# Patient Record
Sex: MALE | Race: BLACK OR AFRICAN AMERICAN | NOT HISPANIC OR LATINO | Employment: OTHER | URBAN - METROPOLITAN AREA
[De-identification: names, ages, dates, MRNs, and addresses within clinical notes are randomized per-mention and may not be internally consistent; named-entity substitution may affect disease eponyms.]

---

## 2023-01-25 PROBLEM — T45.1X5A CHEMOTHERAPY INDUCED NAUSEA AND VOMITING: Status: ACTIVE | Noted: 2023-01-25

## 2023-01-25 PROBLEM — F11.90 CHRONIC NARCOTIC USE: Status: RESOLVED | Noted: 2023-01-25 | Resolved: 2023-01-25

## 2023-01-25 PROBLEM — S96.911A RIGHT ANKLE STRAIN, INITIAL ENCOUNTER: Status: ACTIVE | Noted: 2023-01-25

## 2023-01-25 PROBLEM — M65.30 ACQUIRED TRIGGER FINGER: Status: ACTIVE | Noted: 2023-01-25

## 2023-01-25 PROBLEM — J30.1 ALLERGIC RHINITIS DUE TO POLLEN: Status: ACTIVE | Noted: 2023-01-25

## 2023-01-25 PROBLEM — K59.09 CHRONIC CONSTIPATION: Status: ACTIVE | Noted: 2023-01-25

## 2023-01-25 PROBLEM — M93.20 OSTEOCHONDRITIS DISSECANS: Status: ACTIVE | Noted: 2023-01-25

## 2023-01-25 PROBLEM — R35.0 URINARY FREQUENCY: Status: ACTIVE | Noted: 2023-01-25

## 2023-01-25 PROBLEM — M25.811 SHOULDER IMPINGEMENT, RIGHT: Status: ACTIVE | Noted: 2023-01-25

## 2023-01-25 PROBLEM — S93.409A SPRAIN OF ANKLE: Status: RESOLVED | Noted: 2023-01-25 | Resolved: 2023-01-25

## 2023-01-25 PROBLEM — S93.409A SPRAIN OF ANKLE: Status: ACTIVE | Noted: 2023-01-25

## 2023-01-25 PROBLEM — R94.31 ABNORMAL EKG: Status: ACTIVE | Noted: 2023-01-25

## 2023-01-25 PROBLEM — R10.9 ABDOMINAL PAIN: Status: ACTIVE | Noted: 2023-01-25

## 2023-01-25 PROBLEM — R53.83 FATIGUE: Status: ACTIVE | Noted: 2023-01-25

## 2023-01-25 PROBLEM — J45.40 MODERATE PERSISTENT ASTHMA (HHS-HCC): Status: ACTIVE | Noted: 2023-01-25

## 2023-01-25 PROBLEM — G47.33 OBSTRUCTIVE SLEEP APNEA: Status: ACTIVE | Noted: 2023-01-25

## 2023-01-25 PROBLEM — R44.8 FACIAL PRESSURE: Status: ACTIVE | Noted: 2023-01-25

## 2023-01-25 PROBLEM — M60.9 MYOSITIS: Status: ACTIVE | Noted: 2023-01-25

## 2023-01-25 PROBLEM — M25.571 ACUTE RIGHT ANKLE PAIN: Status: ACTIVE | Noted: 2023-01-25

## 2023-01-25 PROBLEM — M25.549 PAIN IN JOINT, HAND: Status: ACTIVE | Noted: 2023-01-25

## 2023-01-25 PROBLEM — R43.8 DECREASED SENSE OF SMELL: Status: ACTIVE | Noted: 2023-01-25

## 2023-01-25 PROBLEM — S93.401A SPRAIN OF ANKLE, RIGHT: Status: ACTIVE | Noted: 2023-01-25

## 2023-01-25 PROBLEM — J45.909 REACTIVE AIRWAY DISEASE (HHS-HCC): Status: ACTIVE | Noted: 2023-01-25

## 2023-01-25 PROBLEM — R10.2 PELVIC PAIN IN MALE: Status: ACTIVE | Noted: 2023-01-25

## 2023-01-25 PROBLEM — M25.511 RIGHT SHOULDER PAIN: Status: ACTIVE | Noted: 2023-01-25

## 2023-01-25 PROBLEM — I49.3 PVC'S (PREMATURE VENTRICULAR CONTRACTIONS): Status: ACTIVE | Noted: 2023-01-25

## 2023-01-25 PROBLEM — E55.9 VITAMIN D INSUFFICIENCY: Status: ACTIVE | Noted: 2023-01-25

## 2023-01-25 PROBLEM — R14.0 ABDOMINAL BLOATING: Status: ACTIVE | Noted: 2023-01-25

## 2023-01-25 PROBLEM — M35.01 SJOGREN'S SYNDROME WITH KERATOCONJUNCTIVITIS SICCA (MULTI): Status: ACTIVE | Noted: 2023-01-25

## 2023-01-25 PROBLEM — R11.2 CHEMOTHERAPY INDUCED NAUSEA AND VOMITING: Status: ACTIVE | Noted: 2023-01-25

## 2023-01-25 PROBLEM — M93.269 OSTEOCHONDRITIS DISSECANS OF KNEE: Status: ACTIVE | Noted: 2023-01-25

## 2023-01-25 PROBLEM — B37.0 ORAL THRUSH: Status: ACTIVE | Noted: 2023-01-25

## 2023-01-25 PROBLEM — E78.9 BORDERLINE HIGH CHOLESTEROL: Status: ACTIVE | Noted: 2023-01-25

## 2023-01-25 PROBLEM — R20.9 DISTURBANCE OF SKIN SENSATION: Status: ACTIVE | Noted: 2023-01-25

## 2023-01-25 PROBLEM — G62.9 NEUROPATHY, PERIPHERAL: Status: ACTIVE | Noted: 2023-01-25

## 2023-01-25 PROBLEM — M75.50 BURSITIS, SUBACROMIAL: Status: ACTIVE | Noted: 2023-01-25

## 2023-01-25 PROBLEM — J34.3 HYPERTROPHY OF BOTH INFERIOR NASAL TURBINATES: Status: ACTIVE | Noted: 2023-01-25

## 2023-01-25 PROBLEM — N41.9 PROSTATITIS: Status: ACTIVE | Noted: 2023-01-25

## 2023-01-25 PROBLEM — G62.89 SMALL FIBER POLYNEUROPATHY: Status: ACTIVE | Noted: 2023-01-25

## 2023-01-25 PROBLEM — R51.9 HEADACHE: Status: ACTIVE | Noted: 2023-01-25

## 2023-01-25 PROBLEM — G47.30 BREATHING-RELATED SLEEP DISORDER: Status: ACTIVE | Noted: 2023-01-25

## 2023-01-25 PROBLEM — M25.371 INSTABILITY OF RIGHT ANKLE JOINT: Status: ACTIVE | Noted: 2023-01-25

## 2023-01-25 PROBLEM — R09.81 NASAL CONGESTION: Status: ACTIVE | Noted: 2023-01-25

## 2023-01-25 PROBLEM — M25.511 PAIN IN JOINT OF RIGHT SHOULDER: Status: ACTIVE | Noted: 2023-01-25

## 2023-01-25 PROBLEM — S46.911A RIGHT SHOULDER STRAIN, INITIAL ENCOUNTER: Status: ACTIVE | Noted: 2023-01-25

## 2023-01-25 PROBLEM — M79.10 MYALGIA: Status: ACTIVE | Noted: 2023-01-25

## 2023-01-25 PROBLEM — H10.45 CHRONIC ALLERGIC CONJUNCTIVITIS: Status: ACTIVE | Noted: 2023-01-25

## 2023-01-25 PROBLEM — J02.9 PHARYNGITIS: Status: ACTIVE | Noted: 2023-01-25

## 2023-01-25 PROBLEM — G47.30 SLEEP APNEA: Status: ACTIVE | Noted: 2023-01-25

## 2023-01-25 PROBLEM — M53.3 DISORDER OF SACRUM: Status: ACTIVE | Noted: 2023-01-25

## 2023-01-25 PROBLEM — G47.8 POOR SLEEP PATTERN: Status: ACTIVE | Noted: 2023-01-25

## 2023-01-25 PROBLEM — R52 GENERALIZED PAIN: Status: ACTIVE | Noted: 2023-01-25

## 2023-01-25 PROBLEM — N52.9 MALE ERECTILE DISORDER: Status: ACTIVE | Noted: 2023-01-25

## 2023-01-25 PROBLEM — I27.20 PULMONARY HYPERTENSION (MULTI): Status: ACTIVE | Noted: 2023-01-25

## 2023-01-25 PROBLEM — R09.82 POSTNASAL DRIP: Status: ACTIVE | Noted: 2023-01-25

## 2023-01-25 PROBLEM — H15.109 EPISCLERITIS: Status: ACTIVE | Noted: 2023-01-25

## 2023-01-25 PROBLEM — M75.41 IMPINGEMENT SYNDROME OF RIGHT SHOULDER: Status: ACTIVE | Noted: 2023-01-25

## 2023-01-25 PROBLEM — G25.81 RESTLESS LEG SYNDROME: Status: ACTIVE | Noted: 2023-01-25

## 2023-01-25 PROBLEM — T50.Z95A ADVERSE EFFECT OF OTHER VACCINES AND BIOLOGICAL SUBSTANCES, INITIAL ENCOUNTER: Status: ACTIVE | Noted: 2023-01-25

## 2023-01-25 PROBLEM — M93.20 OSTEOCHONDRITIS DISSECANS: Status: RESOLVED | Noted: 2023-01-25 | Resolved: 2023-01-25

## 2023-01-25 PROBLEM — M35.00 SJOGRENS SYNDROME (MULTI): Status: ACTIVE | Noted: 2023-01-25

## 2023-01-25 PROBLEM — F11.90 CHRONIC NARCOTIC USE: Status: ACTIVE | Noted: 2023-01-25

## 2023-01-25 PROBLEM — M94.20: Status: ACTIVE | Noted: 2023-01-25

## 2023-01-25 PROBLEM — G56.01 CARPAL TUNNEL SYNDROME OF RIGHT WRIST: Status: ACTIVE | Noted: 2023-01-25

## 2023-01-25 PROBLEM — S93.401D SPRAIN OF UNSPECIFIED LIGAMENT OF RIGHT ANKLE, SUBSEQUENT ENCOUNTER: Status: ACTIVE | Noted: 2023-01-25

## 2023-01-25 RX ORDER — TIZANIDINE 4 MG/1
1 TABLET ORAL 3 TIMES DAILY PRN
COMMUNITY
End: 2024-02-29 | Stop reason: ALTCHOICE

## 2023-01-25 RX ORDER — MONTELUKAST SODIUM 10 MG/1
1 TABLET ORAL
COMMUNITY
End: 2023-09-06 | Stop reason: SDUPTHER

## 2023-01-25 RX ORDER — ALBUTEROL SULFATE 0.83 MG/ML
2.5 SOLUTION RESPIRATORY (INHALATION) EVERY 6 HOURS PRN
COMMUNITY

## 2023-01-25 RX ORDER — CETIRIZINE HYDROCHLORIDE 10 MG/1
TABLET ORAL
COMMUNITY

## 2023-01-25 RX ORDER — GABAPENTIN 600 MG/1
3 TABLET ORAL 3 TIMES DAILY
COMMUNITY
End: 2023-10-27 | Stop reason: SDUPTHER

## 2023-01-25 RX ORDER — METHYLPREDNISOLONE 4 MG/1
TABLET ORAL
COMMUNITY
Start: 2020-07-09

## 2023-01-25 RX ORDER — MULTIVITAMIN
1 TABLET ORAL DAILY
COMMUNITY

## 2023-01-25 RX ORDER — TRETINOIN 0.25 MG/G
CREAM TOPICAL
COMMUNITY
End: 2024-05-29 | Stop reason: ALTCHOICE

## 2023-01-25 RX ORDER — PRUCALOPRIDE 2 MG/1
1 TABLET, FILM COATED ORAL DAILY
COMMUNITY

## 2023-01-25 RX ORDER — GABAPENTIN 100 MG/1
1 CAPSULE ORAL 3 TIMES DAILY
COMMUNITY

## 2023-01-25 RX ORDER — MELOXICAM 15 MG/1
1 TABLET ORAL DAILY PRN
COMMUNITY
End: 2023-10-23

## 2023-01-25 RX ORDER — OMEPRAZOLE 40 MG/1
1 CAPSULE, DELAYED RELEASE ORAL DAILY
COMMUNITY
End: 2023-03-09 | Stop reason: SDUPTHER

## 2023-01-25 RX ORDER — OXYCODONE HYDROCHLORIDE 10 MG/1
1 TABLET ORAL
COMMUNITY
Start: 2012-10-25 | End: 2023-10-23 | Stop reason: SDUPTHER

## 2023-01-25 RX ORDER — FLUTICASONE PROPIONATE 50 MCG
SPRAY, SUSPENSION (ML) NASAL
COMMUNITY

## 2023-01-25 RX ORDER — ALPRAZOLAM 1 MG/1
1 TABLET ORAL 3 TIMES DAILY PRN
COMMUNITY
End: 2024-02-29 | Stop reason: DRUGHIGH

## 2023-01-25 RX ORDER — AZELASTINE 1 MG/ML
SPRAY, METERED NASAL
COMMUNITY

## 2023-01-25 RX ORDER — NALOXONE HYDROCHLORIDE 4 MG/.1ML
4 SPRAY NASAL
COMMUNITY

## 2023-01-25 RX ORDER — FLAXSEED OIL 1000 MG
CAPSULE ORAL
COMMUNITY
End: 2024-05-29 | Stop reason: ALTCHOICE

## 2023-01-25 RX ORDER — ALBUTEROL SULFATE 90 UG/1
2 AEROSOL, METERED RESPIRATORY (INHALATION) EVERY 4 HOURS PRN
COMMUNITY
End: 2024-02-29 | Stop reason: SDUPTHER

## 2023-01-25 RX ORDER — EPINEPHRINE 0.3 MG/.3ML
0.3 INJECTION SUBCUTANEOUS
COMMUNITY
Start: 2019-11-21

## 2023-01-25 RX ORDER — FLUTICASONE PROPIONATE 220 UG/1
2 AEROSOL, METERED RESPIRATORY (INHALATION) 2 TIMES DAILY
COMMUNITY
Start: 2020-02-17

## 2023-01-25 RX ORDER — MIRTAZAPINE 30 MG/1
2 TABLET, FILM COATED ORAL NIGHTLY
COMMUNITY

## 2023-02-11 PROBLEM — M35.00 SJOGREN'S SYNDROME (MULTI): Status: ACTIVE | Noted: 2023-02-11

## 2023-02-11 PROBLEM — F32.2 SEVERE MAJOR DEPRESSION WITHOUT PSYCHOTIC FEATURES (MULTI): Status: ACTIVE | Noted: 2023-02-11

## 2023-02-11 PROBLEM — R73.03 PREDIABETES: Status: ACTIVE | Noted: 2022-02-14

## 2023-02-11 RX ORDER — FLUTICASONE PROPIONATE 110 UG/1
2 AEROSOL, METERED RESPIRATORY (INHALATION) 2 TIMES DAILY
COMMUNITY
End: 2024-02-29 | Stop reason: DRUGHIGH

## 2023-02-11 RX ORDER — LUBIPROSTONE 24 UG/1
1 CAPSULE ORAL 2 TIMES DAILY
COMMUNITY
Start: 2022-10-25 | End: 2023-03-09 | Stop reason: SDUPTHER

## 2023-02-11 RX ORDER — POLYETHYLENE GLYCOL 3350 17 G/17G
17 POWDER, FOR SOLUTION ORAL DAILY
COMMUNITY
Start: 2022-10-25

## 2023-02-11 RX ORDER — ACETAMINOPHEN, DIPHENHYDRAMINE HCL, PHENYLEPHRINE HCL 325; 25; 5 MG/1; MG/1; MG/1
TABLET ORAL
COMMUNITY
End: 2024-05-29 | Stop reason: ALTCHOICE

## 2023-02-11 RX ORDER — PROPRANOLOL HYDROCHLORIDE 10 MG/1
1 TABLET ORAL 2 TIMES DAILY PRN
COMMUNITY

## 2023-02-11 RX ORDER — HYDROXYZINE HYDROCHLORIDE 25 MG/1
2 TABLET, FILM COATED ORAL NIGHTLY
COMMUNITY

## 2023-02-11 RX ORDER — TRAZODONE HYDROCHLORIDE 50 MG/1
1 TABLET ORAL NIGHTLY
COMMUNITY
End: 2024-05-29 | Stop reason: ALTCHOICE

## 2023-02-11 RX ORDER — LISINOPRIL 20 MG/1
1 TABLET ORAL DAILY
COMMUNITY
End: 2023-03-09 | Stop reason: SDUPTHER

## 2023-02-11 RX ORDER — CLINDAMYCIN PHOSPHATE 10 MG/G
GEL TOPICAL DAILY PRN
COMMUNITY
Start: 2021-05-25 | End: 2024-05-29 | Stop reason: ALTCHOICE

## 2023-02-11 RX ORDER — ALPRAZOLAM 2 MG/1
1 TABLET ORAL 4 TIMES DAILY PRN
COMMUNITY

## 2023-02-11 RX ORDER — TRIAMCINOLONE ACETONIDE 1 MG/ML
LOTION TOPICAL 2 TIMES DAILY
COMMUNITY
Start: 2021-08-19 | End: 2024-05-29 | Stop reason: ALTCHOICE

## 2023-03-08 LAB
ALANINE AMINOTRANSFERASE (SGPT) (U/L) IN SER/PLAS: 15 U/L (ref 10–52)
ALBUMIN (G/DL) IN SER/PLAS: 4.7 G/DL (ref 3.4–5)
ALKALINE PHOSPHATASE (U/L) IN SER/PLAS: 63 U/L (ref 33–120)
ANION GAP IN SER/PLAS: 8 MMOL/L (ref 10–20)
ASPARTATE AMINOTRANSFERASE (SGOT) (U/L) IN SER/PLAS: 19 U/L (ref 9–39)
BASOPHILS (10*3/UL) IN BLOOD BY AUTOMATED COUNT: 0.05 X10E9/L (ref 0–0.1)
BASOPHILS/100 LEUKOCYTES IN BLOOD BY AUTOMATED COUNT: 0.8 % (ref 0–2)
BILIRUBIN TOTAL (MG/DL) IN SER/PLAS: 0.6 MG/DL (ref 0–1.2)
C REACTIVE PROTEIN (MG/L) IN SER/PLAS: <0.1 MG/DL
CALCIUM (MG/DL) IN SER/PLAS: 9.6 MG/DL (ref 8.6–10.3)
CARBON DIOXIDE, TOTAL (MMOL/L) IN SER/PLAS: 32 MMOL/L (ref 21–32)
CHLORIDE (MMOL/L) IN SER/PLAS: 101 MMOL/L (ref 98–107)
CHOLESTEROL (MG/DL) IN SER/PLAS: 246 MG/DL (ref 0–199)
CHOLESTEROL IN HDL (MG/DL) IN SER/PLAS: 35.3 MG/DL
CHOLESTEROL/HDL RATIO: 7
CREATINE KINASE (U/L) IN SER/PLAS: 238 U/L (ref 0–325)
CREATININE (MG/DL) IN SER/PLAS: 1.27 MG/DL (ref 0.5–1.3)
EOSINOPHILS (10*3/UL) IN BLOOD BY AUTOMATED COUNT: 0.19 X10E9/L (ref 0–0.7)
EOSINOPHILS/100 LEUKOCYTES IN BLOOD BY AUTOMATED COUNT: 3.1 % (ref 0–6)
ERYTHROCYTE DISTRIBUTION WIDTH (RATIO) BY AUTOMATED COUNT: 12.1 % (ref 11.5–14.5)
ERYTHROCYTE MEAN CORPUSCULAR HEMOGLOBIN CONCENTRATION (G/DL) BY AUTOMATED: 34.8 G/DL (ref 32–36)
ERYTHROCYTE MEAN CORPUSCULAR VOLUME (FL) BY AUTOMATED COUNT: 88 FL (ref 80–100)
ERYTHROCYTES (10*6/UL) IN BLOOD BY AUTOMATED COUNT: 5.14 X10E12/L (ref 4.5–5.9)
ESTIMATED AVERAGE GLUCOSE FOR HBA1C: 114 MG/DL
GFR MALE: 73 ML/MIN/1.73M2
GLUCOSE (MG/DL) IN SER/PLAS: 89 MG/DL (ref 74–99)
HEMATOCRIT (%) IN BLOOD BY AUTOMATED COUNT: 45.4 % (ref 41–52)
HEMOGLOBIN (G/DL) IN BLOOD: 15.8 G/DL (ref 13.5–17.5)
HEMOGLOBIN A1C/HEMOGLOBIN TOTAL IN BLOOD: 5.6 %
IMMATURE GRANULOCYTES/100 LEUKOCYTES IN BLOOD BY AUTOMATED COUNT: 0 % (ref 0–0.9)
LDL: 171 MG/DL (ref 0–99)
LEUKOCYTES (10*3/UL) IN BLOOD BY AUTOMATED COUNT: 6 X10E9/L (ref 4.4–11.3)
LYMPHOCYTES (10*3/UL) IN BLOOD BY AUTOMATED COUNT: 2.51 X10E9/L (ref 1.2–4.8)
LYMPHOCYTES/100 LEUKOCYTES IN BLOOD BY AUTOMATED COUNT: 41.6 % (ref 13–44)
MONOCYTES (10*3/UL) IN BLOOD BY AUTOMATED COUNT: 0.4 X10E9/L (ref 0.1–1)
MONOCYTES/100 LEUKOCYTES IN BLOOD BY AUTOMATED COUNT: 6.6 % (ref 2–10)
NEUTROPHILS (10*3/UL) IN BLOOD BY AUTOMATED COUNT: 2.89 X10E9/L (ref 1.2–7.7)
NEUTROPHILS/100 LEUKOCYTES IN BLOOD BY AUTOMATED COUNT: 47.9 % (ref 40–80)
NON HDL CHOLESTEROL: 211 MG/DL
PLATELETS (10*3/UL) IN BLOOD AUTOMATED COUNT: 324 X10E9/L (ref 150–450)
POTASSIUM (MMOL/L) IN SER/PLAS: 5.3 MMOL/L (ref 3.5–5.3)
PROTEIN TOTAL: 7.9 G/DL (ref 6.4–8.2)
SEDIMENTATION RATE, ERYTHROCYTE: 2 MM/H (ref 0–15)
SODIUM (MMOL/L) IN SER/PLAS: 136 MMOL/L (ref 136–145)
TRIGLYCERIDE (MG/DL) IN SER/PLAS: 200 MG/DL (ref 0–149)
UREA NITROGEN (MG/DL) IN SER/PLAS: 9 MG/DL (ref 6–23)
VLDL: 40 MG/DL (ref 0–40)

## 2023-03-09 ENCOUNTER — OFFICE VISIT (OUTPATIENT)
Dept: PRIMARY CARE | Facility: CLINIC | Age: 43
End: 2023-03-09
Payer: MEDICARE

## 2023-03-09 VITALS
BODY MASS INDEX: 29.39 KG/M2 | HEART RATE: 85 BPM | WEIGHT: 217 LBS | HEIGHT: 72 IN | TEMPERATURE: 97.9 F | OXYGEN SATURATION: 100 % | SYSTOLIC BLOOD PRESSURE: 134 MMHG | DIASTOLIC BLOOD PRESSURE: 80 MMHG

## 2023-03-09 DIAGNOSIS — K59.09 CHRONIC CONSTIPATION: ICD-10-CM

## 2023-03-09 DIAGNOSIS — N18.2 STAGE 2 CHRONIC KIDNEY DISEASE: ICD-10-CM

## 2023-03-09 DIAGNOSIS — E78.5 HYPERLIPIDEMIA, UNSPECIFIED HYPERLIPIDEMIA TYPE: ICD-10-CM

## 2023-03-09 DIAGNOSIS — R73.03 PREDIABETES: ICD-10-CM

## 2023-03-09 DIAGNOSIS — I10 ESSENTIAL HYPERTENSION: Primary | ICD-10-CM

## 2023-03-09 DIAGNOSIS — M35.01 SJOGREN'S SYNDROME WITH KERATOCONJUNCTIVITIS SICCA (MULTI): ICD-10-CM

## 2023-03-09 DIAGNOSIS — J30.1 ALLERGIC RHINITIS DUE TO POLLEN, UNSPECIFIED SEASONALITY: ICD-10-CM

## 2023-03-09 DIAGNOSIS — K21.9 GASTROESOPHAGEAL REFLUX DISEASE WITHOUT ESOPHAGITIS: ICD-10-CM

## 2023-03-09 DIAGNOSIS — J45.20 MILD INTERMITTENT INTRINSIC ASTHMA WITHOUT STATUS ASTHMATICUS WITHOUT COMPLICATION (HHS-HCC): ICD-10-CM

## 2023-03-09 PROCEDURE — 3079F DIAST BP 80-89 MM HG: CPT | Performed by: FAMILY MEDICINE

## 2023-03-09 PROCEDURE — 3075F SYST BP GE 130 - 139MM HG: CPT | Performed by: FAMILY MEDICINE

## 2023-03-09 PROCEDURE — 1036F TOBACCO NON-USER: CPT | Performed by: FAMILY MEDICINE

## 2023-03-09 PROCEDURE — 99214 OFFICE O/P EST MOD 30 MIN: CPT | Performed by: FAMILY MEDICINE

## 2023-03-09 RX ORDER — LUBIPROSTONE 24 UG/1
24 CAPSULE ORAL 2 TIMES DAILY
Qty: 180 CAPSULE | Refills: 1 | Status: SHIPPED | OUTPATIENT
Start: 2023-03-09 | End: 2024-03-21 | Stop reason: SDUPTHER

## 2023-03-09 RX ORDER — OMEPRAZOLE 40 MG/1
40 CAPSULE, DELAYED RELEASE ORAL
Qty: 90 CAPSULE | Refills: 1 | Status: SHIPPED | OUTPATIENT
Start: 2023-03-09 | End: 2023-09-06 | Stop reason: SDUPTHER

## 2023-03-09 RX ORDER — LISINOPRIL 20 MG/1
20 TABLET ORAL DAILY
Qty: 90 TABLET | Refills: 1 | Status: SHIPPED | OUTPATIENT
Start: 2023-03-09 | End: 2023-09-06 | Stop reason: SDUPTHER

## 2023-03-09 ASSESSMENT — ENCOUNTER SYMPTOMS
ABDOMINAL PAIN: 0
DIARRHEA: 0
NAUSEA: 0
SHORTNESS OF BREATH: 0
CONSTIPATION: 1
POLYDIPSIA: 0
POLYPHAGIA: 0
FATIGUE: 1
HEADACHES: 0

## 2023-03-09 NOTE — PATIENT INSTRUCTIONS
Continue current medications    Recommend a whole foods plant based diet.  Cut back on meat, dairy, salt and oils. Increase fiber in your diet.  Recommend regular exercise most days of the week.    Follow up with your specialists as scheduled

## 2023-03-09 NOTE — PROGRESS NOTES
Subjective   Patient ID: Luis Condon Jr. is a 42 y.o. male who presents for Hypertension and Hyperlipidemia (Review labs.).    Hypertension: controlled   Lipids: High.  HDL 35, LDL high 171(149), triglycerides high 200(229)  Prediabetes: Stable A1c 5.6  GERD: stable and controlled  AR: stable on meds  Asthma: stable on inhalers.   CKD: stable. Cr nml, GFR 72  Constipation: stable on amitiza but concerned about cost. Only med that works  Sjogrens: fair control. Cont to see rheu         Review of Systems   Constitutional:  Positive for fatigue (chronic).   Eyes:  Negative for visual disturbance.   Respiratory:  Negative for shortness of breath.    Cardiovascular:  Negative for chest pain.   Gastrointestinal:  Positive for constipation. Negative for abdominal pain, diarrhea and nausea.   Endocrine: Negative for polydipsia, polyphagia and polyuria.   Musculoskeletal:         Denies new arthralgias   Allergic/Immunologic: Positive for environmental allergies.   Neurological:  Negative for headaches.   Psychiatric/Behavioral:          Managed by psychiatry       Objective   /80   Pulse 85   Temp 36.6 °C (97.9 °F)   Ht 1.829 m (6')   Wt 98.4 kg (217 lb)   SpO2 100%   BMI 29.43 kg/m²     Physical Exam  Constitutional:       Appearance: Normal appearance.   HENT:      Head: Normocephalic.      Right Ear: Tympanic membrane normal.      Left Ear: Tympanic membrane normal.      Nose: Nose normal.      Mouth/Throat:      Pharynx: Oropharynx is clear.   Eyes:      Pupils: Pupils are equal, round, and reactive to light.   Cardiovascular:      Rate and Rhythm: Normal rate and regular rhythm.      Pulses: Normal pulses.      Heart sounds: No murmur heard.  Pulmonary:      Effort: Pulmonary effort is normal. No respiratory distress.      Breath sounds: Normal breath sounds.   Abdominal:      General: Bowel sounds are normal.      Palpations: Abdomen is soft.      Tenderness: There is no abdominal tenderness. There is  no guarding.   Musculoskeletal:         General: No tenderness.   Skin:     General: Skin is warm.   Neurological:      General: No focal deficit present.      Mental Status: He is alert.      Cranial Nerves: No cranial nerve deficit.   Psychiatric:         Mood and Affect: Mood normal.         Assessment/Plan   Problem List Items Addressed This Visit          Respiratory    Intrinsic asthma without status asthmaticus    Relevant Orders    Follow Up In Primary Care       Circulatory    Essential hypertension - Primary    Relevant Medications    lisinopril 20 mg tablet    Other Relevant Orders    Comprehensive Metabolic Panel    Follow Up In Primary Care       Digestive    Chronic constipation    Relevant Medications    lubiprostone (Amitiza) 24 mcg capsule    Other Relevant Orders    Follow Up In Primary Care       Endocrine/Metabolic    Prediabetes    Relevant Medications    lisinopril 20 mg tablet    Other Relevant Orders    Comprehensive Metabolic Panel    Follow Up In Primary Care       Other    Allergic rhinitis due to pollen    Relevant Orders    Follow Up In Primary Care    Sjogren's syndrome with keratoconjunctivitis sicca (CMS/HCC)    Hyperlipidemia    Relevant Orders    Comprehensive Metabolic Panel    Lipid panel     Other Visit Diagnoses       Stage 2 chronic kidney disease        Gastroesophageal reflux disease without esophagitis        Relevant Medications    omeprazole (PriLOSEC) 40 mg DR capsule

## 2023-03-10 LAB
ALBUMIN ELP: 4.7 G/DL (ref 3.4–5)
ALPHA 1: 0.3 G/DL (ref 0.2–0.6)
ALPHA 2: 0.6 G/DL (ref 0.4–1.1)
BETA: 0.8 G/DL (ref 0.5–1.2)
GAMMA GLOBULIN: 1.5 G/DL (ref 0.5–1.4)
PATH REVIEW-SERUM PROTEIN ELECTROPHORESIS: NORMAL
PROTEIN ELECTROPHORESIS INTERPRETATION: ABNORMAL
PROTEIN TOTAL: 7.9 G/DL (ref 6.4–8.2)

## 2023-06-23 LAB
AMPHETAMINE (PRESENCE) IN URINE BY SCREEN METHOD: ABNORMAL
BARBITURATES PRESENCE IN URINE BY SCREEN METHOD: ABNORMAL
BENZODIAZEPINE (PRESENCE) IN URINE BY SCREEN METHOD: ABNORMAL
CANNABINOIDS IN URINE BY SCREEN METHOD: ABNORMAL
COCAINE (PRESENCE) IN URINE BY SCREEN METHOD: ABNORMAL
DRUG SCREEN COMMENT URINE: ABNORMAL
FENTANYL URINE: ABNORMAL
METHADONE (PRESENCE) IN URINE BY SCREEN METHOD: ABNORMAL
MUCUS, URINE: NORMAL /LPF
OPIATES (PRESENCE) IN URINE BY SCREEN METHOD: ABNORMAL
OXYCODONE (PRESENCE) IN URINE BY SCREEN METHOD: ABNORMAL
PHENCYCLIDINE (PRESENCE) IN URINE BY SCREEN METHOD: ABNORMAL
RBC, URINE: 1 /HPF (ref 0–5)
RENAL EPITHELIAL CELLS, URINE: <1 /HPF
WBC, URINE: 2 /HPF (ref 0–5)

## 2023-06-24 LAB
CHLAMYDIA TRACH., AMPLIFIED: NEGATIVE
N. GONORRHEA, AMPLIFIED: NEGATIVE
URINE CULTURE: NORMAL

## 2023-06-28 LAB
6-ACETYLMORPHINE: <25 NG/ML
CODEINE: <50 NG/ML
HYDROCODONE: <25 NG/ML
HYDROMORPHONE: <25 NG/ML
MORPHINE URINE: <50 NG/ML
NORHYDROCODONE: <25 NG/ML
NOROXYCODONE: >1000 NG/ML
OXYCODONE: 1533 NG/ML
OXYMORPHONE: 763 NG/ML

## 2023-09-01 ENCOUNTER — LAB (OUTPATIENT)
Dept: LAB | Facility: LAB | Age: 43
End: 2023-09-01
Payer: MEDICARE

## 2023-09-01 DIAGNOSIS — I10 ESSENTIAL HYPERTENSION: ICD-10-CM

## 2023-09-01 DIAGNOSIS — E78.5 HYPERLIPIDEMIA, UNSPECIFIED HYPERLIPIDEMIA TYPE: ICD-10-CM

## 2023-09-01 DIAGNOSIS — R73.03 PREDIABETES: ICD-10-CM

## 2023-09-01 LAB
ALANINE AMINOTRANSFERASE (SGPT) (U/L) IN SER/PLAS: 29 U/L (ref 10–52)
ALBUMIN (G/DL) IN SER/PLAS: 4.3 G/DL (ref 3.4–5)
ALKALINE PHOSPHATASE (U/L) IN SER/PLAS: 64 U/L (ref 33–120)
ANION GAP IN SER/PLAS: 10 MMOL/L (ref 10–20)
ASPARTATE AMINOTRANSFERASE (SGOT) (U/L) IN SER/PLAS: 23 U/L (ref 9–39)
BILIRUBIN TOTAL (MG/DL) IN SER/PLAS: 0.6 MG/DL (ref 0–1.2)
C REACTIVE PROTEIN (MG/L) IN SER/PLAS: 0.16 MG/DL
CALCIUM (MG/DL) IN SER/PLAS: 9.2 MG/DL (ref 8.6–10.3)
CARBON DIOXIDE, TOTAL (MMOL/L) IN SER/PLAS: 29 MMOL/L (ref 21–32)
CHLORIDE (MMOL/L) IN SER/PLAS: 101 MMOL/L (ref 98–107)
CHOLESTEROL (MG/DL) IN SER/PLAS: 200 MG/DL (ref 0–199)
CHOLESTEROL IN HDL (MG/DL) IN SER/PLAS: 29.5 MG/DL
CHOLESTEROL/HDL RATIO: 6.8
CREATINE KINASE (U/L) IN SER/PLAS: 277 U/L (ref 0–325)
CREATININE (MG/DL) IN SER/PLAS: 1.2 MG/DL (ref 0.5–1.3)
ERYTHROCYTE DISTRIBUTION WIDTH (RATIO) BY AUTOMATED COUNT: 12.4 % (ref 11.5–14.5)
ERYTHROCYTE MEAN CORPUSCULAR HEMOGLOBIN CONCENTRATION (G/DL) BY AUTOMATED: 34.1 G/DL (ref 32–36)
ERYTHROCYTE MEAN CORPUSCULAR VOLUME (FL) BY AUTOMATED COUNT: 90 FL (ref 80–100)
ERYTHROCYTES (10*6/UL) IN BLOOD BY AUTOMATED COUNT: 5.12 X10E12/L (ref 4.5–5.9)
GFR MALE: 78 ML/MIN/1.73M2
GLUCOSE (MG/DL) IN SER/PLAS: 102 MG/DL (ref 74–99)
HEMATOCRIT (%) IN BLOOD BY AUTOMATED COUNT: 46 % (ref 41–52)
HEMOGLOBIN (G/DL) IN BLOOD: 15.7 G/DL (ref 13.5–17.5)
LDL: 113 MG/DL (ref 0–99)
LEUKOCYTES (10*3/UL) IN BLOOD BY AUTOMATED COUNT: 5.6 X10E9/L (ref 4.4–11.3)
NON HDL CHOLESTEROL: 171 MG/DL
PLATELETS (10*3/UL) IN BLOOD AUTOMATED COUNT: 291 X10E9/L (ref 150–450)
POTASSIUM (MMOL/L) IN SER/PLAS: 4.4 MMOL/L (ref 3.5–5.3)
PROTEIN TOTAL: 7.7 G/DL (ref 6.4–8.2)
SEDIMENTATION RATE, ERYTHROCYTE: 10 MM/H (ref 0–15)
SODIUM (MMOL/L) IN SER/PLAS: 136 MMOL/L (ref 136–145)
TRIGLYCERIDE (MG/DL) IN SER/PLAS: 289 MG/DL (ref 0–149)
UREA NITROGEN (MG/DL) IN SER/PLAS: 13 MG/DL (ref 6–23)
VLDL: 58 MG/DL (ref 0–40)

## 2023-09-01 PROCEDURE — 36415 COLL VENOUS BLD VENIPUNCTURE: CPT

## 2023-09-01 PROCEDURE — 80061 LIPID PANEL: CPT

## 2023-09-01 PROCEDURE — 80053 COMPREHEN METABOLIC PANEL: CPT

## 2023-09-06 ENCOUNTER — OFFICE VISIT (OUTPATIENT)
Dept: PRIMARY CARE | Facility: CLINIC | Age: 43
End: 2023-09-06
Payer: MEDICARE

## 2023-09-06 VITALS
OXYGEN SATURATION: 98 % | HEIGHT: 72 IN | SYSTOLIC BLOOD PRESSURE: 118 MMHG | WEIGHT: 222 LBS | BODY MASS INDEX: 30.07 KG/M2 | DIASTOLIC BLOOD PRESSURE: 76 MMHG | HEART RATE: 65 BPM | TEMPERATURE: 97.6 F

## 2023-09-06 DIAGNOSIS — M35.01 SJOGREN'S SYNDROME WITH KERATOCONJUNCTIVITIS SICCA (MULTI): ICD-10-CM

## 2023-09-06 DIAGNOSIS — I10 BENIGN ESSENTIAL HYPERTENSION: ICD-10-CM

## 2023-09-06 DIAGNOSIS — R73.03 PREDIABETES: ICD-10-CM

## 2023-09-06 DIAGNOSIS — K21.9 GASTROESOPHAGEAL REFLUX DISEASE WITHOUT ESOPHAGITIS: ICD-10-CM

## 2023-09-06 DIAGNOSIS — K59.09 CHRONIC CONSTIPATION: ICD-10-CM

## 2023-09-06 DIAGNOSIS — E66.9 OBESITY WITH SERIOUS COMORBIDITY, UNSPECIFIED CLASSIFICATION, UNSPECIFIED OBESITY TYPE: ICD-10-CM

## 2023-09-06 DIAGNOSIS — K59.9 FUNCTIONAL INTESTINAL DISORDER: ICD-10-CM

## 2023-09-06 DIAGNOSIS — Z00.00 ROUTINE GENERAL MEDICAL EXAMINATION AT HEALTH CARE FACILITY: Primary | ICD-10-CM

## 2023-09-06 DIAGNOSIS — F32.2 SEVERE MAJOR DEPRESSION WITHOUT PSYCHOTIC FEATURES (MULTI): ICD-10-CM

## 2023-09-06 DIAGNOSIS — I10 ESSENTIAL HYPERTENSION: ICD-10-CM

## 2023-09-06 DIAGNOSIS — E78.5 HYPERLIPIDEMIA, UNSPECIFIED HYPERLIPIDEMIA TYPE: ICD-10-CM

## 2023-09-06 DIAGNOSIS — I27.20 PULMONARY HYPERTENSION (MULTI): ICD-10-CM

## 2023-09-06 DIAGNOSIS — J30.1 ALLERGIC RHINITIS DUE TO POLLEN, UNSPECIFIED SEASONALITY: ICD-10-CM

## 2023-09-06 DIAGNOSIS — J45.20 MILD INTERMITTENT INTRINSIC ASTHMA WITHOUT STATUS ASTHMATICUS WITHOUT COMPLICATION (HHS-HCC): ICD-10-CM

## 2023-09-06 PROBLEM — R45.89 DEPRESSED MOOD: Status: ACTIVE | Noted: 2020-03-16

## 2023-09-06 PROBLEM — G89.4 CHRONIC PAIN SYNDROME: Status: ACTIVE | Noted: 2017-02-22

## 2023-09-06 PROCEDURE — 90686 IIV4 VACC NO PRSV 0.5 ML IM: CPT | Performed by: FAMILY MEDICINE

## 2023-09-06 PROCEDURE — 1036F TOBACCO NON-USER: CPT | Performed by: FAMILY MEDICINE

## 2023-09-06 PROCEDURE — 99396 PREV VISIT EST AGE 40-64: CPT | Performed by: FAMILY MEDICINE

## 2023-09-06 PROCEDURE — G0439 PPPS, SUBSEQ VISIT: HCPCS | Performed by: FAMILY MEDICINE

## 2023-09-06 PROCEDURE — 3008F BODY MASS INDEX DOCD: CPT | Performed by: FAMILY MEDICINE

## 2023-09-06 PROCEDURE — 3074F SYST BP LT 130 MM HG: CPT | Performed by: FAMILY MEDICINE

## 2023-09-06 PROCEDURE — G0008 ADMIN INFLUENZA VIRUS VAC: HCPCS | Performed by: FAMILY MEDICINE

## 2023-09-06 PROCEDURE — 99214 OFFICE O/P EST MOD 30 MIN: CPT | Performed by: FAMILY MEDICINE

## 2023-09-06 PROCEDURE — G0446 INTENS BEHAVE THER CARDIO DX: HCPCS | Performed by: FAMILY MEDICINE

## 2023-09-06 PROCEDURE — 3078F DIAST BP <80 MM HG: CPT | Performed by: FAMILY MEDICINE

## 2023-09-06 RX ORDER — BACLOFEN 10 MG/1
10 TABLET ORAL EVERY 12 HOURS
COMMUNITY
Start: 2023-08-30 | End: 2023-12-21

## 2023-09-06 RX ORDER — MONTELUKAST SODIUM 10 MG/1
10 TABLET ORAL
Qty: 90 TABLET | Refills: 1 | Status: SHIPPED | OUTPATIENT
Start: 2023-09-06 | End: 2024-05-29 | Stop reason: WASHOUT

## 2023-09-06 RX ORDER — BUPROPION HYDROCHLORIDE 75 MG/1
2 TABLET ORAL 2 TIMES DAILY
COMMUNITY
Start: 2023-06-26

## 2023-09-06 RX ORDER — OMEPRAZOLE 40 MG/1
40 CAPSULE, DELAYED RELEASE ORAL
Qty: 90 CAPSULE | Refills: 1 | Status: SHIPPED | OUTPATIENT
Start: 2023-09-06 | End: 2024-03-21 | Stop reason: SDUPTHER

## 2023-09-06 RX ORDER — LISINOPRIL 20 MG/1
20 TABLET ORAL DAILY
Qty: 90 TABLET | Refills: 1 | Status: SHIPPED | OUTPATIENT
Start: 2023-09-06 | End: 2024-03-04

## 2023-09-06 ASSESSMENT — PATIENT HEALTH QUESTIONNAIRE - PHQ9
SUM OF ALL RESPONSES TO PHQ9 QUESTIONS 1 AND 2: 2
1. LITTLE INTEREST OR PLEASURE IN DOING THINGS: NOT AT ALL
2. FEELING DOWN, DEPRESSED OR HOPELESS: MORE THAN HALF THE DAYS
10. IF YOU CHECKED OFF ANY PROBLEMS, HOW DIFFICULT HAVE THESE PROBLEMS MADE IT FOR YOU TO DO YOUR WORK, TAKE CARE OF THINGS AT HOME, OR GET ALONG WITH OTHER PEOPLE: EXTREMELY DIFFICULT

## 2023-09-06 ASSESSMENT — ACTIVITIES OF DAILY LIVING (ADL)
GROCERY_SHOPPING: NEEDS ASSISTANCE
DOING_HOUSEWORK: NEEDS ASSISTANCE
MANAGING_FINANCES: INDEPENDENT
BATHING: INDEPENDENT
DRESSING: INDEPENDENT
TAKING_MEDICATION: INDEPENDENT

## 2023-09-06 NOTE — PATIENT INSTRUCTIONS
Recommend a predominant whole foods plant based diet.  Cut back on meat, dairy, salt and oils. Increase fiber in your diet.  Decrease alcohol as much as possible if you drink. Recommend regular exercise most days of the week.    You were referred to stomach specialists. Avoid food triggers    Continue your current meds    Follow up with your specialists as scheduled    Follow up in 3 months, sooner if needed

## 2023-09-06 NOTE — PROGRESS NOTES
Subjective   Reason for Visit: Luis Condon Jr. is an 43 y.o. male here for a Medicare Wellness visit, CPE and multiple issues.     Past Medical, Surgical, and Family History reviewed and updated in chart.    Reviewed all medications by prescribing practitioner or clinical pharmacist (such as prescriptions, OTCs, herbal therapies and supplements) and documented in the medical record.    HPI    Patient Care Team:  Sherrie Rosas DO as PCP - General  Sherrie Rosas DO as PCP - [employer] PCP  Elio Ruiz MD as Surgeon (Urology)   Dr. Ely: rheumatology  Dr. Garcia: allergist.   Dr. Jose: psychology  Dr. Yan: neurology    Lipids: high. HDL 29, ,   HTN: controlled.   GERD: stable on PPI. Still w/ occ breakthrough.   GI: has chronic bloating and pain. No bldy stool. +constipation. Would like to see GI but through [facility].   Mood: stable. Followed by psych  AR: stable.   Pulm HTN/MARY JO: stable. Compliant with CPAP  Asthma: stable on meds.   BMI: high. Having trouble losing weight.   Predm: stable.     Review of Systems   Constitutional:  Negative for fatigue.   HENT:  Positive for congestion (chronic).    Eyes:  Negative for visual disturbance.   Respiratory:  Negative for shortness of breath.    Cardiovascular:  Negative for chest pain and palpitations.   Gastrointestinal:  Positive for abdominal pain and constipation. Negative for abdominal distention, blood in stool, diarrhea, nausea and vomiting.   Endocrine: Negative for cold intolerance, heat intolerance, polydipsia, polyphagia and polyuria.   Genitourinary:  Negative for difficulty urinating and dysuria.   Musculoskeletal:  Positive for arthralgias and myalgias.   Skin:  Negative for rash.   Allergic/Immunologic: Positive for environmental allergies.   Neurological:  Negative for dizziness and headaches.   Psychiatric/Behavioral:  Negative for dysphoric mood and sleep disturbance.        Objective    Vitals:  /76   Pulse 65   Temp 36.4 °C (97.6 °F)   Ht 1.829 m (6')   Wt 101 kg (222 lb)   SpO2 98%   BMI 30.11 kg/m²       Physical Exam  Vitals and nursing note reviewed.   Constitutional:       General: He is not in acute distress.     Appearance: Normal appearance. He is not toxic-appearing.   HENT:      Head: Normocephalic.      Right Ear: Tympanic membrane normal.      Left Ear: Tympanic membrane normal.      Nose: Nose normal.      Mouth/Throat:      Pharynx: Oropharynx is clear.   Eyes:      General: No scleral icterus.     Pupils: Pupils are equal, round, and reactive to light.   Neck:      Vascular: No carotid bruit.   Cardiovascular:      Rate and Rhythm: Normal rate and regular rhythm.      Pulses: Normal pulses.      Heart sounds: No murmur heard.  Pulmonary:      Effort: Pulmonary effort is normal. No respiratory distress.      Breath sounds: Normal breath sounds.   Abdominal:      General: Bowel sounds are normal.      Palpations: Abdomen is soft.      Tenderness: There is no abdominal tenderness. There is no guarding.   Musculoskeletal:         General: No tenderness.      Cervical back: No rigidity.      Right lower leg: No edema.      Left lower leg: No edema.   Lymphadenopathy:      Cervical: No cervical adenopathy.   Skin:     General: Skin is warm.   Neurological:      General: No focal deficit present.      Mental Status: He is alert.      Cranial Nerves: No cranial nerve deficit.   Psychiatric:         Mood and Affect: Mood normal.         Assessment/Plan   Problem List Items Addressed This Visit       Allergic rhinitis due to pollen    Overview     Unspecified seasonal         Chronic constipation    Intrinsic asthma without status asthmaticus    Overview     Note: bw         Essential hypertension    Prediabetes    Overview     Note: JW          Other Visit Diagnoses       Routine general medical examination at health care facility    -  Primary        Discussed blood work and  wellness issues. Reviewed screenings and immunizations. Recommendations given    ASCVD risk counseling:  discussed ASCVD risk and score 5.5%.  Aspirin not indicated at this time. Lifestyle modifications including nutritional choices, exercise and trying to eliminate habits contributing to risk were discussed. Patient agreeable to make efforts to continue to control their current cardiovascular risk factors.

## 2023-09-07 ASSESSMENT — ENCOUNTER SYMPTOMS
DIFFICULTY URINATING: 0
HEADACHES: 0
FATIGUE: 0
ARTHRALGIAS: 1
DIZZINESS: 0
BLOOD IN STOOL: 0
SHORTNESS OF BREATH: 0
DIARRHEA: 0
CONSTIPATION: 1
NAUSEA: 0
DYSPHORIC MOOD: 0
SLEEP DISTURBANCE: 0
PALPITATIONS: 0
POLYPHAGIA: 0
ABDOMINAL DISTENTION: 0
POLYDIPSIA: 0
DYSURIA: 0
VOMITING: 0
MYALGIAS: 1
ABDOMINAL PAIN: 1

## 2023-10-22 DIAGNOSIS — R52 PAIN, UNSPECIFIED: ICD-10-CM

## 2023-10-23 DIAGNOSIS — M35.06 SJOGREN'S SYNDROME WITH PERIPHERAL NERVOUS SYSTEM INVOLVEMENT (MULTI): Primary | ICD-10-CM

## 2023-10-23 RX ORDER — MELOXICAM 15 MG/1
15 TABLET ORAL DAILY PRN
Qty: 90 TABLET | Refills: 1 | Status: SHIPPED | OUTPATIENT
Start: 2023-10-23 | End: 2024-04-17

## 2023-10-24 RX ORDER — OXYCODONE HYDROCHLORIDE 10 MG/1
10 TABLET ORAL EVERY 6 HOURS PRN
Qty: 105 TABLET | Refills: 0 | Status: SHIPPED | OUTPATIENT
Start: 2023-10-24 | End: 2023-11-27 | Stop reason: SDUPTHER

## 2023-10-26 ENCOUNTER — CLINICAL SUPPORT (OUTPATIENT)
Dept: ALLERGY | Facility: CLINIC | Age: 43
End: 2023-10-26
Payer: MEDICARE

## 2023-10-26 DIAGNOSIS — J30.89 NON-SEASONAL ALLERGIC RHINITIS, UNSPECIFIED TRIGGER: ICD-10-CM

## 2023-10-26 DIAGNOSIS — J30.81 ALLERGIC RHINITIS DUE TO ANIMAL (CAT) (DOG) HAIR AND DANDER: ICD-10-CM

## 2023-10-26 DIAGNOSIS — K59.09 CHRONIC CONSTIPATION: ICD-10-CM

## 2023-10-26 DIAGNOSIS — J30.9 ALLERGIC RHINITIS, UNSPECIFIED SEASONALITY, UNSPECIFIED TRIGGER: ICD-10-CM

## 2023-10-26 PROCEDURE — 95117 IMMUNOTHERAPY INJECTIONS: CPT | Performed by: ALLERGY & IMMUNOLOGY

## 2023-10-26 NOTE — TELEPHONE ENCOUNTER
Pharmacy Request  Last sent on 3/9/23 with 1 refill and next OV not until 12/6/23. Pt would need short supply sent to last. Please advise, AM

## 2023-10-27 DIAGNOSIS — G62.89 SMALL FIBER POLYNEUROPATHY: ICD-10-CM

## 2023-10-27 RX ORDER — GABAPENTIN 600 MG/1
1800 TABLET ORAL 3 TIMES DAILY
Qty: 810 TABLET | Refills: 0 | Status: SHIPPED | OUTPATIENT
Start: 2023-10-27 | End: 2024-01-15

## 2023-10-31 RX ORDER — LUBIPROSTONE 24 UG/1
24 CAPSULE ORAL 2 TIMES DAILY
Qty: 180 CAPSULE | Refills: 1 | OUTPATIENT
Start: 2023-10-31 | End: 2024-04-28

## 2023-11-16 ENCOUNTER — CLINICAL SUPPORT (OUTPATIENT)
Dept: ALLERGY | Facility: CLINIC | Age: 43
End: 2023-11-16
Payer: MEDICARE

## 2023-11-16 DIAGNOSIS — J30.81 ALLERGIC RHINITIS DUE TO ANIMAL (CAT) (DOG) HAIR AND DANDER: ICD-10-CM

## 2023-11-16 DIAGNOSIS — J30.89 NON-SEASONAL ALLERGIC RHINITIS, UNSPECIFIED TRIGGER: ICD-10-CM

## 2023-11-16 DIAGNOSIS — J30.9 ALLERGIC RHINITIS, UNSPECIFIED SEASONALITY, UNSPECIFIED TRIGGER: ICD-10-CM

## 2023-11-16 PROCEDURE — 95117 IMMUNOTHERAPY INJECTIONS: CPT | Performed by: ALLERGY & IMMUNOLOGY

## 2023-11-27 DIAGNOSIS — M35.06 SJOGREN'S SYNDROME WITH PERIPHERAL NERVOUS SYSTEM INVOLVEMENT (MULTI): ICD-10-CM

## 2023-11-27 RX ORDER — OXYCODONE HYDROCHLORIDE 10 MG/1
10 TABLET ORAL EVERY 6 HOURS PRN
Qty: 105 TABLET | Refills: 0 | Status: SHIPPED | OUTPATIENT
Start: 2023-11-27 | End: 2023-11-28 | Stop reason: SDUPTHER

## 2023-11-28 DIAGNOSIS — M35.06 SJOGREN'S SYNDROME WITH PERIPHERAL NERVOUS SYSTEM INVOLVEMENT (MULTI): ICD-10-CM

## 2023-11-28 RX ORDER — OXYCODONE HYDROCHLORIDE 10 MG/1
10 TABLET ORAL EVERY 6 HOURS PRN
Qty: 105 TABLET | Refills: 0 | Status: SHIPPED | OUTPATIENT
Start: 2023-11-28 | End: 2023-12-21 | Stop reason: SDUPTHER

## 2023-11-28 NOTE — PROGRESS NOTES
I have a confirmation of receipt for our E scribed oxycodone by the  pharmacy on 11/27/2023 at 4:46 PM in our system.  I can send it again

## 2023-11-30 ENCOUNTER — OFFICE VISIT (OUTPATIENT)
Dept: RHEUMATOLOGY | Facility: CLINIC | Age: 43
End: 2023-11-30
Payer: MEDICARE

## 2023-11-30 ENCOUNTER — LAB (OUTPATIENT)
Dept: LAB | Facility: LAB | Age: 43
End: 2023-11-30
Payer: MEDICARE

## 2023-11-30 VITALS
WEIGHT: 221 LBS | SYSTOLIC BLOOD PRESSURE: 162 MMHG | HEIGHT: 72 IN | DIASTOLIC BLOOD PRESSURE: 115 MMHG | BODY MASS INDEX: 29.93 KG/M2

## 2023-11-30 DIAGNOSIS — M35.01 SJOGREN'S SYNDROME WITH KERATOCONJUNCTIVITIS SICCA (MULTI): Primary | ICD-10-CM

## 2023-11-30 DIAGNOSIS — R73.03 PREDIABETES: ICD-10-CM

## 2023-11-30 DIAGNOSIS — Z79.891 ENCOUNTER FOR LONG-TERM OPIATE ANALGESIC USE: ICD-10-CM

## 2023-11-30 DIAGNOSIS — I10 BENIGN ESSENTIAL HYPERTENSION: ICD-10-CM

## 2023-11-30 DIAGNOSIS — E78.5 HYPERLIPIDEMIA, UNSPECIFIED HYPERLIPIDEMIA TYPE: ICD-10-CM

## 2023-11-30 LAB
ALBUMIN SERPL BCP-MCNC: 4.5 G/DL (ref 3.4–5)
ALP SERPL-CCNC: 59 U/L (ref 33–120)
ALT SERPL W P-5'-P-CCNC: 35 U/L (ref 10–52)
ANION GAP SERPL CALC-SCNC: 12 MMOL/L (ref 10–20)
AST SERPL W P-5'-P-CCNC: 27 U/L (ref 9–39)
BILIRUB SERPL-MCNC: 0.6 MG/DL (ref 0–1.2)
BUN SERPL-MCNC: 5 MG/DL (ref 6–23)
CALCIUM SERPL-MCNC: 9.5 MG/DL (ref 8.6–10.3)
CHLORIDE SERPL-SCNC: 103 MMOL/L (ref 98–107)
CHOLEST SERPL-MCNC: 250 MG/DL (ref 0–199)
CHOLESTEROL/HDL RATIO: 6.8
CO2 SERPL-SCNC: 28 MMOL/L (ref 21–32)
CREAT SERPL-MCNC: 1.03 MG/DL (ref 0.5–1.3)
GFR SERPL CREATININE-BSD FRML MDRD: >90 ML/MIN/1.73M*2
GLUCOSE SERPL-MCNC: 100 MG/DL (ref 74–99)
HDLC SERPL-MCNC: 37 MG/DL
LDLC SERPL CALC-MCNC: 134 MG/DL
NON HDL CHOLESTEROL: 213 MG/DL (ref 0–149)
POTASSIUM SERPL-SCNC: 4.6 MMOL/L (ref 3.5–5.3)
PROT SERPL-MCNC: 7.9 G/DL (ref 6.4–8.2)
SODIUM SERPL-SCNC: 138 MMOL/L (ref 136–145)
TRIGL SERPL-MCNC: 396 MG/DL (ref 0–149)
TSH SERPL-ACNC: 1.14 MIU/L (ref 0.44–3.98)
VLDL: 79 MG/DL (ref 0–40)

## 2023-11-30 PROCEDURE — 80061 LIPID PANEL: CPT

## 2023-11-30 PROCEDURE — 99214 OFFICE O/P EST MOD 30 MIN: CPT | Performed by: INTERNAL MEDICINE

## 2023-11-30 PROCEDURE — 1036F TOBACCO NON-USER: CPT | Performed by: INTERNAL MEDICINE

## 2023-11-30 PROCEDURE — 3077F SYST BP >= 140 MM HG: CPT | Performed by: INTERNAL MEDICINE

## 2023-11-30 PROCEDURE — 36415 COLL VENOUS BLD VENIPUNCTURE: CPT

## 2023-11-30 PROCEDURE — 84443 ASSAY THYROID STIM HORMONE: CPT

## 2023-11-30 PROCEDURE — 3008F BODY MASS INDEX DOCD: CPT | Performed by: INTERNAL MEDICINE

## 2023-11-30 PROCEDURE — 3080F DIAST BP >= 90 MM HG: CPT | Performed by: INTERNAL MEDICINE

## 2023-11-30 PROCEDURE — 80053 COMPREHEN METABOLIC PANEL: CPT

## 2023-11-30 PROCEDURE — 83036 HEMOGLOBIN GLYCOSYLATED A1C: CPT

## 2023-11-30 ASSESSMENT — ENCOUNTER SYMPTOMS
JOINT SWELLING: 1
MYALGIAS: 1
NUMBNESS: 1
BACK PAIN: 1
ARTHRALGIAS: 1
ABDOMINAL PAIN: 1
ABDOMINAL DISTENTION: 1
FATIGUE: 1
SLEEP DISTURBANCE: 1
CONSTIPATION: 1

## 2023-11-30 NOTE — PROGRESS NOTES
Subjective   Patient ID: Luis Condon Jr. is a 43 y.o. male who presents for Follow-up (3 month follow up ).  HPI  Patient with Sjogren's with positive STEPHANY and SSB with sicca symptoms, elevated CPK with normal muscle biopsy, episcleritis, small fiber neuropathy on skin biopsy to review.  Previous medications have included azathioprine, CellCept, methotrexate, Cytoxan, IVIG, Rituxan, high-dose steroids.    At his last visit we had changed his tizanidine to baclofen and had him continue on his oxycodone.  He was going to see orthopedics concerning carpal tunnel symptoms and was going to follow-up with a podiatrist.    He did get an injection into his bilateral carpal tunnel on 9/19/2023 by Dr. Marx.  He has been seen by gastroenterology for his abdominal symptoms.  They gave him a trial of ibsrela for constipation and also scheduled anal rectal manometry.  His breath test was negative for SIBO.  Anal manometry which suggested poor pelvic floor coordination secondary to pelvic floor nonrelaxation dyssynergy.  He had moderate colonic stool burden on his abdominal x-ray.    He has started pelvic floor therapy.  The injections for the carpal tunnel did not really help and he is pondering if he should do the carpal tunnel release.  The ibsrela is hit or miss.  Sometimes he will have diarrhea all night.  He feels that symptom is that he gets very bloated as the day goes on.  His diet has remained the same.  He usually does veggies and has introduced some salmon.    The foot doctor wants to do surgery    He notes from the past doing surgery he heals very slowly.  He feels that the baclofen is about the same as the tizanidine.  Review of Systems   Constitutional:  Positive for fatigue.   HENT:          Dry mouth   Eyes:         Dry eye   Gastrointestinal:  Positive for abdominal distention, abdominal pain and constipation.   Musculoskeletal:  Positive for arthralgias, back pain, joint swelling and myalgias.    Neurological:  Positive for numbness.   Psychiatric/Behavioral:  Positive for sleep disturbance.        Objective   Physical Exam  GEN: NAD A&O x3 appropriate affect   EYES:  Wearing a brace on his right wrist   No current swelling in the hands elbows mild guarding with range of motion of the right shoulder has a brace on the right foot no swelling in the knees  No rashes seen on exposed skin   Assessment/Plan        Patient with +STEPHANY +SSB with sicca symptoms elevated cpks with normal muscle biopsy, episcleritis. Patient says he has been diagnosed with small fiber neuropathy seen on skin biopsy. Review of biopsy report does not fully support small fiber neuropathy but does comment that the epidermal nerve fiber densities are in the lower normal range with some axonal swelling at the distal leg. Has been evaluated by Dr. Yan (neurology) who feels that his symptoms are most likely due to small fiber neuropathy .      Sjogren's.- Has been on azathioprine, CellCept, methotrexate, Cytoxan, IVIG, Rituxan without any help in his symptoms. He had high dose steroids and had side effects to this. We will do blood work but uncertain what else we can do for his symptoms. We have retried methotrexate and azathioprine. Patient is wondering is there anything else that we can try and we had tried several options including Rituxan and IVIG. I do not feel that this is worth retrying again.      He has had some issues obtaining his oxycodone.  Currently on combination with gabapentin, alprazolam, baclofen,.  Patient understands risk of increases sedation.    In 3 months or as needed.

## 2023-12-01 LAB
EST. AVERAGE GLUCOSE BLD GHB EST-MCNC: 120 MG/DL
HBA1C MFR BLD: 5.8 %

## 2023-12-06 ENCOUNTER — APPOINTMENT (OUTPATIENT)
Dept: PRIMARY CARE | Facility: CLINIC | Age: 43
End: 2023-12-06
Payer: MEDICARE

## 2023-12-14 ENCOUNTER — CLINICAL SUPPORT (OUTPATIENT)
Dept: ALLERGY | Facility: CLINIC | Age: 43
End: 2023-12-14
Payer: MEDICARE

## 2023-12-14 DIAGNOSIS — J30.9 ALLERGIC RHINITIS, UNSPECIFIED SEASONALITY, UNSPECIFIED TRIGGER: ICD-10-CM

## 2023-12-14 DIAGNOSIS — J30.89 NON-SEASONAL ALLERGIC RHINITIS, UNSPECIFIED TRIGGER: ICD-10-CM

## 2023-12-14 DIAGNOSIS — J30.81 ALLERGIC RHINITIS DUE TO ANIMAL (CAT) (DOG) HAIR AND DANDER: ICD-10-CM

## 2023-12-14 PROCEDURE — 95117 IMMUNOTHERAPY INJECTIONS: CPT | Performed by: ALLERGY & IMMUNOLOGY

## 2023-12-21 ENCOUNTER — OFFICE VISIT (OUTPATIENT)
Dept: PRIMARY CARE | Facility: CLINIC | Age: 43
End: 2023-12-21
Payer: MEDICARE

## 2023-12-21 VITALS
TEMPERATURE: 97.4 F | SYSTOLIC BLOOD PRESSURE: 132 MMHG | DIASTOLIC BLOOD PRESSURE: 82 MMHG | OXYGEN SATURATION: 98 % | HEART RATE: 83 BPM | WEIGHT: 224 LBS | BODY MASS INDEX: 30.38 KG/M2

## 2023-12-21 DIAGNOSIS — M79.10 MYALGIA, UNSPECIFIED SITE: ICD-10-CM

## 2023-12-21 DIAGNOSIS — J45.20 MILD INTERMITTENT ASTHMA, UNSPECIFIED WHETHER COMPLICATED (HHS-HCC): ICD-10-CM

## 2023-12-21 DIAGNOSIS — E78.5 HYPERLIPIDEMIA, UNSPECIFIED HYPERLIPIDEMIA TYPE: ICD-10-CM

## 2023-12-21 DIAGNOSIS — G47.33 OBSTRUCTIVE SLEEP APNEA: Primary | ICD-10-CM

## 2023-12-21 DIAGNOSIS — M35.01 SJOGREN'S SYNDROME WITH KERATOCONJUNCTIVITIS SICCA (MULTI): ICD-10-CM

## 2023-12-21 DIAGNOSIS — R73.03 PREDIABETES: ICD-10-CM

## 2023-12-21 DIAGNOSIS — I10 ESSENTIAL HYPERTENSION: ICD-10-CM

## 2023-12-21 DIAGNOSIS — R07.9 CHEST PAIN, UNSPECIFIED TYPE: ICD-10-CM

## 2023-12-21 DIAGNOSIS — M35.06 SJOGREN'S SYNDROME WITH PERIPHERAL NERVOUS SYSTEM INVOLVEMENT (MULTI): ICD-10-CM

## 2023-12-21 PROCEDURE — 93000 ELECTROCARDIOGRAM COMPLETE: CPT | Performed by: FAMILY MEDICINE

## 2023-12-21 PROCEDURE — 3079F DIAST BP 80-89 MM HG: CPT | Performed by: FAMILY MEDICINE

## 2023-12-21 PROCEDURE — 3075F SYST BP GE 130 - 139MM HG: CPT | Performed by: FAMILY MEDICINE

## 2023-12-21 PROCEDURE — 3008F BODY MASS INDEX DOCD: CPT | Performed by: FAMILY MEDICINE

## 2023-12-21 PROCEDURE — 99215 OFFICE O/P EST HI 40 MIN: CPT | Performed by: FAMILY MEDICINE

## 2023-12-21 PROCEDURE — 1036F TOBACCO NON-USER: CPT | Performed by: FAMILY MEDICINE

## 2023-12-21 RX ORDER — HYDROXYZINE PAMOATE 25 MG/1
25 CAPSULE ORAL 3 TIMES DAILY PRN
COMMUNITY
Start: 2023-12-15 | End: 2024-02-29 | Stop reason: SDUPTHER

## 2023-12-21 RX ORDER — ALBUTEROL SULFATE 90 UG/1
1-2 AEROSOL, METERED RESPIRATORY (INHALATION) EVERY 4 HOURS PRN
Qty: 8 G | Refills: 1 | Status: SHIPPED | OUTPATIENT
Start: 2023-12-21 | End: 2024-03-21 | Stop reason: SDUPTHER

## 2023-12-21 RX ORDER — ICOSAPENT ETHYL 1 G/1
2 CAPSULE ORAL
Qty: 120 CAPSULE | Refills: 2 | Status: SHIPPED | OUTPATIENT
Start: 2023-12-21 | End: 2024-03-21 | Stop reason: SDUPTHER

## 2023-12-21 RX ORDER — BACLOFEN 10 MG/1
10 TABLET ORAL EVERY 12 HOURS
Qty: 180 TABLET | Refills: 1 | Status: SHIPPED | OUTPATIENT
Start: 2023-12-21 | End: 2024-02-29 | Stop reason: ALTCHOICE

## 2023-12-21 RX ORDER — OXYCODONE HYDROCHLORIDE 10 MG/1
10 TABLET ORAL EVERY 6 HOURS PRN
Qty: 105 TABLET | Refills: 0 | Status: SHIPPED | OUTPATIENT
Start: 2023-12-26 | End: 2024-01-22 | Stop reason: SDUPTHER

## 2023-12-21 ASSESSMENT — ENCOUNTER SYMPTOMS
BLOOD IN STOOL: 0
DIARRHEA: 0
POLYPHAGIA: 0
DIZZINESS: 0
DYSURIA: 0
ARTHRALGIAS: 1
SLEEP DISTURBANCE: 0
ABDOMINAL PAIN: 1
VOMITING: 0
FATIGUE: 1
CONSTIPATION: 1
ABDOMINAL DISTENTION: 0
HEADACHES: 0
POLYDIPSIA: 0
SHORTNESS OF BREATH: 0
PALPITATIONS: 0
NAUSEA: 0
DIFFICULTY URINATING: 0
MYALGIAS: 1

## 2023-12-21 NOTE — PROGRESS NOTES
Subjective   Patient ID: Luis Condon Jr. is a 43 y.o. male who presents for Hyperlipidemia (Recheck. Review labs) and multiple issues.     Hyperlipidemia  Associated symptoms include chest pain and myalgias. Pertinent negatives include no shortness of breath.      Lipids: high. HDL 37, , , total chol 250. Adherent to plant based diet. Currently not on statin due to previous CK increase. Wants to see nutritionist.    HTN: controlled. Tolerating lisinopril well.    GERD: stable on PPI. Still w/ occ breakthrough.     GI: has chronic bloating and pain. No bldy stool. Started on Ibsrela, doing pelvic floor therapy, Scheduled for EGD and Colonoscopy +constipation. Reports symptoms worsening. Does not feel related to opiates but states symptoms worsening    Mood: stable. Followed by psych    Pulm HTN/MARY JO: Untreated. He has been unable to use CPAP due to issues with the DME company. Needs new rx    Asthma: stable on meds.     BMI: high. Having trouble losing weight. Unable to exercise due to chronic pain.     Predm: stable. A1c 5.8    Chest pain: Noticed increased fatigue with exertion and reoccurrence of left sided chest pain. Onset for weeks.  Previously had an ECHO which showed mild left ventricular hypertrophy.    Review of Systems   Constitutional:  Positive for fatigue.   HENT:  Positive for congestion (chronic).    Eyes:  Negative for visual disturbance.   Respiratory:  Negative for shortness of breath.    Cardiovascular:  Positive for chest pain. Negative for palpitations.   Gastrointestinal:  Positive for abdominal pain and constipation. Negative for abdominal distention, blood in stool, diarrhea, nausea and vomiting.   Endocrine: Negative for cold intolerance, heat intolerance, polydipsia, polyphagia and polyuria.   Genitourinary:  Negative for difficulty urinating and dysuria.   Musculoskeletal:  Positive for arthralgias and myalgias.   Skin:  Negative for rash.   Allergic/Immunologic: Positive  for environmental allergies.   Neurological:  Negative for dizziness and headaches.   Psychiatric/Behavioral:  Negative for sleep disturbance.        Objective   /82   Pulse 83   Temp 36.3 °C (97.4 °F)   Wt 102 kg (224 lb)   SpO2 98%   BMI 30.38 kg/m²     Physical Exam  Vitals and nursing note reviewed.   Constitutional:       General: He is not in acute distress.     Appearance: Normal appearance. He is not toxic-appearing.   HENT:      Head: Normocephalic.      Right Ear: Tympanic membrane normal.      Left Ear: Tympanic membrane normal.      Nose: Nose normal.      Mouth/Throat:      Pharynx: Oropharynx is clear.   Eyes:      General: No scleral icterus.     Pupils: Pupils are equal, round, and reactive to light.   Neck:      Vascular: No carotid bruit.   Cardiovascular:      Rate and Rhythm: Normal rate and regular rhythm.      Pulses: Normal pulses.      Heart sounds: No murmur heard.  Pulmonary:      Effort: Pulmonary effort is normal. No respiratory distress.      Breath sounds: Normal breath sounds.   Abdominal:      General: Bowel sounds are normal.      Palpations: Abdomen is soft.      Tenderness: There is no abdominal tenderness. There is no guarding.   Musculoskeletal:         General: No tenderness.      Cervical back: No rigidity.      Right lower leg: No edema.      Left lower leg: No edema.   Lymphadenopathy:      Cervical: No cervical adenopathy.   Skin:     General: Skin is warm.   Neurological:      General: No focal deficit present.      Mental Status: He is alert.      Cranial Nerves: No cranial nerve deficit.   Psychiatric:         Mood and Affect: Mood normal.         Assessment/Plan   Problem List Items Addressed This Visit             ICD-10-CM    Obstructive sleep apnea - Primary G47.33    Relevant Orders    Positive Airway Pressure (PAP) Therapy    Sjogren's syndrome with keratoconjunctivitis sicca (CMS/[facility]) M35.01    Hyperlipidemia E78.5    Relevant Medications    icosapent  ethyL (Vascepa) 1 gram capsule    Other Relevant Orders    Comprehensive Metabolic Panel    Lipid Panel    Hemoglobin A1C    Referral to Nutrition Services    Essential hypertension I10    Relevant Orders    Referral to Nutrition Services    Prediabetes R73.03    Relevant Orders    Hemoglobin A1C    Referral to Nutrition Services     Other Visit Diagnoses         Codes    Chest pain, unspecified type     R07.9    Relevant Orders    ECG 12 lead (Clinic Performed) (Completed)    Mild intermittent asthma, unspecified whether complicated     J45.20    Relevant Medications    albuterol (ProAir HFA) 90 mcg/actuation inhaler        Recommend a predominant low fat whole foods plant based diet.     Follow up with your cardiologist    Go to the ER if any of your symptoms are significantly worsening.     Follow up with your specialists as scheduled. Try to get gastric emptying study through your GI physician    Start vascepa. You were referred to nutritionist    Return in 3 months, sooner if needed    I saw and evaluated the patient. I personally obtained the key and critical portions of the history and physical exam or was physically present for key and critical portions performed by the resident/fellow. I reviewed the resident/fellow's documentation and discussed the patient with the resident/fellow. I agree with the resident/fellow's medical decision making as documented in the note.    Sherrie Rosas DO       Time Spent  Prep time on day of patient encounter: 5 minutes  Time spent directly with patient, family or caregiver: 25 minutes  Additional Time Spent on Patient Care Activities: 0 minutes  Documentation Time: 10 minutes  Other Time Spent: 5 minutes (reviewed prior echo and consults)  Total: 45 minutes

## 2023-12-21 NOTE — PATIENT INSTRUCTIONS
Recommend a predominant low fat whole foods plant based diet.  Cut back on meat, dairy, processed carbs, salt and oils. Increase fiber in your diet.  Decrease alcohol as much as possible if you drink. Recommend regular exercise most days of the week(goal up to 150min per week). Also recommend good sleep habits aiming for 7-8 hours per night.     Follow up with your cardiologist    Go to the ER if any of your symptoms are significantly worsening.     Follow up with your specialists as scheduled. Try to get gastric emptying study through your GI physician    Start vascepa. You were referred to nutritionist    Return in 3 months, sooner if needed

## 2024-01-03 ENCOUNTER — APPOINTMENT (OUTPATIENT)
Dept: NUTRITION | Facility: CLINIC | Age: 44
End: 2024-01-03
Payer: MEDICARE

## 2024-01-11 ENCOUNTER — CLINICAL SUPPORT (OUTPATIENT)
Dept: ALLERGY | Facility: CLINIC | Age: 44
End: 2024-01-11
Payer: MEDICARE

## 2024-01-11 DIAGNOSIS — J30.89 NON-SEASONAL ALLERGIC RHINITIS, UNSPECIFIED TRIGGER: ICD-10-CM

## 2024-01-11 DIAGNOSIS — J30.1 ALLERGIC RHINITIS DUE TO POLLEN, UNSPECIFIED SEASONALITY: ICD-10-CM

## 2024-01-11 DIAGNOSIS — J30.81 ALLERGIC RHINITIS DUE TO ANIMAL (CAT) (DOG) HAIR AND DANDER: ICD-10-CM

## 2024-01-11 PROCEDURE — 95117 IMMUNOTHERAPY INJECTIONS: CPT | Performed by: ALLERGY & IMMUNOLOGY

## 2024-01-13 DIAGNOSIS — G62.89 SMALL FIBER POLYNEUROPATHY: ICD-10-CM

## 2024-01-15 RX ORDER — GABAPENTIN 600 MG/1
TABLET ORAL
Qty: 810 TABLET | Refills: 1 | Status: SHIPPED | OUTPATIENT
Start: 2024-01-15

## 2024-01-22 ENCOUNTER — APPOINTMENT (OUTPATIENT)
Dept: NUTRITION | Facility: CLINIC | Age: 44
End: 2024-01-22
Payer: MEDICARE

## 2024-01-22 DIAGNOSIS — M35.06 SJOGREN'S SYNDROME WITH PERIPHERAL NERVOUS SYSTEM INVOLVEMENT (MULTI): ICD-10-CM

## 2024-01-22 RX ORDER — OXYCODONE HYDROCHLORIDE 10 MG/1
10 TABLET ORAL EVERY 6 HOURS PRN
Qty: 105 TABLET | Refills: 0 | Status: SHIPPED | OUTPATIENT
Start: 2024-01-22 | End: 2024-02-21 | Stop reason: SDUPTHER

## 2024-01-26 DIAGNOSIS — I27.20 PULMONARY HYPERTENSION (MULTI): ICD-10-CM

## 2024-01-26 DIAGNOSIS — R07.9 CHEST PAIN, UNSPECIFIED TYPE: Primary | ICD-10-CM

## 2024-02-08 ENCOUNTER — CLINICAL SUPPORT (OUTPATIENT)
Dept: ALLERGY | Facility: CLINIC | Age: 44
End: 2024-02-08
Payer: MEDICARE

## 2024-02-08 DIAGNOSIS — J30.1 ALLERGIC RHINITIS DUE TO POLLEN, UNSPECIFIED SEASONALITY: ICD-10-CM

## 2024-02-08 DIAGNOSIS — J30.81 ALLERGIC RHINITIS DUE TO ANIMAL (CAT) (DOG) HAIR AND DANDER: ICD-10-CM

## 2024-02-08 DIAGNOSIS — J30.89 NON-SEASONAL ALLERGIC RHINITIS, UNSPECIFIED TRIGGER: ICD-10-CM

## 2024-02-08 PROCEDURE — 95117 IMMUNOTHERAPY INJECTIONS: CPT | Performed by: ALLERGY & IMMUNOLOGY

## 2024-02-14 ENCOUNTER — NUTRITION (OUTPATIENT)
Dept: NUTRITION | Facility: CLINIC | Age: 44
End: 2024-02-14
Payer: MEDICARE

## 2024-02-14 DIAGNOSIS — I10 ESSENTIAL HYPERTENSION: ICD-10-CM

## 2024-02-14 DIAGNOSIS — Z71.3 DIETARY COUNSELING AND SURVEILLANCE: ICD-10-CM

## 2024-02-14 DIAGNOSIS — M35.00 SJOGREN'S SYNDROME, WITH UNSPECIFIED ORGAN INVOLVEMENT (MULTI): Primary | ICD-10-CM

## 2024-02-14 DIAGNOSIS — R73.03 PREDIABETES: ICD-10-CM

## 2024-02-14 DIAGNOSIS — E78.5 HYPERLIPIDEMIA, UNSPECIFIED HYPERLIPIDEMIA TYPE: ICD-10-CM

## 2024-02-14 PROCEDURE — 97802 MEDICAL NUTRITION INDIV IN: CPT

## 2024-02-14 NOTE — PROGRESS NOTES
"NUTRITION ASSESSMENT NOTE    Reason for Nutrition Visit:  Pt is a 43 y.o. male being seen in person for an initial appointment at [facility] referred for HLD, prediabetes, and essential HTN.      Pt, who is accompanied by wife Judi, states they seek  from dietitian for help address his GI issues that he has struggled with for nearly 15 years.     Anthropometrics:  Wt: 224#, 102kg  Ht: 6'0\"  BMI: 30.38 kg/m2      Past Medical Hx:  Patient Active Problem List   Diagnosis    Abdominal pain    Abnormal EKG    Acquired trigger finger    Acute right ankle pain    Adverse effect of other vaccines and biological substances, initial encounter    Allergic rhinitis due to pollen    Borderline high cholesterol    Bursitis, subacromial    Carpal tunnel syndrome of right wrist    Chemotherapy induced nausea and vomiting    Chronic allergic conjunctivitis    Decreased sense of smell    Disturbance of skin sensation    Episcleritis    Facial pressure    Fatigue    Generalized pain    Headache    Hypertrophy of both inferior nasal turbinates    Impingement syndrome of right shoulder    Shoulder impingement, right    Localized chondromalacia    Male erectile disorder    Moderate persistent asthma    Myalgia    Nasal congestion    Breathing-related sleep disorder    Obstructive sleep apnea    Sleep apnea    Oral thrush    Pain in joint of right shoulder    Right shoulder pain    Pain in joint, hand    Pelvic pain in male    Myositis    Pharyngitis    Poor sleep pattern    Postnasal drip    Prostatitis    Pulmonary hypertension (CMS/HCC)    PVC's (premature ventricular contractions)    Reactive airway disease    Restless leg syndrome    Right ankle strain, initial encounter    Right shoulder strain, initial encounter    Sjogren's syndrome with keratoconjunctivitis sicca (CMS/HCC)    Sjogrens syndrome (CMS/HCC)    Neuropathy, peripheral    Small fiber polyneuropathy    Urinary frequency    Vitamin D insufficiency    Disorder of " sacrum    Instability of right ankle joint    Abdominal bloating    Chronic constipation    Osteochondritis dissecans of knee    Sprain of ankle, right    Sprain of unspecified ligament of right ankle, subsequent encounter    Benign essential hypertension    Generalized anxiety disorder    Hyperlipidemia    Insomnia    Intrinsic asthma without status asthmaticus    Essential hypertension    Severe major depression without psychotic features (CMS/HCC)    Sjogren's syndrome (CMS/HCC)    Prediabetes    Chronic pain syndrome    Depressed mood          Lab Results   Component Value Date    HGBA1C 5.8 (H) 11/30/2023    CHOL 250 (H) 11/30/2023    LDLF 113 (H) 09/01/2023    TRIG 396 (H) 11/30/2023          Food and Nutrition Hx:  Pt referred for HLD, prediabetes, and HTN, however, pt begins by stating he has gone through an immense amount of stomach/GI issues x ~13 years. During this time, pt suffered serious sx and manifestations throughout his body. Pt mentions these GI woes have taken a significant turn for the worse with regard to severity during the last 2 years, with pt constantly living with some form of pain/constipation/reflux/bloating. Pt has cycle through many medications, to include addressing reflux and constipation, all to no avail. Despite what seems like a low intake, pt is putting on weight and unable to get it off. Pt cites that he is utterly miserable x 1 year, with life becoming a severe, constant struggle. Pt has met with numerous GI practitioners to assess, to include colonoscopy and endoscopy, both of which returned mild gastritis and inflammation but nothing more definitive than that. Pt was told he had fibromyalgia, then given a Sjogren's dx rather than Lupus - pt still has some confusion about how medical team came to this dx. Pt then goes on to mention how other sx he experiences include foods seemingly sitting in his stomach and intestines with slow transit times, early satiety, and a feeling of  "difficulty breathing from stomach pressure into his esophagus and trachea, especially laying down at night. He expresses severe discomfort. Pt has gone to the [facility] around 2017/2018 for these GI issues, and was dx with \"leaky gut syndrome.\" Pt has taken their supplement recommendation of \"Metagenics Glutagenics\" to help heal the leaky gut.     Pt is primarily vegan with some pescatarianism (salmon, shrimp), and has been following this diet since 2015. Pt tries to avoid as much processed food as possible, leaning into whole food options. Pt is, however, only eating a small selection of foods over and over again, despite them being healthy in nature. Pt also eating foods plain and simple, often without any sauce or seasonings. No beef or red meat since the age of 20.     Pt has done food allergy tests in the past, the list for positive testing identified below.     The more the pt eats throughout the day, the more his stomach hurts, the more his GI sx develop and worsen.     Given the referring diagnoses, RDN was not expecting today's session to focus strictly on GI health and functioning, and medical nutrition therapy recommendations therein. However, pt and wife were counseled at great length and with tremendous detail with respect to tx of the pt's GI woes. Pt was recommended to resume a mixed dietary pattern focusing on and prioritizing incorporating land (meat) and water (seafood, fish) protein sources, to cook vegetables rather than consuming raw, increase omega-3 and L-glutamine food intakes, create a food log to track food and note safe and unsafe foods, to supplement with slippery elm bark powder, to retain a dairy free status, to increase variety of produce and plant-based items, to increase water intake, to follow a more Mediterranean style diet pattern, to begin eating 3 or more meals/smaller meals to increase nutrient intake, and to keep processed food intake to a " minimum.       DIETARY RECALL: *Pt consumes an average of 1-2 meals/day*  Wake-up: ~6:30am-7am  Meal 1: Usually skips this meal, Formerly --> Juiced organic super greens ([employer]) w/ otis and filtered water, lois seeds/flax seeds  Meal 2: Usually skips this meal, Formerly/Currently --> Raw peppers, carrots, broccoli, cucumbers, hummus, guacamole, [employer] almond flour crackers  Meal 3: Everyday, ~6pm-7pm, Alexa rice, grilled vegetables, peppers, onions, mushrooms, Grandy sprouts, Banza chickpea pasta w/ veggies/ Banza pizza crusts, salmon bowls, veggie egg rolls/spring rolls, tofu, soy sausage, Beyond sausage, Beyond meats  Snacks: Trail mix with fruits and nuts  Bedtime: Insomnia at play, varied timing to include throughout the night  Beverages: Zevia ginger ale, water x 64oz+ daily w/ electrolyte powder, Gatorade Zero x 1-2 per day at most, Simply Lemonade x 1 glass daily, Ginger beer, juice      NUTRITIONAL ARTIFACTS:  Owns and uses an air fryer    Allergies: Oats, rye, casein, wheat, and corn as severe, soy, yeast, beef and tomatoes as moderate  Intolerance: None  Appetite: Good  Intake: >75%    Supplements: Denies     Energy Levels: Low        Nutrition Focused Physical Exam:    Performed: Malnutrition not present      Estimated Energy Needs:    WEIGHT MAINTENANCE: MSJ, 1963 x 1.3 (AF) = 2600 kcal/day  WEIGHT LOSS: 25 kcal/kg IBW = 2000 kcal/day  PROTEIN Needs: 1-1.2 g/kg = 100-120 g/day    Nutrition Diagnosis:    Diagnosis Statement 1:  Diagnosis Status: New  Diagnosis : Altered GI function  related to alteration in gastrointestinal tract structure and/or function as evidenced by  diagnosis of leaky gut via CCCFM and associated GI sx of gut distress to include pain, bloating, reflux, and constipation    Diagnosis Statement 2:  Diagnosis Status: New  Diagnosis : Food and nutrition related knowledge deficit related to lack of or limited prior nutrition-related education as evidenced by verbalizes  inaccurate or incomplete knowledge    Diagnosis Statement 3:   Diagnosis Status: New  Diagnosis : Inadequate protein intake  related to  adherence to vegan diet pattern coupled with GI sx  as evidenced by  dietary recall indicating pt intake of protein at levels <75% daily recommended needs x > 3 months    Nutrition Interventions:  Anti-Inflammatory Diet, Consistent Carbohydrate Diet, Gluten Free Diet, Increased Carbohydrate Diet, Increased Fiber Diet, Increased Fluid Intake, Increased Soluble Fiber, Increased Omega-3 Diet, Increased Protein Diet, Low Saturated Fat Diet, Mediterranean Diet, and Plant Based Diet  Nutrition Counseling: Motivational Interviewing and Health Belief Model  Coordination of Care: None        Educational Handouts: [facility] Medi Diet, [place] Medi Diet, 30DMDMP, ACLM NB's,  ACLM FAMJS, DGDD, Protein Content of Foods List, Recipes, Sjogren's MNT, ADA My Plate, PBP    Readiness to Change : Excellent  Level of Understanding : Excellent  Anticipated Compliant : Excellent

## 2024-02-21 DIAGNOSIS — M35.06 SJOGREN'S SYNDROME WITH PERIPHERAL NERVOUS SYSTEM INVOLVEMENT (MULTI): ICD-10-CM

## 2024-02-21 RX ORDER — OXYCODONE HYDROCHLORIDE 10 MG/1
10 TABLET ORAL EVERY 6 HOURS PRN
Qty: 105 TABLET | Refills: 0 | Status: SHIPPED | OUTPATIENT
Start: 2024-02-21 | End: 2024-03-21 | Stop reason: SDUPTHER

## 2024-02-29 ENCOUNTER — OFFICE VISIT (OUTPATIENT)
Dept: RHEUMATOLOGY | Facility: CLINIC | Age: 44
End: 2024-02-29
Payer: MEDICARE

## 2024-02-29 ENCOUNTER — LAB (OUTPATIENT)
Dept: LAB | Facility: LAB | Age: 44
End: 2024-02-29
Payer: MEDICARE

## 2024-02-29 VITALS
WEIGHT: 236 LBS | BODY MASS INDEX: 31.97 KG/M2 | DIASTOLIC BLOOD PRESSURE: 52 MMHG | OXYGEN SATURATION: 98 % | HEART RATE: 67 BPM | SYSTOLIC BLOOD PRESSURE: 76 MMHG | HEIGHT: 72 IN

## 2024-02-29 DIAGNOSIS — Z79.891 ENCOUNTER FOR LONG-TERM OPIATE ANALGESIC USE: ICD-10-CM

## 2024-02-29 DIAGNOSIS — M35.01 SJOGREN'S SYNDROME WITH KERATOCONJUNCTIVITIS SICCA (MULTI): ICD-10-CM

## 2024-02-29 DIAGNOSIS — M35.01 SJOGREN'S SYNDROME WITH KERATOCONJUNCTIVITIS SICCA (MULTI): Primary | ICD-10-CM

## 2024-02-29 LAB
APPEARANCE UR: CLEAR
BILIRUB UR STRIP.AUTO-MCNC: NEGATIVE MG/DL
CK SERPL-CCNC: 690 U/L (ref 0–325)
COLOR UR: ABNORMAL
CREAT UR-MCNC: 46.4 MG/DL (ref 20–370)
CRP SERPL-MCNC: <0.1 MG/DL
ERYTHROCYTE [SEDIMENTATION RATE] IN BLOOD BY WESTERGREN METHOD: <1 MM/H (ref 0–15)
GLUCOSE UR STRIP.AUTO-MCNC: NEGATIVE MG/DL
HOLD SPECIMEN: NORMAL
KETONES UR STRIP.AUTO-MCNC: NEGATIVE MG/DL
LEUKOCYTE ESTERASE UR QL STRIP.AUTO: NEGATIVE
NITRITE UR QL STRIP.AUTO: NEGATIVE
PH UR STRIP.AUTO: 7 [PH]
PROT UR STRIP.AUTO-MCNC: NEGATIVE MG/DL
PROT UR-ACNC: <4 MG/DL (ref 5–25)
PROT/CREAT UR: ABNORMAL MG/G{CREAT}
RBC # UR STRIP.AUTO: NEGATIVE /UL
SP GR UR STRIP.AUTO: 1
UROBILINOGEN UR STRIP.AUTO-MCNC: <2 MG/DL

## 2024-02-29 PROCEDURE — 82570 ASSAY OF URINE CREATININE: CPT

## 2024-02-29 PROCEDURE — 84156 ASSAY OF PROTEIN URINE: CPT

## 2024-02-29 PROCEDURE — 86140 C-REACTIVE PROTEIN: CPT

## 2024-02-29 PROCEDURE — 3008F BODY MASS INDEX DOCD: CPT | Performed by: INTERNAL MEDICINE

## 2024-02-29 PROCEDURE — 99214 OFFICE O/P EST MOD 30 MIN: CPT | Performed by: INTERNAL MEDICINE

## 2024-02-29 PROCEDURE — 85652 RBC SED RATE AUTOMATED: CPT

## 2024-02-29 PROCEDURE — 82550 ASSAY OF CK (CPK): CPT

## 2024-02-29 PROCEDURE — 81003 URINALYSIS AUTO W/O SCOPE: CPT

## 2024-02-29 PROCEDURE — 36415 COLL VENOUS BLD VENIPUNCTURE: CPT

## 2024-02-29 PROCEDURE — 3074F SYST BP LT 130 MM HG: CPT | Performed by: INTERNAL MEDICINE

## 2024-02-29 PROCEDURE — 1036F TOBACCO NON-USER: CPT | Performed by: INTERNAL MEDICINE

## 2024-02-29 PROCEDURE — 3078F DIAST BP <80 MM HG: CPT | Performed by: INTERNAL MEDICINE

## 2024-02-29 RX ORDER — CYCLOBENZAPRINE HCL 10 MG
10 TABLET ORAL 2 TIMES DAILY
Qty: 60 TABLET | Refills: 0 | Status: SHIPPED | OUTPATIENT
Start: 2024-02-29 | End: 2024-04-18

## 2024-02-29 ASSESSMENT — ENCOUNTER SYMPTOMS
PSYCHIATRIC NEGATIVE: 1
FACIAL SWELLING: 0
ARTHRALGIAS: 1
CONSTIPATION: 1
WEAKNESS: 1
FEVER: 0
FATIGUE: 1
MYALGIAS: 1
ACTIVITY CHANGE: 1
EYES NEGATIVE: 1
DIARRHEA: 1
RESPIRATORY NEGATIVE: 1
DIZZINESS: 0

## 2024-02-29 NOTE — PROGRESS NOTES
Subjective   Patient ID: Luis Condon Jr. is a 43 y.o. male who presents for Follow-up (3 mo fuv).    HPI  43 y.o male patient presents for follow up for Sjogren's, possible small fiber neuropathy.    At previous appointments was changed from tizanidine to baclofen and had him continue his oxycodone. Today, patient would like to be switched to flexeril.     IBS- Since being seen last, patient has been seen by Gastroenterologist, and had an EGD performed with negative findings (2/1/24). Was referred to another Gastroenterologist.     Ibsrela for constipation- states he had diarrhea and was having a bowel movement from 9 pm to 2 am. Patient would like to consider adjusting med for more balance.     Patient also notes swelling in legs with left being worse than right. Patient believes it could be due fluid accumulation in legs. Patient states that he can walk very short distances and legs feel as thought they are going to explode.    Carpal Tunnel- has been seeing orthopedics concerning carpal tunnel. Dr. Yan (neurology) now in agreement with carpal tunnel surgery. Patient states that he is not sure at this time.    Dry eyes- Patient reports dry eyes lately. Patient states that it feels like small rocks are in eyes.    Pain is rated 9/10 for the past two weeks. Patient denies any constitutional symptoms at this time    Review of Systems   Constitutional:  Positive for activity change and fatigue. Negative for fever.   HENT:  Negative for dental problem, facial swelling and mouth sores.    Eyes: Negative.    Respiratory: Negative.     Cardiovascular:  Positive for leg swelling.   Gastrointestinal:  Positive for constipation and diarrhea.   Genitourinary: Negative.    Musculoskeletal:  Positive for arthralgias, gait problem and myalgias.   Neurological:  Positive for weakness. Negative for dizziness.   Psychiatric/Behavioral: Negative.           Objective   Physical Exam  Constitutional:       Appearance: Normal  appearance.   HENT:      Head: Normocephalic and atraumatic.      Nose: Nose normal.   Eyes:      Extraocular Movements: Extraocular movements intact.   Cardiovascular:      Rate and Rhythm: Normal rate and regular rhythm.      Pulses: Normal pulses.      Heart sounds: Normal heart sounds.   Pulmonary:      Effort: Pulmonary effort is normal.      Breath sounds: Normal breath sounds.   Musculoskeletal:         General: Swelling and tenderness present.      Cervical back: Normal range of motion.   Skin:     General: Skin is warm.      Capillary Refill: Capillary refill takes 2 to 3 seconds.   Neurological:      General: No focal deficit present.      Mental Status: He is alert and oriented to person, place, and time. Mental status is at baseline.   Psychiatric:         Mood and Affect: Mood normal.       Assessment/Plan   Diagnoses and all orders for this visit:  Sjogren's syndrome with keratoconjunctivitis sicca (CMS/HCC)  -     Urinalysis with Reflex Culture and Microscopic; Future  -     Protein, Urine Random; Future  -     C-Reactive Protein; Future  -     Sedimentation Rate; Future  -     Creatine Kinase; Future  -     cyclobenzaprine (Flexeril) 10 mg tablet; Take 1 tablet (10 mg) by mouth 2 times a day.     Muscle markers, inflammatory markers, urine protein and kidney function    Switched to Flexeril      Blaise Case DPM 02/29/24 9:55 AM

## 2024-03-07 ENCOUNTER — CLINICAL SUPPORT (OUTPATIENT)
Dept: ALLERGY | Facility: CLINIC | Age: 44
End: 2024-03-07
Payer: MEDICARE

## 2024-03-07 DIAGNOSIS — J30.89 NON-SEASONAL ALLERGIC RHINITIS, UNSPECIFIED TRIGGER: ICD-10-CM

## 2024-03-07 DIAGNOSIS — J30.81 ALLERGIC RHINITIS DUE TO ANIMAL (CAT) (DOG) HAIR AND DANDER: ICD-10-CM

## 2024-03-07 DIAGNOSIS — J30.1 ALLERGIC RHINITIS DUE TO POLLEN, UNSPECIFIED SEASONALITY: ICD-10-CM

## 2024-03-07 PROCEDURE — 95117 IMMUNOTHERAPY INJECTIONS: CPT | Performed by: ALLERGY & IMMUNOLOGY

## 2024-03-11 PROBLEM — K21.9 GERD (GASTROESOPHAGEAL REFLUX DISEASE): Status: ACTIVE | Noted: 2024-03-11

## 2024-03-13 ENCOUNTER — APPOINTMENT (OUTPATIENT)
Dept: CARDIOLOGY | Facility: CLINIC | Age: 44
End: 2024-03-13
Payer: MEDICARE

## 2024-03-13 DIAGNOSIS — K21.9 GASTROESOPHAGEAL REFLUX DISEASE WITHOUT ESOPHAGITIS: ICD-10-CM

## 2024-03-14 RX ORDER — OMEPRAZOLE 40 MG/1
40 CAPSULE, DELAYED RELEASE ORAL
Qty: 90 CAPSULE | Refills: 1 | OUTPATIENT
Start: 2024-03-14 | End: 2024-09-10

## 2024-03-19 DIAGNOSIS — E78.5 HYPERLIPIDEMIA, UNSPECIFIED HYPERLIPIDEMIA TYPE: ICD-10-CM

## 2024-03-21 ENCOUNTER — OFFICE VISIT (OUTPATIENT)
Dept: PRIMARY CARE | Facility: CLINIC | Age: 44
End: 2024-03-21
Payer: MEDICARE

## 2024-03-21 VITALS
DIASTOLIC BLOOD PRESSURE: 82 MMHG | SYSTOLIC BLOOD PRESSURE: 124 MMHG | HEART RATE: 72 BPM | WEIGHT: 232 LBS | OXYGEN SATURATION: 97 % | BODY MASS INDEX: 31.46 KG/M2

## 2024-03-21 DIAGNOSIS — E66.9 OBESITY WITH SERIOUS COMORBIDITY, UNSPECIFIED CLASSIFICATION, UNSPECIFIED OBESITY TYPE: ICD-10-CM

## 2024-03-21 DIAGNOSIS — M35.06 SJOGREN'S SYNDROME WITH PERIPHERAL NERVOUS SYSTEM INVOLVEMENT (MULTI): ICD-10-CM

## 2024-03-21 DIAGNOSIS — E78.5 HYPERLIPIDEMIA, UNSPECIFIED HYPERLIPIDEMIA TYPE: ICD-10-CM

## 2024-03-21 DIAGNOSIS — I10 ESSENTIAL HYPERTENSION: ICD-10-CM

## 2024-03-21 DIAGNOSIS — R73.03 PREDIABETES: ICD-10-CM

## 2024-03-21 DIAGNOSIS — J45.20 MILD INTERMITTENT ASTHMA, UNSPECIFIED WHETHER COMPLICATED (HHS-HCC): ICD-10-CM

## 2024-03-21 DIAGNOSIS — K59.09 CHRONIC CONSTIPATION: ICD-10-CM

## 2024-03-21 DIAGNOSIS — G57.52 TARSAL TUNNEL SYNDROME OF LEFT SIDE: ICD-10-CM

## 2024-03-21 DIAGNOSIS — K21.9 GASTROESOPHAGEAL REFLUX DISEASE WITHOUT ESOPHAGITIS: ICD-10-CM

## 2024-03-21 DIAGNOSIS — G47.33 OSA (OBSTRUCTIVE SLEEP APNEA): Primary | ICD-10-CM

## 2024-03-21 PROCEDURE — 3074F SYST BP LT 130 MM HG: CPT | Performed by: FAMILY MEDICINE

## 2024-03-21 PROCEDURE — 1036F TOBACCO NON-USER: CPT | Performed by: FAMILY MEDICINE

## 2024-03-21 PROCEDURE — 99215 OFFICE O/P EST HI 40 MIN: CPT | Performed by: FAMILY MEDICINE

## 2024-03-21 PROCEDURE — 3079F DIAST BP 80-89 MM HG: CPT | Performed by: FAMILY MEDICINE

## 2024-03-21 PROCEDURE — 3008F BODY MASS INDEX DOCD: CPT | Performed by: FAMILY MEDICINE

## 2024-03-21 RX ORDER — OMEPRAZOLE 40 MG/1
40 CAPSULE, DELAYED RELEASE ORAL
Qty: 90 CAPSULE | Refills: 1 | Status: SHIPPED | OUTPATIENT
Start: 2024-03-21 | End: 2024-09-17

## 2024-03-21 RX ORDER — OXYCODONE HYDROCHLORIDE 10 MG/1
10 TABLET ORAL EVERY 6 HOURS PRN
Qty: 105 TABLET | Refills: 0 | Status: SHIPPED | OUTPATIENT
Start: 2024-03-21 | End: 2024-04-18 | Stop reason: SDUPTHER

## 2024-03-21 RX ORDER — ICOSAPENT ETHYL 1 G/1
2 CAPSULE ORAL
Qty: 120 CAPSULE | Refills: 2 | Status: SHIPPED | OUTPATIENT
Start: 2024-03-21 | End: 2024-06-19

## 2024-03-21 RX ORDER — LUBIPROSTONE 24 UG/1
24 CAPSULE ORAL 2 TIMES DAILY
Qty: 180 CAPSULE | Refills: 1 | Status: SHIPPED | OUTPATIENT
Start: 2024-03-21 | End: 2024-09-17

## 2024-03-21 RX ORDER — SEMAGLUTIDE 0.25 MG/.5ML
0.25 INJECTION, SOLUTION SUBCUTANEOUS
Qty: 2 ML | Refills: 0 | Status: SHIPPED | OUTPATIENT
Start: 2024-03-21 | End: 2024-04-18 | Stop reason: SDUPTHER

## 2024-03-21 RX ORDER — ALBUTEROL SULFATE 90 UG/1
1-2 AEROSOL, METERED RESPIRATORY (INHALATION) EVERY 4 HOURS PRN
Qty: 8 G | Refills: 1 | Status: SHIPPED | OUTPATIENT
Start: 2024-03-21 | End: 2024-04-22 | Stop reason: SDUPTHER

## 2024-03-21 RX ORDER — ICOSAPENT ETHYL 1000 MG/1
CAPSULE ORAL
Qty: 120 CAPSULE | Refills: 2 | OUTPATIENT
Start: 2024-03-21

## 2024-03-21 RX ORDER — LISINOPRIL 20 MG/1
20 TABLET ORAL DAILY
Qty: 90 TABLET | Refills: 1 | Status: CANCELLED | OUTPATIENT
Start: 2024-03-21 | End: 2024-09-17

## 2024-03-21 ASSESSMENT — ENCOUNTER SYMPTOMS
HYPERTENSION: 1
HEADACHES: 0
VOMITING: 0
CONSTIPATION: 1
POLYDIPSIA: 0
DIZZINESS: 0
ABDOMINAL PAIN: 0
DIARRHEA: 0
SHORTNESS OF BREATH: 0
DYSPHORIC MOOD: 0
SLEEP DISTURBANCE: 0
FATIGUE: 1
ARTHRALGIAS: 1
POLYPHAGIA: 0
DYSURIA: 0
BLOOD IN STOOL: 0
MYALGIAS: 1
PALPITATIONS: 0
NAUSEA: 0
ABDOMINAL DISTENTION: 0

## 2024-03-21 NOTE — PROGRESS NOTES
"Subjective   Patient ID: Luis Condon Jr. is a 43 y.o. male who presents for Hypertension (Recheck ) multiple issues.  Did not have blood work    Hypertension  Pertinent negatives include no chest pain, headaches, palpitations or shortness of breath.      Hypertension: Stable  Prediabetes/lipids: Due for recheck  Constipation: Chronic and recurrent.  Had repeat EGD and colonoscopy and told essentially normal.  Per chart, patient had 1 polyp.  Placed on new med for constipation but \"made him sick\".  Requesting refill Amitiza  GERD: Stable on PPI  MARY JO: Has not been able to get new machine.  Had mild MARY JO over 5 years ago.  Need to repeat study  Asthma: Stable with as needed albuterol  BMI: Weight continues to trend up.  Did see nutrition who recommended more meat intake.  Patient started to incorporate but not feeling better.  Asking for weight loss med.  Would like to trial Wegovy  Left foot pain: Onset 4-6 weeks.  No injury.  Has tingling side of foot when supinates ankle.  No edema    Review of Systems   Constitutional:  Positive for fatigue.   HENT: Negative.     Eyes:  Negative for visual disturbance.   Respiratory:  Negative for shortness of breath.    Cardiovascular:  Negative for chest pain and palpitations.   Gastrointestinal:  Positive for constipation. Negative for abdominal distention, abdominal pain, blood in stool, diarrhea, nausea and vomiting.   Endocrine: Negative for cold intolerance, heat intolerance, polydipsia, polyphagia and polyuria.   Genitourinary:  Negative for dysuria.   Musculoskeletal:  Positive for arthralgias and myalgias.   Skin:  Negative for rash.   Allergic/Immunologic: Positive for environmental allergies.   Neurological:  Negative for dizziness and headaches.   Psychiatric/Behavioral:  Negative for dysphoric mood and sleep disturbance.        Objective   /82   Pulse 72   Wt 105 kg (232 lb)   SpO2 97%   BMI 31.46 kg/m²     Physical Exam  Vitals and nursing note " reviewed.   Constitutional:       General: He is not in acute distress.     Appearance: Normal appearance. He is not toxic-appearing.   HENT:      Head: Normocephalic.      Nose: Nose normal.   Eyes:      Pupils: Pupils are equal, round, and reactive to light.   Neck:      Vascular: No carotid bruit.   Cardiovascular:      Rate and Rhythm: Normal rate and regular rhythm.      Pulses: Normal pulses.      Heart sounds: No murmur heard.  Pulmonary:      Effort: Pulmonary effort is normal. No respiratory distress.      Breath sounds: Normal breath sounds.   Abdominal:      General: Bowel sounds are normal.      Palpations: Abdomen is soft.      Tenderness: There is no abdominal tenderness. There is no guarding.   Musculoskeletal:      Cervical back: Neck supple.      Right lower leg: No edema.      Left lower leg: No edema.      Comments: Positive Tinel's at left ankle, posterior lateral malleolus.  No edema   Lymphadenopathy:      Cervical: No cervical adenopathy.   Skin:     General: Skin is warm.   Neurological:      General: No focal deficit present.      Mental Status: He is alert.      Cranial Nerves: No cranial nerve deficit.   Psychiatric:         Mood and Affect: Mood normal.         Assessment/Plan   Problem List Items Addressed This Visit             ICD-10-CM    Chronic constipation K59.09    Relevant Medications    lubiprostone (Amitiza) 24 mcg capsule    Hyperlipidemia E78.5    Relevant Medications    icosapent ethyL (Vascepa) 1 gram capsule    Essential hypertension I10    Prediabetes R73.03    GERD (gastroesophageal reflux disease) K21.9    Relevant Medications    omeprazole (PriLOSEC) 40 mg DR capsule     Other Visit Diagnoses         Codes    MARY JO (obstructive sleep apnea)    -  Primary G47.33    Relevant Orders    Home sleep apnea test (HSAT)    Mild intermittent asthma, unspecified whether complicated     J45.20    Relevant Medications    albuterol (ProAir HFA) 90 mcg/actuation inhaler    Obesity with  serious comorbidity, unspecified classification, unspecified obesity type     E66.9    Relevant Medications    semaglutide, weight loss, (Wegovy) 0.25 mg/0.5 mL pen injector    BMI 31.0-31.9,adult     Z68.31    Relevant Medications    semaglutide, weight loss, (Wegovy) 0.25 mg/0.5 mL pen injector    Tarsal tunnel syndrome of left side     G57.52          Reviewed prior blood work and consults.  Recommendations given.  Discussed side effects of Wegovy.  Also recommended patient discuss increasing Wellbutrin with psychiatry  Time Spent  Prep time on day of patient encounter: 5 minutes  Time spent directly with patient, family or caregiver: 35 minutes  Additional Time Spent on Patient Care Activities: 0 minutes  Documentation Time: 5 minutes  Other Time Spent: 0 minutes  Total: 45 minutes      Over 1/2 time spent counseling

## 2024-03-21 NOTE — PATIENT INSTRUCTIONS
Recommend a predominant low fat whole foods plant based diet.  Cut back on meat, dairy, processed carbs, salt and oils. Increase fiber in your diet.  Decrease alcohol as much as possible if you drink. Recommend regular exercise most days of the week(goal up to 150min per week). Also recommend good sleep habits aiming for 7-8 hours per night.     Obtain blood work    Start wegovy    Continue your other meds    See ortho for your foot issues    Follow up with your specialists as scheduled    You were referred for sleep study    Return in 1 months, sooner if needed

## 2024-03-28 ENCOUNTER — CLINICAL SUPPORT (OUTPATIENT)
Dept: SLEEP MEDICINE | Facility: HOSPITAL | Age: 44
End: 2024-03-28
Payer: MEDICARE

## 2024-03-28 ENCOUNTER — TELEMEDICINE (OUTPATIENT)
Dept: NEUROLOGY | Facility: HOSPITAL | Age: 44
End: 2024-03-28
Payer: MEDICARE

## 2024-03-28 DIAGNOSIS — M79.2 NEUROPATHIC PAIN: Primary | ICD-10-CM

## 2024-03-28 DIAGNOSIS — G47.33 OSA (OBSTRUCTIVE SLEEP APNEA): ICD-10-CM

## 2024-03-28 DIAGNOSIS — G62.9 SMALL FIBER NEUROPATHY: ICD-10-CM

## 2024-03-28 PROCEDURE — 99215 OFFICE O/P EST HI 40 MIN: CPT | Mod: 95 | Performed by: PSYCHIATRY & NEUROLOGY

## 2024-03-28 PROCEDURE — 95806 SLEEP STUDY UNATT&RESP EFFT: CPT | Performed by: ALLERGY & IMMUNOLOGY

## 2024-03-28 PROCEDURE — 3008F BODY MASS INDEX DOCD: CPT | Performed by: PSYCHIATRY & NEUROLOGY

## 2024-03-28 PROCEDURE — 99215 OFFICE O/P EST HI 40 MIN: CPT | Performed by: PSYCHIATRY & NEUROLOGY

## 2024-03-28 ASSESSMENT — PATIENT HEALTH QUESTIONNAIRE - PHQ9
1. LITTLE INTEREST OR PLEASURE IN DOING THINGS: SEVERAL DAYS
SUM OF ALL RESPONSES TO PHQ9 QUESTIONS 1 AND 2: 2
10. IF YOU CHECKED OFF ANY PROBLEMS, HOW DIFFICULT HAVE THESE PROBLEMS MADE IT FOR YOU TO DO YOUR WORK, TAKE CARE OF THINGS AT HOME, OR GET ALONG WITH OTHER PEOPLE: SOMEWHAT DIFFICULT
2. FEELING DOWN, DEPRESSED OR HOPELESS: SEVERAL DAYS

## 2024-03-28 ASSESSMENT — PAIN SCALES - GENERAL: PAINLEVEL: 7

## 2024-03-28 NOTE — PROGRESS NOTES
History of Present Illness    Patient seen in follow up of his generalized pain and paresthesias, and presumed small fiber neuropathy.     Telehealth visit performed - originating site at PATIENT LOCATION of Home and distant site--PROVIDER LOCATION--in CLINIC.   - Verbal consent to participate in video visit obtained. This visit occurred during 2020 COVID19 pandemic. I discussed with patient nature of telehealth visits, that:   - I will evaluate patient and recommend diagnostics and treatments based on my assessment   - Our sessions are not being recorded and personal health information is protected   - Our team will provide follow up care in person if/when needed.   - Benefits of avoiding travel during the time of this coronavirus reviewed with patient.    INTERIM HISTORY SINCE 8/3/23  44 yo RH AAM last seen 8/3/23. Generalized pian, paresthesias in setting of Sjogren's (reportedly positive STEPHANY and SSB; negative anti-DS DNA, citrulline antibody, RF - I have not seen these lab results) with sicca symptoms, fibromyalgia, irritable bowel syndrome. Previous EMG: mild R CTS, no large fiber peripheral neuropathy, cervical radiculopathy, or denervatimg myopathy. MUSCLE BIOPSY - R quadriceps - 1/20/12 - SKELETAL MUSCLE DEMONSTRATING PROMINENT ENDOMYSIAL AND PERIMYSIAL FIBROUS TISSUE. NO INFLAMMATION, VASCULITIS, MUSCLE FIBER DEGENERATION. MAY INDICATE NON-INFLAMMATORY MUSCLE INVOLVEMENT IN COLLAGEN VASCULAR DISEASES. Punch skin biopsy 2014 essentially normal, with some mintor findings that may suggest small fiber neuropathy,    -Clinical presentation consistent with small fiber neuropathy, with superimposed bilateral CTS.    Small fiber neuropathy - Currently on combination of Gabapentin 1900 mg tid after increasing from 1800 mg tid at last visit (had previously taken as much as 2100 mg tid, although thinks he may have been calculating the amount incorrectly at that time). Also takes alprazolam, Meloxicam, Oxycodone,  Turmeric 500 mg bid for cramps, and at various times short courses of prednisone for symptomatic relief of pain and cramps. Advil prn. Narcan at home. At last visit with Rheuatology, started Flexeril for pain and cramps, and stopped baclofen and tizanidine. He does not think the increased Gabapentin dose at last visit, nor the recent addition of Flexeril, has helped the pain and extreme skin hypersensitivity. Describes excrutiating surface level pain in his calves and shins, especially if he's on his feet a lot. Feet feel tight and swollen, and red.      Treatment History: Pulse Solu-Medrol 1000 mg daily 3 days March, 2017, helped pain. Cymbalta, Lyrica, Topamax, Methotrexate - without pain relief. Imuran and CellCept ineffective. Unable to tolerate Cytoxan - nausea, fatigue. IVIG in past - 1 dose every 3 weeks - back in 2017, 2018 was somewhat helpful, but discontinued and received 2 infusions of Rituxan August, 2018, which did not help symptoms. Pain flared after stopping IVIG. Restarted Methotrexate - injectable form, which he had not had in past, but not effective. Thus, has failed Imuran, CellCept, methotrexate, Cytoxan, ? IVIG (there is some documentation that it helped for a while in 2017, 2018), Rituxan, Cymbalta, topamax. High dose steroids caused side effects. Followed by rheumatology - does not feel would be helpful to retry these meds, some of which have already been retried, or to try any new meds.     Pain management in past - Dr. Fan and then Dr. Santana.  Sees psychiatrist, Dr. Eliazar Doty - outside of . Was planning to start him on Buspar, but was on back order.    ROS:  - 15 - 20 pound weight gain in the past few weeks. Saw PCP. Sent to dietician - advised to change from plant based diet to more protein, but continues to gain weight. Has noted that legs are swollen lately.   - Very tired all the time, wakes up feeling tired and short of breath. Also short of breath walking upstairs. Has been  "ascribed to his weight gain. Had mild MARY JO 5 years ago, repeat sleep study ordered. Has been referred to Cardiology.    - Pelvic floor muscles very tight - abnormal pelvic floor movement by testing. Has been doing pelvic floor rehabilitation.   - Constipation and bloating alternating with diarrhea - IBS - has tried various remedies. Followed by GI. Recent unremarkable colonoscopy and EGD. Brother has Chrohn's Disease.   - Dry eyes lately    - bilateral CTS - bilateral wrist splints. Steroid injection R wrist March, 2017 helped for a while, but both hands felt worse at last visit. Also, had steroid injection R hand by rheumatology in the past - was having sharp pains at base of R index finger, for which steroid injection did help.     Saw Dr. Carl Marx, hand surgery, 9/19/23 Per Dr. Marx, \"Review of EMG study right upper extremity dated May 18, 2015 reveals evidence of mild right carpal tunnel syndrome. Impression: Bilateral carpal tunnel syndrome. Plan: He has never had any formal treatment or injections for the left side and he did well with injection on the right side in the past. He has not been offered repeat injections. He understands that surgery is likely his best option for definitive improvement of his symptoms but he is not interested in consideration of surgery at this point. He has requested injections into his carpal tunnels.\" Bilateral steroid injections done by Dr. Marx. He tolerated the injections well. Post injection instructions provided. Advised to return as needed for recurrence or progression of problems.    Initially relief of symptoms in hands for about a month, then recurred end of November, 2023.     - pain L big toe - foot doctor - Dr. Mansfield - took x-rays - recommended surgery L big toe for bunion and arthritis/inflamation in [facility], but patient declined surgery - not clear it would help. Wears splint on L big toe at night.     - Now complains of pain and tingling on the lateral " side of the left foot, especially if he everts his foot sideways or dorsiflexes his foot - feels a spike like pins and needles sensation down the lateral side of his foot. He says that when he touches his foot, it feels normal. Saw his PCP for this. Had Tinel's tapping on posterior lateral malleolus - shooting pain down the lateral side of the foot. She suggested possible tarsal tunnel syndrome, and suggested orthopedic referral.     - R shoulder pain - worsened by neck movements in past, now stable. Did PT. Also had shot in shoulder in the past. Also has R ankle pain. Not much help. Wears brace on R ankle.       Neuro Exam:  Mental status unremarkable to informal testing. History related in good detail with no obvious deficit of attention, memory, or language. Fund of knowledge adequate. Orientation intact to person, place, and time. Spontaneous speech fluent with no paraphasic or aphasic errors. Moves arm, hands, legs well - no muscle wasting appreciated over video feed. No pitting edema appreciated when I asked patient to press in on his lower leg.     NEUROLOGICAL EXAM in past:  On neurologic examination today, mental status unremarkable to informal testing. He looks comfortable sitting in the chair. History related in quite good detail with no obvious deficit of attention, memory or language. Fund of knowledge adequate. Orientation intact to person, place and time. Spontaneous speech fluent with no paraphasic or aphasic errors. On cranial nerve exam, pupils are equal, round and reactive to light. Extraocular movements full, without nystagmus. There is no bulbofacial weakness or ptosis. Tongue midline with no wasting or fasciculations. Palate elevates symmetrically. Facial sensation intact to light touch and cold bilaterally. Neck flexion and extension full strength. Motor examination reveals normal tone and bulk in upper and lower extremities bilaterally. No fasciculations. Full strength proximal and distal  muscles upper and lower extremities bilaterally. Deep tendon reflexes difficult to elicit throughout upper and lower extremities bilaterally, including ankle jerks.Sensory examination reveals decreased pin prick and cold in all digits bilaterally up to the forearms and decreased pinprick and cold in distal lower legs to shins. Vibration slightly decreased at ankles and fingertips. Gait slow, due to pain. Wearing a walking boot R leg. On functional testing, patient can arise from chair with some difficulty, which he says is due to pain and the fact that he has the boot on R leg.    Provider Impression/Plan:    IMPRESSION:  Diffuse pain and distal sensory loss to pinprick and cold on previous exams with numbness and tingling, and probable bilateral carpal tunnel syndrome,in setting of Sjogrens and fibromyalgia - examination consistent with primarily SMALL FIBER SENSORY neuropathy, likely secondary to underlying autoimmune disorder, with superimposed bilateral carpal tunnel syndrome. Muscle biopsy abnormal - consistent with NON-INFLAMMATORY MUSCLE DISORDER, as can be seen in collagen vascular disease (increased epimysial and perimysial fibrosis).     PLAN:   - Carpal tunnel syndrome - bilaterally - Bilateral steroid injections by Dr. Marx were temporarily helpful. I think he will likely need bilateral carpal tunnel release surgery.      -  Small fiber neuropathy - He continues to be very disabled by pain and small fiber neuropathy symptoms. Multiple regimens including membrane stabilizers, immunosuppressants, ketamine infusion,and Rituxan have not been successful. IVIG may have been helpful, after a careful review of his records. He seems to be having many systemic issues without a clear etiology at the moment, including unexplained weight gain, shortness of breath, increased swelling in his legs, tightness of his pelvic floor muscles, and more GI symptoms. Gabapentin dose very high at the moment, and I do not want  to go any higher on this until he has had further investigation of his other symptoms, which is in process. I explained to him that Gabapentin can cause swelling, although he has been on Gabapentin for a long time, without those side effects. I will follow his progress with his further evaluation, and be in touch with him regarding other lab studies I may want to obtain, including screening tests for amyloid., given his painful neuropathy, bilateral carpal tunnel syndrome, GI symptoms, and other systemic issues.      - Left lateral foot pain -does not sound like Tarsal Tunnel Syndrome - symptoms sound more like irritation of the left sural nerve. I will order a Neuromuscular ultrasound of the left sural nerve, and evaluation for possible tarsal tunnel syndrome.     - Continue rheumatological follow up.     - I will be in touch with him after the Neuromuscular Ultrasound is completed, and after he has seen cardiology, and discuss further options at that time. After reviewing his records, I am wondering whether another course of IVIG may be helpful.     - Continue social distancing. He has increased risk factors, including having an autoimmune disorder, chronic asthma, and being .     He knows that he can call me as needed and let me know how he is doing. [phone].    Polina Yan M.D.,Ph.D.

## 2024-03-29 PROBLEM — G62.9 NEUROPATHY, PERIPHERAL: Status: RESOLVED | Noted: 2023-01-25 | Resolved: 2024-03-29

## 2024-04-04 ENCOUNTER — OFFICE VISIT (OUTPATIENT)
Dept: ALLERGY | Facility: CLINIC | Age: 44
End: 2024-04-04
Payer: MEDICARE

## 2024-04-04 DIAGNOSIS — J30.89 ALLERGIC RHINITIS DUE TO OTHER ALLERGIC TRIGGER, UNSPECIFIED SEASONALITY: Primary | ICD-10-CM

## 2024-04-04 PROCEDURE — 95117 IMMUNOTHERAPY INJECTIONS: CPT | Performed by: ALLERGY & IMMUNOLOGY

## 2024-04-04 PROCEDURE — 3008F BODY MASS INDEX DOCD: CPT | Performed by: ALLERGY & IMMUNOLOGY

## 2024-04-09 PROBLEM — G62.9 SMALL FIBER NEUROPATHY: Status: ACTIVE | Noted: 2024-04-09

## 2024-04-09 PROBLEM — M79.2 NEUROPATHIC PAIN: Status: ACTIVE | Noted: 2024-04-09

## 2024-04-09 NOTE — PATIENT INSTRUCTIONS
- Continue rheumatological follow up.      - I will be in touch with you after the Neuromuscular Ultrasound is completed, and after seeing cardiology, and discuss further options at that time. After reviewing your records, I am wondering whether another course of IVIG may be helpful.      - Continue social distancing. You have increased risk factors, including having an autoimmune disorder, chronic asthma, and being .      You can call me as needed and let me know how you are doing. [phone].

## 2024-04-12 DIAGNOSIS — M79.2 NEUROPATHIC PAIN: Primary | ICD-10-CM

## 2024-04-15 DIAGNOSIS — G47.33 OSA (OBSTRUCTIVE SLEEP APNEA): Primary | ICD-10-CM

## 2024-04-16 ENCOUNTER — TELEPHONE (OUTPATIENT)
Dept: PRIMARY CARE | Facility: CLINIC | Age: 44
End: 2024-04-16
Payer: MEDICARE

## 2024-04-16 DIAGNOSIS — E66.9 OBESITY WITH SERIOUS COMORBIDITY, UNSPECIFIED CLASSIFICATION, UNSPECIFIED OBESITY TYPE: ICD-10-CM

## 2024-04-16 NOTE — TELEPHONE ENCOUNTER
Pt calling to check status of Wegovy.  Pt states the pharmacy sent us something for an auth but they haven't heard back yet.  I do not see anything in chart.  Pt states this was a few weeks ago.

## 2024-04-16 NOTE — TELEPHONE ENCOUNTER
----- Message from Faye Shields MA sent at 4/16/2024  7:44 AM EDT -----    ----- Message -----  From: Sherrie Rosas DO  Sent: 4/15/2024   9:15 PM EDT  To: #    Pls tell pt that he has mild MARY JO. Order placed for CPAP

## 2024-04-16 NOTE — TELEPHONE ENCOUNTER
Called pt and pt informed of provider message.  Pt also instructed to contact insurance for in network DME company and to call back with name of company and fax # and we can send order to that company.   18

## 2024-04-17 DIAGNOSIS — R52 PAIN, UNSPECIFIED: ICD-10-CM

## 2024-04-17 RX ORDER — MELOXICAM 15 MG/1
15 TABLET ORAL DAILY PRN
Qty: 30 TABLET | Refills: 5 | Status: SHIPPED | OUTPATIENT
Start: 2024-04-17

## 2024-04-18 DIAGNOSIS — M35.06 SJOGREN'S SYNDROME WITH PERIPHERAL NERVOUS SYSTEM INVOLVEMENT (MULTI): ICD-10-CM

## 2024-04-18 DIAGNOSIS — M35.01 SJOGREN'S SYNDROME WITH KERATOCONJUNCTIVITIS SICCA (MULTI): ICD-10-CM

## 2024-04-18 RX ORDER — SEMAGLUTIDE 0.25 MG/.5ML
0.25 INJECTION, SOLUTION SUBCUTANEOUS
Qty: 2 ML | Refills: 0 | Status: SHIPPED | OUTPATIENT
Start: 2024-04-21 | End: 2024-05-29 | Stop reason: WASHOUT

## 2024-04-18 RX ORDER — OXYCODONE HYDROCHLORIDE 10 MG/1
10 TABLET ORAL EVERY 6 HOURS PRN
Qty: 105 TABLET | Refills: 0 | Status: SHIPPED | OUTPATIENT
Start: 2024-04-18 | End: 2024-05-16 | Stop reason: SDUPTHER

## 2024-04-18 RX ORDER — CYCLOBENZAPRINE HCL 10 MG
10 TABLET ORAL 2 TIMES DAILY
Qty: 60 TABLET | Refills: 0 | Status: SHIPPED | OUTPATIENT
Start: 2024-04-18

## 2024-04-18 NOTE — TELEPHONE ENCOUNTER
Pt sent a portal message asking again for status of Wegovy and what is going on with it.  Pt is wondering if there is a PA needed

## 2024-04-22 DIAGNOSIS — J45.20 MILD INTERMITTENT ASTHMA, UNSPECIFIED WHETHER COMPLICATED (HHS-HCC): ICD-10-CM

## 2024-04-22 RX ORDER — ALBUTEROL SULFATE 90 UG/1
1-2 AEROSOL, METERED RESPIRATORY (INHALATION) EVERY 4 HOURS PRN
Qty: 8 G | Refills: 1 | Status: SHIPPED | OUTPATIENT
Start: 2024-04-22 | End: 2024-04-26 | Stop reason: SDUPTHER

## 2024-04-22 NOTE — TELEPHONE ENCOUNTER
----- Message from Luis Condon Jr. sent at 4/20/2024 10:46 PM EDT -----  Regarding: Refill  Contact: [phone]  Hello. Can you please send in a refill for my Albuterol inhaler? I asked for a refill at my last appointment, but that one wasn't sent in. Thanks!

## 2024-04-23 ENCOUNTER — DOCUMENTATION (OUTPATIENT)
Dept: NEUROLOGY | Facility: HOSPITAL | Age: 44
End: 2024-04-23
Payer: MEDICARE

## 2024-04-25 ENCOUNTER — DOCUMENTATION (OUTPATIENT)
Dept: NEUROLOGY | Facility: HOSPITAL | Age: 44
End: 2024-04-25
Payer: MEDICARE

## 2024-04-26 ENCOUNTER — LAB (OUTPATIENT)
Dept: LAB | Facility: LAB | Age: 44
End: 2024-04-26
Payer: MEDICARE

## 2024-04-26 DIAGNOSIS — J45.20 MILD INTERMITTENT ASTHMA, UNSPECIFIED WHETHER COMPLICATED (HHS-HCC): ICD-10-CM

## 2024-04-26 DIAGNOSIS — M79.2 NEUROPATHIC PAIN: ICD-10-CM

## 2024-04-26 DIAGNOSIS — R73.03 PREDIABETES: ICD-10-CM

## 2024-04-26 DIAGNOSIS — E78.5 HYPERLIPIDEMIA, UNSPECIFIED HYPERLIPIDEMIA TYPE: ICD-10-CM

## 2024-04-26 LAB
ALBUMIN SERPL BCP-MCNC: 4.3 G/DL (ref 3.4–5)
ALP SERPL-CCNC: 71 U/L (ref 33–120)
ALT SERPL W P-5'-P-CCNC: 21 U/L (ref 10–52)
ANION GAP SERPL CALC-SCNC: 10 MMOL/L
AST SERPL W P-5'-P-CCNC: 23 U/L (ref 9–39)
BILIRUB SERPL-MCNC: 0.4 MG/DL (ref 0–1.2)
BUN SERPL-MCNC: 9 MG/DL (ref 6–23)
CALCIUM SERPL-MCNC: 9.1 MG/DL (ref 8.6–10.3)
CHLORIDE SERPL-SCNC: 103 MMOL/L (ref 98–107)
CHOLEST SERPL-MCNC: 198 MG/DL (ref 0–199)
CHOLESTEROL/HDL RATIO: 7.1
CO2 SERPL-SCNC: 29 MMOL/L (ref 21–32)
CREAT SERPL-MCNC: 1.21 MG/DL (ref 0.5–1.3)
EGFRCR SERPLBLD CKD-EPI 2021: 77 ML/MIN/1.73M*2
EST. AVERAGE GLUCOSE BLD GHB EST-MCNC: 114 MG/DL
GLUCOSE SERPL-MCNC: 102 MG/DL (ref 74–99)
HBA1C MFR BLD: 5.6 %
HDLC SERPL-MCNC: 27.9 MG/DL
LDLC SERPL CALC-MCNC: ABNORMAL MG/DL
NON HDL CHOLESTEROL: 170 MG/DL (ref 0–149)
POTASSIUM SERPL-SCNC: 4.3 MMOL/L (ref 3.5–5.3)
PROT SERPL-MCNC: 7.1 G/DL (ref 6.4–8.2)
PROT SERPL-MCNC: 7.3 G/DL (ref 6.4–8.2)
SODIUM SERPL-SCNC: 138 MMOL/L (ref 136–145)
TRIGL SERPL-MCNC: 593 MG/DL (ref 0–149)
VLDL: ABNORMAL

## 2024-04-26 PROCEDURE — 83036 HEMOGLOBIN GLYCOSYLATED A1C: CPT

## 2024-04-26 PROCEDURE — 36415 COLL VENOUS BLD VENIPUNCTURE: CPT

## 2024-04-26 PROCEDURE — 86334 IMMUNOFIX E-PHORESIS SERUM: CPT

## 2024-04-26 PROCEDURE — 83521 IG LIGHT CHAINS FREE EACH: CPT

## 2024-04-26 PROCEDURE — 86320 SERUM IMMUNOELECTROPHORESIS: CPT | Performed by: PSYCHIATRY & NEUROLOGY

## 2024-04-26 PROCEDURE — 80061 LIPID PANEL: CPT

## 2024-04-26 PROCEDURE — 83516 IMMUNOASSAY NONANTIBODY: CPT

## 2024-04-26 PROCEDURE — 80053 COMPREHEN METABOLIC PANEL: CPT

## 2024-04-26 PROCEDURE — 84155 ASSAY OF PROTEIN SERUM: CPT

## 2024-04-26 PROCEDURE — 84165 PROTEIN E-PHORESIS SERUM: CPT

## 2024-04-26 PROCEDURE — 84165 PROTEIN E-PHORESIS SERUM: CPT | Performed by: PSYCHIATRY & NEUROLOGY

## 2024-04-26 RX ORDER — ALBUTEROL SULFATE 90 UG/1
1-2 AEROSOL, METERED RESPIRATORY (INHALATION) EVERY 4 HOURS PRN
Qty: 8 G | Refills: 1 | Status: SHIPPED | OUTPATIENT
Start: 2024-04-26 | End: 2024-04-29 | Stop reason: SDUPTHER

## 2024-04-26 NOTE — PROGRESS NOTES
I spoke with patient, and explained to him I would like to order some other laboratory tests. Have put in orders for HMGCoA antibodies (patient has been on lipitor in the past), gene testing for TTR gene (test for hereditary amyloid, done at no cost through [facility]), and kappa/lambda ratio, SPEP/CR, and 24 hour urine protein/CR. Patient understands reason for testing, will have tests done, and we will discuss results when they become available. Patient also has appointment for Neuromuscular ultrasound of the foot.

## 2024-04-26 NOTE — TELEPHONE ENCOUNTER
----- Message from Luis Condon Jr. sent at 4/26/2024  9:28 AM EDT -----  Regarding: Albuterol inhaler Refill   Contact: [phone]  Hello.    I'm just following up on thr refill request for the Albuterol inhaler.  I saw that it was approved, but [facility] is saying they never received the prescription.  Can this refill be sent over to the pharmacy?     Thanks!

## 2024-04-28 LAB
KAPPA LC SERPL-MCNC: 1.98 MG/DL (ref 0.33–1.94)
KAPPA LC/LAMBDA SER: 1.41 {RATIO} (ref 0.26–1.65)
LAMBDA LC SERPL-MCNC: 1.4 MG/DL (ref 0.57–2.63)

## 2024-04-29 DIAGNOSIS — J45.20 MILD INTERMITTENT INTRINSIC ASTHMA WITHOUT STATUS ASTHMATICUS WITHOUT COMPLICATION (HHS-HCC): Primary | ICD-10-CM

## 2024-04-29 DIAGNOSIS — J45.20 MILD INTERMITTENT ASTHMA, UNSPECIFIED WHETHER COMPLICATED (HHS-HCC): ICD-10-CM

## 2024-04-29 RX ORDER — ALBUTEROL SULFATE 90 UG/1
1-2 AEROSOL, METERED RESPIRATORY (INHALATION) EVERY 4 HOURS PRN
Qty: 8 G | Refills: 1 | Status: SHIPPED | OUTPATIENT
Start: 2024-04-29 | End: 2024-04-29 | Stop reason: WASHOUT

## 2024-04-29 RX ORDER — ALBUTEROL SULFATE 90 UG/1
2 AEROSOL, METERED RESPIRATORY (INHALATION) EVERY 4 HOURS PRN
Qty: 8.5 G | Refills: 1 | Status: SHIPPED | OUTPATIENT
Start: 2024-04-29 | End: 2025-04-29

## 2024-04-30 ENCOUNTER — LAB (OUTPATIENT)
Dept: LAB | Facility: LAB | Age: 44
End: 2024-04-30
Payer: MEDICARE

## 2024-04-30 DIAGNOSIS — M79.2 NEUROPATHIC PAIN: ICD-10-CM

## 2024-04-30 LAB
COLLECT DURATION TIME SPEC: 24 HRS
COLLECT DURATION TIME SPEC: 24 HRS
CREAT 24H UR-MCNC: 29.7 MG/DL (ref 20–370)
CREAT 24H UR-MRATE: 0.88 G/24 H (ref 0.87–2.41)
HOLD SPECIMEN: NORMAL
PROT 24H UR-MCNC: <4 MG/DL (ref 5–25)
PROT 24H UR-MCNC: <4 MG/DL (ref 5–25)
PROT 24H UR-MRATE: ABNORMAL G/(24.H)
SPECIMEN VOL 24H UR: 2950 ML
SPECIMEN VOL 24H UR: 2950 ML
TOTAL PROTEIN (MG/24HR) IN 24 HOUR URINE UPE: <118 MG/24H

## 2024-04-30 PROCEDURE — 86325 OTHER IMMUNOELECTROPHORESIS: CPT | Performed by: PSYCHIATRY & NEUROLOGY

## 2024-04-30 PROCEDURE — 81050 URINALYSIS VOLUME MEASURE: CPT

## 2024-04-30 PROCEDURE — 86335 IMMUNFIX E-PHORSIS/URINE/CSF: CPT

## 2024-04-30 PROCEDURE — 84166 PROTEIN E-PHORESIS/URINE/CSF: CPT

## 2024-04-30 PROCEDURE — 84156 ASSAY OF PROTEIN URINE: CPT

## 2024-04-30 PROCEDURE — 84166 PROTEIN E-PHORESIS/URINE/CSF: CPT | Performed by: PSYCHIATRY & NEUROLOGY

## 2024-04-30 PROCEDURE — 82570 ASSAY OF URINE CREATININE: CPT

## 2024-05-02 ENCOUNTER — CLINICAL SUPPORT (OUTPATIENT)
Dept: ALLERGY | Facility: CLINIC | Age: 44
End: 2024-05-02
Payer: MEDICARE

## 2024-05-02 LAB
ALBUMIN: 4.4 G/DL (ref 3.4–5)
ALPHA 1 GLOBULIN: 0.2 G/DL (ref 0.2–0.6)
ALPHA 2 GLOBULIN: 0.5 G/DL (ref 0.4–1.1)
BETA GLOBULIN: 0.7 G/DL (ref 0.5–1.2)
GAMMA GLOBULIN: 1.4 G/DL (ref 0.5–1.4)
IMMUNOFIXATION COMMENT: NORMAL
PATH REVIEW - SERUM IMMUNOFIXATION: NORMAL
PATH REVIEW-SERUM PROTEIN ELECTROPHORESIS: NORMAL
PROTEIN ELECTROPHORESIS COMMENT: NORMAL

## 2024-05-03 LAB — HMGCR IGG SER IA-ACNC: <3 UNITS (ref 0–19)

## 2024-05-04 LAB
ALBUMIN MFR UR ELPH: 35.4 %
ALPHA1 GLOB MFR UR ELPH: 12.5 %
ALPHA2 GLOB MFR UR ELPH: 11.2 %
B-GLOBULIN MFR UR ELPH: 13.7 %
GAMMA GLOB MFR UR ELPH: 27.2 %
IMMUNOFIXATION COMMENT: NORMAL
PATH REVIEW - URINE IMMUNOFIXATION: NORMAL
PATH REVIEW-URINE PROTEIN ELECTROPHORESIS: NORMAL
URINE ELECTROPHORESIS COMMENT: NORMAL

## 2024-05-16 DIAGNOSIS — M35.06 SJOGREN'S SYNDROME WITH PERIPHERAL NERVOUS SYSTEM INVOLVEMENT (MULTI): ICD-10-CM

## 2024-05-16 RX ORDER — OXYCODONE HYDROCHLORIDE 10 MG/1
10 TABLET ORAL EVERY 6 HOURS PRN
Qty: 105 TABLET | Refills: 0 | Status: SHIPPED | OUTPATIENT
Start: 2024-05-16

## 2024-05-28 RX ORDER — CLONIDINE HYDROCHLORIDE 0.1 MG/1
0.1 TABLET ORAL 3 TIMES DAILY
COMMUNITY
Start: 2024-05-16

## 2024-05-28 RX ORDER — HYDROXYZINE PAMOATE 25 MG/1
CAPSULE ORAL
COMMUNITY
Start: 2024-05-09

## 2024-05-29 ENCOUNTER — OFFICE VISIT (OUTPATIENT)
Dept: CARDIOLOGY | Facility: CLINIC | Age: 44
End: 2024-05-29
Payer: MEDICARE

## 2024-05-29 VITALS
HEART RATE: 57 BPM | OXYGEN SATURATION: 98 % | DIASTOLIC BLOOD PRESSURE: 70 MMHG | SYSTOLIC BLOOD PRESSURE: 132 MMHG | HEIGHT: 72 IN | BODY MASS INDEX: 30.92 KG/M2 | WEIGHT: 228.3 LBS

## 2024-05-29 DIAGNOSIS — I10 ESSENTIAL HYPERTENSION: ICD-10-CM

## 2024-05-29 DIAGNOSIS — R06.02 SHORTNESS OF BREATH: ICD-10-CM

## 2024-05-29 DIAGNOSIS — E78.2 MIXED HYPERLIPIDEMIA: ICD-10-CM

## 2024-05-29 DIAGNOSIS — R07.9 CHEST PAIN, UNSPECIFIED TYPE: Primary | ICD-10-CM

## 2024-05-29 PROCEDURE — 3008F BODY MASS INDEX DOCD: CPT | Performed by: INTERNAL MEDICINE

## 2024-05-29 PROCEDURE — 93005 ELECTROCARDIOGRAM TRACING: CPT | Performed by: INTERNAL MEDICINE

## 2024-05-29 PROCEDURE — 99204 OFFICE O/P NEW MOD 45 MIN: CPT | Performed by: INTERNAL MEDICINE

## 2024-05-29 PROCEDURE — 3075F SYST BP GE 130 - 139MM HG: CPT | Performed by: INTERNAL MEDICINE

## 2024-05-29 PROCEDURE — 99214 OFFICE O/P EST MOD 30 MIN: CPT | Performed by: INTERNAL MEDICINE

## 2024-05-29 PROCEDURE — 1036F TOBACCO NON-USER: CPT | Performed by: INTERNAL MEDICINE

## 2024-05-29 PROCEDURE — 3078F DIAST BP <80 MM HG: CPT | Performed by: INTERNAL MEDICINE

## 2024-05-29 ASSESSMENT — PAIN SCALES - GENERAL: PAINLEVEL: 0-NO PAIN

## 2024-05-29 ASSESSMENT — ENCOUNTER SYMPTOMS
ALLERGIC/IMMUNOLOGIC NEGATIVE: 1
OCCASIONAL FEELINGS OF UNSTEADINESS: 0
CARDIOVASCULAR NEGATIVE: 1
NEUROLOGICAL NEGATIVE: 1
EYES NEGATIVE: 1
PSYCHIATRIC NEGATIVE: 1
GASTROINTESTINAL NEGATIVE: 1
CONSTITUTIONAL NEGATIVE: 1
DEPRESSION: 1
LOSS OF SENSATION IN FEET: 1
RESPIRATORY NEGATIVE: 1
ENDOCRINE NEGATIVE: 1
MUSCULOSKELETAL NEGATIVE: 1
HEMATOLOGIC/LYMPHATIC NEGATIVE: 1

## 2024-05-29 ASSESSMENT — COLUMBIA-SUICIDE SEVERITY RATING SCALE - C-SSRS
6. HAVE YOU EVER DONE ANYTHING, STARTED TO DO ANYTHING, OR PREPARED TO DO ANYTHING TO END YOUR LIFE?: NO
1. IN THE PAST MONTH, HAVE YOU WISHED YOU WERE DEAD OR WISHED YOU COULD GO TO SLEEP AND NOT WAKE UP?: NO
2. HAVE YOU ACTUALLY HAD ANY THOUGHTS OF KILLING YOURSELF?: NO

## 2024-05-29 ASSESSMENT — PATIENT HEALTH QUESTIONNAIRE - PHQ9
SUM OF ALL RESPONSES TO PHQ9 QUESTIONS 1 AND 2: 0
2. FEELING DOWN, DEPRESSED OR HOPELESS: NOT AT ALL
1. LITTLE INTEREST OR PLEASURE IN DOING THINGS: NOT AT ALL

## 2024-05-29 NOTE — PROGRESS NOTES
Preventive Cardiology Clinic Note    Reason for Visit: Chest pain and shortness of breath  Referring Clinician: Ralph     History of Present Illness: Luis Condon Jr. is a 43 y.o. male with history of hypertension and mixed dyslipidemia who is referred for chest pain and shortness of breath.  He describes several years of episodic chest pain described as a pinch or a poke associated with electrical feeling down his chest and his left arm.  The chest pain occurs on the left side of the chest and can be both at rest and with exertion.  It is not associated with shortness of breath, palpitations, or syncope.  He also has exertional dyspnea and paroxysmal nocturnal dyspnea and is recent diagnosed with mild obstructive sleep apnea.  He does not have any orthopnea or lower extremity swelling.  He has seen a cardiologist several years ago and was told the testing was normal.  He is a non-smoker.  He does not have a family history of cardiovascular disease.  He has a prior CT scan that did not show any coronary calcifications as incidental finding.  He recently had a workup for amyloidosis which was negative including no M protein and negative genetic testing.    Past Medical History:  He has a past medical history of Disorder of muscle, unspecified (04/10/2018), Disorder of prostate, unspecified, Hiccough (02/19/2020), Male erectile dysfunction, unspecified (02/26/2013), Other instability, unspecified ankle (11/09/2019), Other meniscus derangements, unspecified medial meniscus, unspecified knee (08/17/2018), Personal history of diseases of the skin and subcutaneous tissue (09/14/2019), Personal history of other diseases of the circulatory system, Personal history of other diseases of the nervous system and sense organs, Personal history of other diseases of the respiratory system, Personal history of other diseases of urinary system, Personal history of other endocrine, nutritional and metabolic disease, Personal  history of other endocrine, nutritional and metabolic disease (2016), Personal history of other mental and behavioral disorders, Pure hypercholesterolemia, unspecified, Sjogren syndrome, unspecified (Multi) (2015), Sleep apnea, unspecified (2015), and Snoring.    Past Surgical History:  He has a past surgical history that includes Knee surgery (2016); Tonsillectomy (2017); and Tonsillectomy (2015).    Social History:  He reports that he has never smoked. He has never used smokeless tobacco.    Family History:  Family History   Problem Relation Name Age of Onset    Other (Cerebrovascular accident) Mother      Other (Diabetes mellitus) Mother      Hypertension Mother      Heart attack Mother      Coronary artery disease Mother          CAD- 42    Asthma Father      Other (Diabetes mellitus) Father      Hypertension Father      Crohn's disease Brother          x 2    Stroke Maternal Grandfather      Stroke Paternal Grandmother      Lung cancer Paternal Grandfather      Asthma Other Grandmother     Hypertension Other Grandmother        Allergies:  Sulfa (sulfonamide antibiotics), Bee pollen, Cat dander, Grass pollen, House dust, and Other    Outpatient Medications:  Current Outpatient Medications   Medication Instructions    albuterol (ProAir HFA) 90 mcg/actuation inhaler 2 puffs, inhalation, Every 4 hours PRN    albuterol 2.5 mg, inhalation, Every 6 hours PRN    ALPRAZolam (Xanax) 2 mg tablet 1 tablet, oral, 4 times daily PRN    azelastine (Astelin) 137 mcg (0.1 %) nasal spray Use in each nostril as directed    buPROPion (Wellbutrin) 75 mg tablet 2 tablets, oral, 2 times daily    cetirizine (ZyrTEC) 10 mg tablet No dose, route, or frequency recorded.    cloNIDine (CATAPRES) 0.1 mg, oral, 3 times daily    cyclobenzaprine (FLEXERIL) 10 mg, oral, 2 times daily    EPINEPHrine 0.3 mg/0.3 mL injection syringe 0.3 mL, injection, As directed    fluticasone (Flonase) 50 mcg/actuation  nasal spray Shake gently. Before first use, prime pump. After use, clean tip and replace cap.    fluticasone (Flovent) 220 mcg/actuation inhaler 2 puffs, inhalation, 2 times daily, Rinse mouth after use    gabapentin (Neurontin) 100 mg capsule 1 capsule, oral, 3 times daily    gabapentin (Neurontin) 600 mg tablet TAKE 3 TABLETS BY MOUTH 3 TIMES DAILY AS DIRECTED    hydrOXYzine HCL (Atarax) 25 mg tablet 2 tablets, oral, Nightly    hydrOXYzine pamoate (Vistaril) 25 mg capsule TAKE 3-4 CAPSULES BY MOUTH AT BEDTIME AS NEEDED    icosapent ethyL (VASCEPA) 2 g, oral, 2 times daily (morning and late afternoon)    lisinopril 20 mg, oral, Daily    lubiprostone (AMITIZA) 24 mcg, oral, 2 times daily, With food    meloxicam (MOBIC) 15 mg, oral, Daily PRN    methylPREDNISolone (Medrol Dospak) 4 mg tablets Take as directed    mirtazapine (Remeron) 30 mg tablet 2 tablets, oral, Nightly    multivitamin tablet 1 tablet, oral, Daily    naloxone (NARCAN) 4 mg, nasal, 1 actuation in one nostril x1, may repeat dose q2-3min until pt responsive or EMS arrives    omeprazole (PRILOSEC) 40 mg, oral, Daily before breakfast, Do not crush or chew.    oxyCODONE (ROXICODONE) 10 mg, oral, Every 6 hours PRN    polyethylene glycol (GLYCOLAX, MIRALAX) 17 g, oral, Daily, X 2 weeks, then as needed    propranolol (Inderal) 10 mg tablet 1 tablet, oral, 2 times daily PRN    prucalopride (Motegrity) 2 mg tablet 1 tablet, oral, Daily    TURMERIC ORAL Turmeric 500 MG Oral Capsule    ZINC ORAL Zinc 100 MG Oral Tablet       Review of Systems:  Review of Systems   Constitutional: Negative.   HENT: Negative.     Eyes: Negative.    Cardiovascular: Negative.    Respiratory: Negative.     Endocrine: Negative.    Hematologic/Lymphatic: Negative.    Skin: Negative.    Musculoskeletal: Negative.    Gastrointestinal: Negative.    Genitourinary: Negative.    Neurological: Negative.    Psychiatric/Behavioral: Negative.     Allergic/Immunologic: Negative.        Last  Recorded Vitals:  Vitals:    05/29/24 1500   BP: 132/70   BP Location: Left arm   Patient Position: Sitting   BP Cuff Size: Large adult   Pulse: 57   SpO2: 98%   Weight: 104 kg (228 lb 4.8 oz)   Height: 1.829 m (6')       Physical Examination:  General: Well appearing, well-nourished, in no acute distress.  HEENT: Normocephalic atraumatic, pupils equal and reactive to light, extraocular muscles intact, no conjunctival injection, oropharynx clear without exudates.  Neck: Normal carotid arterial pulses, no arterial bruits, no thyromegaly.  Cardiac: Regular rhythm and normal heart rate.  S1, S2 present and normal.  No murmurs, rubs, or gallops.  PMI is nondisplaced. Jugular venous pulsations are normal.  Pulmonary: Normal breath sounds, no increased work of breathing, no wheezes or crackles.  GI: Normal bowel sounds, abdominal aorta not enlarged, no hepatosplenomegaly, no abdominal bruits.  Lower extremities: No cyanosis, clubbing, or edema.  No xanthelasma present. Normal distal pulses.  Skin: Skin intact. No significant rashes or lesions present.  Neuro: Alert and oriented x 3, normal attention and cognition, no focal motor or sensory neurologic deficits.  Psych: Normal affect and mood.  Musculoskeletal: Normal gait normal muscle tone.    Laboratory Studies:  Lab Results   Component Value Date    GLUCOSE 102 (H) 04/26/2024    CALCIUM 9.1 04/26/2024     04/26/2024    K 4.3 04/26/2024    CO2 29 04/26/2024     04/26/2024    BUN 9 04/26/2024    CREATININE 1.21 04/26/2024     Lab Results   Component Value Date    ALT 21 04/26/2024    AST 23 04/26/2024    ALKPHOS 71 04/26/2024    BILITOT 0.4 04/26/2024         Lab Results   Component Value Date    CHOL 198 04/26/2024    CHOL 250 (H) 11/30/2023    CHOL 200 (H) 09/01/2023     Lab Results   Component Value Date    HDL 27.9 04/26/2024    HDL 37.0 11/30/2023    HDL 29.5 (A) 09/01/2023     Lab Results   Component Value Date    LDLCALC  04/26/2024      Comment:       "The calculation of LDL and VLDL are inaccurate when the Triglycerides are greater than 400 mg/dL or when the patient is non-fasting. If LDL measurement is necessary contact the testing laboratory for an alternative LDL assay.                                  Near   Borderline      AGE      Desirable  Optimal    High     High     Very High     0-19 Y     0 - 109     ---    110-129   >/= 130     ----    20-24 Y     0 - 119     ---    120-159   >/= 160     ----      >24 Y     0 -  99   100-129  130-159   160-189     >/=190      LDLCALC 134 (H) 11/30/2023     Lab Results   Component Value Date    TRIG 593 (H) 04/26/2024    TRIG 396 (H) 11/30/2023    TRIG 289 (H) 09/01/2023     No components found for: \"CHOLHDL\"  Lab Results   Component Value Date    HGBA1C 5.6 04/26/2024     No components found for: \"UACR\"    Cardiology Tests:  ECG:  ECG 12 lead (Clinic Performed) 05/29/2024  ECG in the office today shows sinus bradycardia, ventricular rate 53 bpm, normal intervals and axis, and otherwise normal ECG. No evidence for left ventricular hypertrophy.    Assessment and Plan:  Problem List Items Addressed This Visit          Cardiac and Vasculature    Hyperlipidemia    Essential hypertension     Other Visit Diagnoses       Chest pain, unspecified type    -  Primary    Relevant Orders    ECG 12 Lead    Transthoracic Echo Complete    CT cardiac scoring wo IV contrast    Stress Test    Shortness of breath        Relevant Orders    Transthoracic Echo Complete    CT cardiac scoring wo IV contrast    Stress Test          Luis Condon Jr. is a 43 y.o. male with history of hypertension and mixed dyslipidemia who is referred for chest pain and shortness of breath.  His symptoms are atypical for cardiovascular etiology although he does have several risk factors and increases risk for coronary artery disease or heart failure.  Therefore further evaluation is warranted with an exercise treadmill stress test, an echocardiogram, and a " coronary artery calcium score.  He does have mixed dyslipidemia he was recently started on icosapent ethyl by his primary care physician.  His blood pressure is essentially at goal today on his current therapy of an ACE inhibitor. Once these tests are completed I will contact him with results and further recommendations.  Please do not hesitate to call or contact me with any questions or concerns.    Caio Aguilar MD, FADU, FACC  Director,  [facility]  Director,  [facility]  Associate Professor of Medicine  [employer]

## 2024-05-30 ENCOUNTER — APPOINTMENT (OUTPATIENT)
Dept: ALLERGY | Facility: CLINIC | Age: 44
End: 2024-05-30
Payer: MEDICARE

## 2024-05-30 ENCOUNTER — OFFICE VISIT (OUTPATIENT)
Dept: RHEUMATOLOGY | Facility: CLINIC | Age: 44
End: 2024-05-30
Payer: MEDICARE

## 2024-05-30 VITALS
HEIGHT: 72 IN | BODY MASS INDEX: 30.94 KG/M2 | HEART RATE: 55 BPM | SYSTOLIC BLOOD PRESSURE: 144 MMHG | OXYGEN SATURATION: 97 % | DIASTOLIC BLOOD PRESSURE: 83 MMHG | WEIGHT: 228.4 LBS

## 2024-05-30 DIAGNOSIS — J30.89 ALLERGIC RHINITIS DUE TO OTHER ALLERGIC TRIGGER, UNSPECIFIED SEASONALITY: Primary | ICD-10-CM

## 2024-05-30 DIAGNOSIS — M77.02 GOLFER'S ELBOW, LEFT: ICD-10-CM

## 2024-05-30 DIAGNOSIS — Z79.891 ENCOUNTER FOR LONG-TERM OPIATE ANALGESIC USE: ICD-10-CM

## 2024-05-30 DIAGNOSIS — M35.01 SJOGREN'S SYNDROME WITH KERATOCONJUNCTIVITIS SICCA (MULTI): Primary | ICD-10-CM

## 2024-05-30 PROCEDURE — 3008F BODY MASS INDEX DOCD: CPT | Performed by: ALLERGY & IMMUNOLOGY

## 2024-05-30 PROCEDURE — 99213 OFFICE O/P EST LOW 20 MIN: CPT | Performed by: INTERNAL MEDICINE

## 2024-05-30 PROCEDURE — 3008F BODY MASS INDEX DOCD: CPT | Performed by: INTERNAL MEDICINE

## 2024-05-30 PROCEDURE — 3077F SYST BP >= 140 MM HG: CPT | Performed by: INTERNAL MEDICINE

## 2024-05-30 PROCEDURE — 95117 IMMUNOTHERAPY INJECTIONS: CPT | Performed by: ALLERGY & IMMUNOLOGY

## 2024-05-30 PROCEDURE — 3079F DIAST BP 80-89 MM HG: CPT | Performed by: INTERNAL MEDICINE

## 2024-05-30 ASSESSMENT — ENCOUNTER SYMPTOMS
BACK PAIN: 1
MYALGIAS: 1
NECK PAIN: 1
FATIGUE: 1
JOINT SWELLING: 1
NECK STIFFNESS: 1
CONSTIPATION: 1
ARTHRALGIAS: 1
DIARRHEA: 1

## 2024-05-30 NOTE — PROGRESS NOTES
Subjective   Patient ID: Luis Condon Jr. is a 43 y.o. male who presents for Follow-up (3 mo fuv; UDS and CSA are UTD).  HPI  Patient with Sjogren's with positive STEPHANY and SSB with sicca symptoms, elevated CPK with normal muscle biopsy, episcleritis, small fiber neuropathy on skin biopsy to review.  Previous medications have included azathioprine, CellCept, methotrexate, Cytoxan, IVIG, Rituxan, high-dose steroids.    He was seen last February.    In the interim he has been seen by Neurology had some further test.  He did have some elevation of CPK which had been normal for the last few years.  His neurologist varies blood work and has been negative for HMGCoA antibodies amyloid test.    He has been having a lot of issues with carpal tunnel and is going to go back to Dr. Marx to get injections for this.  His right hand is worse than his left.    He has been having issues with the left elbow.    He is going to have a ultrasound guided EMG of the left ankle since he gets pins and needle symptoms in it.    His eyes are very dry couple sensations.  Has not seen his eye provider in some time    Had to give up his dog as his dog did bite him twice.  He does realize how much the dog was helping him with his depression.  Review of Systems   Constitutional:  Positive for fatigue.   Eyes:         Dry eye   Gastrointestinal:  Positive for constipation and diarrhea.   Musculoskeletal:  Positive for arthralgias, back pain, joint swelling, myalgias, neck pain and neck stiffness.   Psychiatric/Behavioral:          Depression       Objective   Physical Exam  GEN: NAD A&O x3 appropriate affect   EYES:  Wearing a brace on his right wrist   No current swelling in the hands elbows mild guarding with range of motion of the right shoulder has a brace on the right foot no swelling in the knees  No rashes seen on exposed skin   Tenderness in the left medial epicondyles  Assessment/Plan     Patient with +STEPHANY +SSB with sicca symptoms  elevated cpks with normal muscle biopsy, episcleritis. Patient says he has been diagnosed with small fiber neuropathy seen on skin biopsy. Review of biopsy report does not fully support small fiber neuropathy but does comment that the epidermal nerve fiber densities are in the lower normal range with some axonal swelling at the distal leg. Has been evaluated by Dr. Yan (neurology) who feels that his symptoms are most likely due to small fiber neuropathy .    -He did have a recent blood test that showed negative HMGCoA antibodies and negative amyloid panel.   -Increase his and his gabapentin have been unhelpful     Sjogren's.- Has been on azathioprine, CellCept, methotrexate, Cytoxan, IVIG, Rituxan without any help in his symptoms. He had high dose steroids and had side effects to this. We will do blood work but uncertain what else we can do for his symptoms. We have retried methotrexate and azathioprine. Patient is wondering is there anything else that we can try and we had tried several options including Rituxan and IVIG. I do not feel that this is worth retrying again.      He has had some issues obtaining his oxycodone.  Currently on combination with gabapentin, alprazolam, baclofen,.  Patient understands risk of increases sedation.  I am hesitant about increasing his medicines any further since he is on a combination of medications that can cause increase in sedation.  Has been seen by pain management in the past and has had ketamine infusions.    Left medial epicondylitis.  Discussed conservative measures     In 3 months or as needed.     OARRS:  Christina Ely MD on 5/30/2024 10:39 AM  I have personally reviewed the OARRS report for Luis Condon Jr.. I have considered the risks of abuse, dependence, addiction and diversion    Is the patient prescribed a combination of a benzodiazepine and opioid?  Yes, I feel it is clincially indicated to continue the medication and have discussed with the patient  risks/benefits/alternatives.    Last Urine Drug Screen / ordered today: Yes  Recent Results (from the past 8760 hour(s))   Opiate Confirmation, Urine    Collection Time: 06/23/23  2:00 PM   Result Value Ref Range    6-Acetylmorphine <25 Cutoff <25 ng/mL    Codeine <50 Cutoff <50 ng/mL    Hydrocodone <25 Cutoff <25 ng/mL    Hydromorphone <25 Cutoff <25 ng/mL    Morphine Urine <50 Cutoff <50 ng/mL    Norhydrocodone <25 Cutoff <25 ng/mL    Noroxycodone >1000 (A) Cutoff <25 ng/mL    Oxycodone 1,533 (A) Cutoff <25 ng/mL    Oxymorphone 763 (A) Cutoff <25 ng/mL   Drug Screen, Urine With Reflex to Confirmation    Collection Time: 06/23/23  9:33 AM   Result Value Ref Range    DRUG SCREEN COMMENT URINE SEE BELOW     Amphetamine Screen, Urine PRESUMPTIVE NEGATIVE NEGATIVE    Barbiturate Screen, Urine PRESUMPTIVE NEGATIVE NEGATIVE    BENZODIAZEPINE (PRESENCE) IN URINE BY SCREEN METHOD PRESUMPTIVE NEGATIVE NEGATIVE    Cannabinoid Screen, Urine PRESUMPTIVE NEGATIVE NEGATIVE    Cocaine Screen, Urine PRESUMPTIVE NEGATIVE NEGATIVE    Fentanyl, Ur PRESUMPTIVE NEGATIVE NEGATIVE    Methadone Screen, Urine PRESUMPTIVE NEGATIVE NEGATIVE    Opiate Screen, Urine PRESUMPTIVE NEGATIVE NEGATIVE    Oxycodone Screen, Ur PRESUMPTIVE POSITIVE (A) NEGATIVE    PCP Screen, Urine PRESUMPTIVE NEGATIVE NEGATIVE     Results are as expected.         Controlled Substance Agreement:  Date of the Last Agreement: 02/29/24  Reviewed Controlled Substance Agreement including but not limited to the benefits, risks, and alternatives to treatment with a Controlled Substance medication(s).    Opioids:  What is the patient's goal of therapy?help with pain  Is this being achieved with current treatment? some    I have calculated the patient's Morphine Dose Equivalent (MED):     I feel that it is clinically indicated to continue this current medication regimen after consideration of alternative therapies, and other non-opioid treatment.    Opioid Risk Screening:  No  data recorded    Pain Assessment:  No data recorded  Christina Ely MD 05/30/24 10:39 AM

## 2024-06-02 DIAGNOSIS — R73.03 PREDIABETES: ICD-10-CM

## 2024-06-02 DIAGNOSIS — I10 ESSENTIAL HYPERTENSION: ICD-10-CM

## 2024-06-03 RX ORDER — LISINOPRIL 20 MG/1
20 TABLET ORAL DAILY
Qty: 90 TABLET | Refills: 1 | OUTPATIENT
Start: 2024-06-03

## 2024-06-08 NOTE — PROGRESS NOTES
See Allergy Shot Immunotherapy Record Book  Allergy checklist done, no concerns                  Subjective   Patient ID:   [id number]   Luis Condon Jr. is a 43 y.o. male who presents for No chief complaint on file..    No chief complaint on file.         HPI  This patient is here to evaluate for:      Review of Systems      Objective     There were no vitals taken for this visit.     Physical Exam       Current Outpatient Medications   Medication Sig Dispense Refill    albuterol (ProAir HFA) 90 mcg/actuation inhaler Inhale 2 puffs every 4 hours if needed for wheezing or shortness of breath. 8.5 g 1    albuterol 2.5 mg /3 mL (0.083 %) nebulizer solution Inhale 3 mL (2.5 mg) every 6 hours if needed.      ALPRAZolam (Xanax) 2 mg tablet Take 1 tablet (2 mg) by mouth 4 times a day as needed.      azelastine (Astelin) 137 mcg (0.1 %) nasal spray Use in each nostril as directed      buPROPion (Wellbutrin) 75 mg tablet Take 2 tablets (150 mg) by mouth 2 times a day.      cetirizine (ZyrTEC) 10 mg tablet       cloNIDine (Catapres) 0.1 mg tablet Take 1 tablet (0.1 mg) by mouth 3 times a day.      cyclobenzaprine (Flexeril) 10 mg tablet TAKE 1 TABLET BY MOUTH TWICE A DAY 60 tablet 0    EPINEPHrine 0.3 mg/0.3 mL injection syringe Inject 0.3 mL (0.3 mg) as directed. As directed      fluticasone (Flonase) 50 mcg/actuation nasal spray Shake gently. Before first use, prime pump. After use, clean tip and replace cap.      fluticasone (Flovent) 220 mcg/actuation inhaler Inhale 2 puffs 2 times a day. Rinse mouth after use      gabapentin (Neurontin) 100 mg capsule Take 1 capsule (100 mg) by mouth 3 times a day.      gabapentin (Neurontin) 600 mg tablet TAKE 3 TABLETS BY MOUTH 3 TIMES DAILY AS DIRECTED 810 tablet 1    hydrOXYzine HCL (Atarax) 25 mg tablet Take 2 tablets (50 mg) by mouth once daily at bedtime.      hydrOXYzine pamoate (Vistaril) 25 mg capsule TAKE 3-4 CAPSULES BY MOUTH AT BEDTIME AS NEEDED      icosapent ethyL  (Vascepa) 1 gram capsule Take 2 capsules (2 g) by mouth 2 times a day with meals. 120 capsule 2    lisinopril 20 mg tablet Take 1 tablet (20 mg) by mouth once daily. 90 tablet 1    lubiprostone (Amitiza) 24 mcg capsule Take 1 capsule (24 mcg) by mouth 2 times a day. With food 180 capsule 1    meloxicam (Mobic) 15 mg tablet TAKE 1 TABLET BY MOUTH EVERY DAY AS NEEDED 30 tablet 5    methylPREDNISolone (Medrol Dospak) 4 mg tablets Take as directed      mirtazapine (Remeron) 30 mg tablet Take 2 tablets (60 mg) by mouth once daily at bedtime.      multivitamin tablet Take 1 tablet by mouth once daily.      naloxone (Narcan) 4 mg/0.1 mL nasal spray Administer 1 spray (4 mg) into affected nostril(s). 1 actuation in one nostril x1, may repeat dose q2-3min until pt responsive or EMS arrives      omeprazole (PriLOSEC) 40 mg DR capsule Take 1 capsule (40 mg) by mouth once daily in the morning. Take before meals. Do not crush or chew. 90 capsule 1    oxyCODONE (Roxicodone) 10 mg immediate release tablet Take 1 tablet (10 mg) by mouth every 6 hours if needed for severe pain (7 - 10). 105 tablet 0    polyethylene glycol (Glycolax) 17 gram/dose powder Take 17 g by mouth once daily. X 2 weeks, then as needed      propranolol (Inderal) 10 mg tablet Take 1 tablet (10 mg) by mouth 2 times a day as needed (anxiety).      prucalopride (Motegrity) 2 mg tablet Take 1 tablet (2 mg) by mouth once daily.      TURMERIC ORAL Turmeric 500 MG Oral Capsule      ZINC ORAL Zinc 100 MG Oral Tablet       No current facility-administered medications for this visit.       Summary of the labs over the past 6 months:    Lab on 02/29/2024   Component Date Value Ref Range Status    Total Protein, Urine Random 02/29/2024 <4 (L)  5 - 25 mg/dL Final    Creatinine, Urine Random 02/29/2024 46.4  20.0 - 370.0 mg/dL Final    T. Protein/Creatinine Ratio 02/29/2024    Final    C-Reactive Protein 02/29/2024 <0.10  <1.00 mg/dL Final    Sedimentation Rate 02/29/2024 <1   0 - 15 mm/h Final    Creatine Kinase 02/29/2024 690 (H)  0 - 325 U/L Final    Color, Urine 02/29/2024 Straw  Straw, Yellow Final    Appearance, Urine 02/29/2024 Clear  Clear Final    Specific Gravity, Urine 02/29/2024 1.004 (N)  1.005 - 1.035 Final    pH, Urine 02/29/2024 7.0  5.0, 5.5, 6.0, 6.5, 7.0, 7.5, 8.0 Final    Protein, Urine 02/29/2024 NEGATIVE  NEGATIVE mg/dL Final    Glucose, Urine 02/29/2024 NEGATIVE  NEGATIVE mg/dL Final    Blood, Urine 02/29/2024 NEGATIVE  NEGATIVE Final    Ketones, Urine 02/29/2024 NEGATIVE  NEGATIVE mg/dL Final    Bilirubin, Urine 02/29/2024 NEGATIVE  NEGATIVE Final    Urobilinogen, Urine 02/29/2024 <2.0  <2.0 mg/dL Final    Nitrite, Urine 02/29/2024 NEGATIVE  NEGATIVE Final    Leukocyte Esterase, Urine 02/29/2024 NEGATIVE  NEGATIVE Final    Extra Tube 02/29/2024 Hold for add-ons.   Final   Lab on 11/30/2023   Component Date Value Ref Range Status    Cholesterol 11/30/2023 250 (H)  0 - 199 mg/dL Final    HDL-Cholesterol 11/30/2023 37.0  mg/dL Final    Cholesterol/HDL Ratio 11/30/2023 6.8   Final    LDL Calculated 11/30/2023 134 (H)  <=99 mg/dL Final    VLDL 11/30/2023 79 (H)  0 - 40 mg/dL Final    Triglycerides 11/30/2023 396 (H)  0 - 149 mg/dL Final    Non HDL Cholesterol 11/30/2023 213 (H)  0 - 149 mg/dL Final    Glucose 11/30/2023 100 (H)  74 - 99 mg/dL Final    Sodium 11/30/2023 138  136 - 145 mmol/L Final    Potassium 11/30/2023 4.6  3.5 - 5.3 mmol/L Final    Chloride 11/30/2023 103  98 - 107 mmol/L Final    Bicarbonate 11/30/2023 28  21 - 32 mmol/L Final    Anion Gap 11/30/2023 12  10 - 20 mmol/L Final    Urea Nitrogen 11/30/2023 5 (L)  6 - 23 mg/dL Final    Creatinine 11/30/2023 1.03  0.50 - 1.30 mg/dL Final    eGFR 11/30/2023 >90  >60 mL/min/1.73m*2 Final    Calcium 11/30/2023 9.5  8.6 - 10.3 mg/dL Final    Albumin 11/30/2023 4.5  3.4 - 5.0 g/dL Final    Alkaline Phosphatase 11/30/2023 59  33 - 120 U/L Final    Total Protein 11/30/2023 7.9  6.4 - 8.2 g/dL Final    AST  11/30/2023 27  9 - 39 U/L Final    Bilirubin, Total 11/30/2023 0.6  0.0 - 1.2 mg/dL Final    ALT 11/30/2023 35  10 - 52 U/L Final    Hemoglobin A1C 11/30/2023 5.8 (H)  see below % Final    Estimated Average Glucose 11/30/2023 120  Not Established mg/dL Final    Thyroid Stimulating Hormone 11/30/2023 1.14  0.44 - 3.98 mIU/L Final         Assessment/Plan           Deeajy Garcia MD

## 2024-06-13 DIAGNOSIS — M35.06 SJOGREN'S SYNDROME WITH PERIPHERAL NERVOUS SYSTEM INVOLVEMENT (MULTI): ICD-10-CM

## 2024-06-13 RX ORDER — OXYCODONE HYDROCHLORIDE 10 MG/1
10 TABLET ORAL EVERY 6 HOURS PRN
Qty: 105 TABLET | Refills: 0 | Status: SHIPPED | OUTPATIENT
Start: 2024-06-13

## 2024-06-14 LAB
ATRIAL RATE: 53 BPM
P AXIS: 53 DEGREES
P OFFSET: 191 MS
P ONSET: 138 MS
PR INTERVAL: 164 MS
Q ONSET: 220 MS
QRS COUNT: 9 BEATS
QRS DURATION: 86 MS
QT INTERVAL: 394 MS
QTC CALCULATION(BAZETT): 369 MS
QTC FREDERICIA: 378 MS
R AXIS: 55 DEGREES
T AXIS: 35 DEGREES
T OFFSET: 417 MS
VENTRICULAR RATE: 53 BPM

## 2024-06-17 ENCOUNTER — HOSPITAL ENCOUNTER (OUTPATIENT)
Dept: CARDIOLOGY | Facility: CLINIC | Age: 44
Discharge: HOME | End: 2024-06-17
Payer: MEDICARE

## 2024-06-17 DIAGNOSIS — R06.02 SHORTNESS OF BREATH: ICD-10-CM

## 2024-06-17 DIAGNOSIS — R06.00 DYSPNEA, UNSPECIFIED: ICD-10-CM

## 2024-06-17 DIAGNOSIS — R07.9 CHEST PAIN, UNSPECIFIED TYPE: ICD-10-CM

## 2024-06-17 PROCEDURE — 93017 CV STRESS TEST TRACING ONLY: CPT

## 2024-06-17 PROCEDURE — 93016 CV STRESS TEST SUPVJ ONLY: CPT | Performed by: INTERNAL MEDICINE

## 2024-06-17 PROCEDURE — 93018 CV STRESS TEST I&R ONLY: CPT | Performed by: INTERNAL MEDICINE

## 2024-06-18 ENCOUNTER — PROCEDURE VISIT (OUTPATIENT)
Dept: NEUROLOGY | Facility: CLINIC | Age: 44
End: 2024-06-18
Payer: MEDICARE

## 2024-06-18 DIAGNOSIS — G62.9 SMALL FIBER NEUROPATHY: Primary | ICD-10-CM

## 2024-06-18 DIAGNOSIS — M25.571 ACUTE RIGHT ANKLE PAIN: ICD-10-CM

## 2024-06-18 PROCEDURE — 76883 US NRV&ACC STRUX 1XTR COMPRE: CPT | Performed by: STUDENT IN AN ORGANIZED HEALTH CARE EDUCATION/TRAINING PROGRAM

## 2024-06-18 NOTE — PROGRESS NOTES
NEUROMUSCULAR ULTRASOUND OF THE BILATERAL ANKLE    INDICATION:  Clinical Information: Left foot sharp, stabbing pain at the lateral ankle, which is intermittent and comes on when twisting the foot. On the right, there is pain at the achilles tendon. This is going on for 3 months.    Neuromuscular ultrasound to be performed:    (a) to evaluate the echotexture and size of the left tibial nerve at the tarsal tunnel;  (b) to assess for any identifiable structural source of left distal tibial nerve compression.    HEIGHT: 6 ft 0 in  WEIGHT: 220 lbs.    COMPARISON:  None.    TECHNIQUE:  Neuromuscular ultrasound of the left ankle was performed using a [employer] P9 ultrasound machine with a 15-6 MHz matrix linear transducer.  A contralateral comparison study was then made to the contralateral side. The tibial nerve was examined adjacent under the flexor retinaculum adjacent to the medial malleolus, talus and calcaneus. The patient was examined supine. In addition, under the flexor retinaculum, tendons of the tibialis posterior, flexor digitorum longus and flexor hallucis longer were identified. The posterior tibial artery and adjacent veins were also identified.     FINDINGS:  The left tibial nerve CSA was 15.4 mm2 above the tarsal tunnel, and 23.5 mm2 at the tarsal tunnel (mean 13.7 mm2, upper limit of normal 22.3 mm2). The echogenicity of the left tibial nerve was normal. Color Doppler of the left tibial nerve showed normal nerve vascularity. No ganglion or other abnormal mass was appreciated. The posterior tibial artery and adjacent veins were also identified and were normal.     A contralateral comparison study was then done. On the left, the CSA of the tibial nerve was 13.2 mm2 above the tarsal tunnel, and 15.7 mm2 at the tarsal tunnel. The difference between the left and right was 7.8 mm2 (NL < 5.7 mm2).    The left superficial peroneal nerve was identified between extensor digitorum longus and peroneus longus at the  anterior leg. The CSA of the left superficial peroneal nerve at this level was 5.4 mm2. It was then followed distally towards the lateral ankle. The CSA at the ankle was 2.9 mm2.  No focal enlargement or ganglion or other abnormal mass was appreciated.    On the left side, the sciatic nerve was seen in the distal thigh. The maximal left sciatic CSA was 72.8 mm2 (NL < 81 mm2). The echogenicity was normal. In the lateral popliteal fossa, the left sciatic nerve was seen bifurcating into the common peroneal and tibial nerves. The left tibial nerve was larger than the peroneal. In the lateral popliteal fossa, the left tibial nerve had a maximal CSA of 26.8 mm2 (NL < 56). The echogenicity was normal.     In the lateral popliteal fossa, the left common peroneal nerve had a maximal CSA of 13.6 mm2 (NL < 21). The echogenicity was normal. Adjacent to the fibular head the left maximal common peroneal CSA was 12.8 mm2 (NL < 18). The echogenicity was normal.       IMPRESSION:  This is a borderline abnormal neuromuscular ultrasound examination of the left tibial nerve and ankle.     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 The other nearby visualized osseous, ligamentous and tendinous structures of the ankle joint appeared normal. The adjacent posterior tibial artery and veins were normal.    In addition, there was no ultrasound evidence of left superficial peroneal neuropathy.        Performed by: Eleanor Zee MD  Authorized by: Shailesh Gardner DO

## 2024-06-19 ENCOUNTER — HOSPITAL ENCOUNTER (OUTPATIENT)
Dept: CARDIOLOGY | Facility: CLINIC | Age: 44
Discharge: HOME | End: 2024-06-19
Payer: MEDICARE

## 2024-06-19 DIAGNOSIS — R06.02 SHORTNESS OF BREATH: ICD-10-CM

## 2024-06-19 DIAGNOSIS — R07.9 CHEST PAIN, UNSPECIFIED TYPE: ICD-10-CM

## 2024-06-19 PROCEDURE — 93306 TTE W/DOPPLER COMPLETE: CPT

## 2024-06-19 PROCEDURE — 93306 TTE W/DOPPLER COMPLETE: CPT | Performed by: INTERNAL MEDICINE

## 2024-06-20 ENCOUNTER — HOSPITAL ENCOUNTER (OUTPATIENT)
Dept: RADIOLOGY | Facility: CLINIC | Age: 44
Discharge: HOME | End: 2024-06-20
Payer: MEDICARE

## 2024-06-20 DIAGNOSIS — R06.02 SHORTNESS OF BREATH: ICD-10-CM

## 2024-06-20 DIAGNOSIS — R07.9 CHEST PAIN, UNSPECIFIED TYPE: ICD-10-CM

## 2024-06-20 LAB
AORTIC VALVE PEAK VELOCITY: 1.27 M/S
AV PEAK GRADIENT: 6.4 MMHG
AVA (PEAK VEL): 3.15 CM2
EJECTION FRACTION APICAL 4 CHAMBER: 51.6
EJECTION FRACTION: 62 %
LEFT ATRIUM VOLUME AREA LENGTH INDEX BSA: 26.8 ML/M2
LEFT VENTRICLE INTERNAL DIMENSION DIASTOLE: 5.55 CM (ref 3.5–6)
LEFT VENTRICULAR OUTFLOW TRACT DIAMETER: 2.37 CM
MITRAL VALVE E/A RATIO: 1.35
MITRAL VALVE E/E' RATIO: 6.91
RIGHT VENTRICLE FREE WALL PEAK S': 17 CM/S
RIGHT VENTRICLE PEAK SYSTOLIC PRESSURE: 13.5 MMHG
TRICUSPID ANNULAR PLANE SYSTOLIC EXCURSION: 2.6 CM

## 2024-06-20 PROCEDURE — 75571 CT HRT W/O DYE W/CA TEST: CPT

## 2024-06-24 DIAGNOSIS — G57.52 TARSAL TUNNEL SYNDROME, LEFT: Primary | ICD-10-CM

## 2024-06-27 ENCOUNTER — OFFICE VISIT (OUTPATIENT)
Dept: ORTHOPEDIC SURGERY | Facility: CLINIC | Age: 44
End: 2024-06-27
Payer: MEDICARE

## 2024-06-27 ENCOUNTER — APPOINTMENT (OUTPATIENT)
Dept: ALLERGY | Facility: CLINIC | Age: 44
End: 2024-06-27
Payer: MEDICARE

## 2024-06-27 VITALS — HEIGHT: 72 IN | WEIGHT: 222.8 LBS | HEART RATE: 73 BPM | RESPIRATION RATE: 20 BRPM | BODY MASS INDEX: 30.18 KG/M2

## 2024-06-27 VITALS — WEIGHT: 228 LBS | HEIGHT: 72 IN | BODY MASS INDEX: 30.88 KG/M2

## 2024-06-27 DIAGNOSIS — J30.81 ALLERGIC RHINITIS DUE TO ANIMAL (CAT) (DOG) HAIR AND DANDER: ICD-10-CM

## 2024-06-27 DIAGNOSIS — J30.89 NON-SEASONAL ALLERGIC RHINITIS, UNSPECIFIED TRIGGER: ICD-10-CM

## 2024-06-27 DIAGNOSIS — H10.45 CHRONIC ALLERGIC CONJUNCTIVITIS: ICD-10-CM

## 2024-06-27 DIAGNOSIS — T50.Z95A ADVERSE EFFECT OF OTHER VACCINES AND BIOLOGICAL SUBSTANCES, INITIAL ENCOUNTER: Primary | ICD-10-CM

## 2024-06-27 DIAGNOSIS — G56.03 CARPAL TUNNEL SYNDROME, BILATERAL: Primary | ICD-10-CM

## 2024-06-27 PROCEDURE — 2500000004 HC RX 250 GENERAL PHARMACY W/ HCPCS (ALT 636 FOR OP/ED): Performed by: ORTHOPAEDIC SURGERY

## 2024-06-27 PROCEDURE — 3008F BODY MASS INDEX DOCD: CPT | Performed by: ORTHOPAEDIC SURGERY

## 2024-06-27 PROCEDURE — 1036F TOBACCO NON-USER: CPT | Performed by: ALLERGY & IMMUNOLOGY

## 2024-06-27 PROCEDURE — 3008F BODY MASS INDEX DOCD: CPT | Performed by: ALLERGY & IMMUNOLOGY

## 2024-06-27 PROCEDURE — 99214 OFFICE O/P EST MOD 30 MIN: CPT | Performed by: ALLERGY & IMMUNOLOGY

## 2024-06-27 PROCEDURE — 99213 OFFICE O/P EST LOW 20 MIN: CPT | Mod: 25 | Performed by: ORTHOPAEDIC SURGERY

## 2024-06-27 PROCEDURE — 1036F TOBACCO NON-USER: CPT | Performed by: ORTHOPAEDIC SURGERY

## 2024-06-27 PROCEDURE — 95117 IMMUNOTHERAPY INJECTIONS: CPT | Performed by: ALLERGY & IMMUNOLOGY

## 2024-06-27 PROCEDURE — 20526 THER INJECTION CARP TUNNEL: CPT | Mod: 50 | Performed by: ORTHOPAEDIC SURGERY

## 2024-06-27 PROCEDURE — 99213 OFFICE O/P EST LOW 20 MIN: CPT | Performed by: ORTHOPAEDIC SURGERY

## 2024-06-27 PROCEDURE — 2500000005 HC RX 250 GENERAL PHARMACY W/O HCPCS: Performed by: ORTHOPAEDIC SURGERY

## 2024-06-27 PROCEDURE — 95004 PERQ TESTS W/ALRGNC XTRCS: CPT | Performed by: ALLERGY & IMMUNOLOGY

## 2024-06-27 RX ORDER — LIDOCAINE HYDROCHLORIDE 10 MG/ML
0.5 INJECTION INFILTRATION; PERINEURAL
Status: COMPLETED | OUTPATIENT
Start: 2024-06-27 | End: 2024-06-27

## 2024-06-27 RX ORDER — TRIAMCINOLONE ACETONIDE 40 MG/ML
20 INJECTION, SUSPENSION INTRA-ARTICULAR; INTRAMUSCULAR
Status: COMPLETED | OUTPATIENT
Start: 2024-06-27 | End: 2024-06-27

## 2024-06-27 RX ORDER — EPINEPHRINE 0.3 MG/.3ML
0.3 INJECTION SUBCUTANEOUS ONCE AS NEEDED
Qty: 2 EACH | Refills: 3 | Status: SHIPPED | OUTPATIENT
Start: 2024-06-27

## 2024-06-27 ASSESSMENT — PAIN SCALES - GENERAL
PAINLEVEL_OUTOF10: 5 - MODERATE PAIN
PAINLEVEL: 0-NO PAIN

## 2024-06-27 ASSESSMENT — PAIN DESCRIPTION - DESCRIPTORS: DESCRIPTORS: ACHING;SORE

## 2024-06-27 ASSESSMENT — PAIN - FUNCTIONAL ASSESSMENT: PAIN_FUNCTIONAL_ASSESSMENT: 0-10

## 2024-06-27 NOTE — PROGRESS NOTES
Subjective   Patient ID:   [id number]   Luis Condon Jr. is a 43 y.o. male who presents for Allergy Testing.    Chief Complaint   Patient presents with    Allergy Testing          HPI  This patient is here to evaluate for:    Immunotherapy started: 7/21/22  This patient is taking allergy shots receiving (ml): 0.5  every: month  Last shot: today  Peak flows: n/a  Location: [place]   Symptoms on the allergy shots have improved.  Medications used are per the chart.  He is better than normally is.   itchy eyes, stuffy.   he is on zyrtec.      This patient is not on a beta blocker.  There have been no systemic or delayed allergic reactions to the allergy immunotherapy. The size of the shot reactions are small. They are small         Review of Systems   All other systems reviewed and are negative.        Objective     Pulse 73   Resp 20   Ht 1.829 m (6')   Wt 101 kg (222 lb 12.8 oz)   BMI 30.22 kg/m²      Physical Exam  Constitutional:       General: He is not in acute distress.     Appearance: Normal appearance. He is not ill-appearing.   HENT:      Head: Normocephalic and atraumatic.      Right Ear: Tympanic membrane, ear canal and external ear normal.      Left Ear: Tympanic membrane, ear canal and external ear normal.      Nose: Nose normal. No congestion or rhinorrhea.      Mouth/Throat:      Mouth: Mucous membranes are moist.      Pharynx: Oropharynx is clear. No oropharyngeal exudate or posterior oropharyngeal erythema.   Eyes:      General:         Right eye: No discharge.         Left eye: No discharge.      Conjunctiva/sclera: Conjunctivae normal.   Cardiovascular:      Rate and Rhythm: Normal rate and regular rhythm.      Heart sounds: Normal heart sounds. No murmur heard.     No friction rub. No gallop.   Pulmonary:      Effort: Pulmonary effort is normal. No respiratory distress.      Breath sounds: Normal breath sounds. No stridor. No wheezing, rhonchi or rales.   Chest:      Chest wall: No tenderness.    Abdominal:      General: Abdomen is flat.      Palpations: Abdomen is soft.   Musculoskeletal:         General: Normal range of motion.      Cervical back: Normal range of motion and neck supple.   Lymphadenopathy:      Cervical: No cervical adenopathy.   Skin:     General: Skin is warm and dry.      Findings: No erythema, lesion or rash.   Neurological:      General: No focal deficit present.      Mental Status: He is alert. Mental status is at baseline.   Psychiatric:         Mood and Affect: Mood normal.         Behavior: Behavior normal.         Thought Content: Thought content normal.         Judgment: Judgment normal.            Current Outpatient Medications   Medication Sig Dispense Refill    albuterol (ProAir HFA) 90 mcg/actuation inhaler Inhale 2 puffs every 4 hours if needed for wheezing or shortness of breath. 8.5 g 1    albuterol 2.5 mg /3 mL (0.083 %) nebulizer solution Inhale 3 mL (2.5 mg) every 6 hours if needed.      ALPRAZolam (Xanax) 2 mg tablet Take 1 tablet (2 mg) by mouth 4 times a day as needed.      azelastine (Astelin) 137 mcg (0.1 %) nasal spray Use in each nostril as directed      buPROPion (Wellbutrin) 75 mg tablet Take 2 tablets (150 mg) by mouth 2 times a day.      cetirizine (ZyrTEC) 10 mg tablet       cloNIDine (Catapres) 0.1 mg tablet Take 1 tablet (0.1 mg) by mouth 3 times a day.      cyclobenzaprine (Flexeril) 10 mg tablet TAKE 1 TABLET BY MOUTH TWICE A DAY 60 tablet 0    EPINEPHrine 0.3 mg/0.3 mL injection syringe Inject 0.3 mL (0.3 mg) as directed. As directed      fluticasone (Flonase) 50 mcg/actuation nasal spray Shake gently. Before first use, prime pump. After use, clean tip and replace cap.      fluticasone (Flovent) 220 mcg/actuation inhaler Inhale 2 puffs 2 times a day. Rinse mouth after use      gabapentin (Neurontin) 100 mg capsule Take 1 capsule (100 mg) by mouth 3 times a day.      gabapentin (Neurontin) 600 mg tablet TAKE 3 TABLETS BY MOUTH 3 TIMES DAILY AS DIRECTED  810 tablet 1    hydrOXYzine HCL (Atarax) 25 mg tablet Take 2 tablets (50 mg) by mouth once daily at bedtime.      hydrOXYzine pamoate (Vistaril) 25 mg capsule TAKE 3-4 CAPSULES BY MOUTH AT BEDTIME AS NEEDED      icosapent ethyL (Vascepa) 1 gram capsule Take 2 capsules (2 g) by mouth 2 times a day with meals. 120 capsule 2    lisinopril 20 mg tablet Take 1 tablet (20 mg) by mouth once daily. 90 tablet 1    lubiprostone (Amitiza) 24 mcg capsule Take 1 capsule (24 mcg) by mouth 2 times a day. With food 180 capsule 1    meloxicam (Mobic) 15 mg tablet TAKE 1 TABLET BY MOUTH EVERY DAY AS NEEDED 30 tablet 5    methylPREDNISolone (Medrol Dospak) 4 mg tablets Take as directed      mirtazapine (Remeron) 30 mg tablet Take 2 tablets (60 mg) by mouth once daily at bedtime.      multivitamin tablet Take 1 tablet by mouth once daily.      naloxone (Narcan) 4 mg/0.1 mL nasal spray Administer 1 spray (4 mg) into affected nostril(s). 1 actuation in one nostril x1, may repeat dose q2-3min until pt responsive or EMS arrives      omeprazole (PriLOSEC) 40 mg DR capsule Take 1 capsule (40 mg) by mouth once daily in the morning. Take before meals. Do not crush or chew. 90 capsule 1    oxyCODONE (Roxicodone) 10 mg immediate release tablet Take 1 tablet (10 mg) by mouth every 6 hours if needed for severe pain (7 - 10). 105 tablet 0    polyethylene glycol (Glycolax) 17 gram/dose powder Take 17 g by mouth once daily. X 2 weeks, then as needed      propranolol (Inderal) 10 mg tablet Take 1 tablet (10 mg) by mouth 2 times a day as needed (anxiety).      prucalopride (Motegrity) 2 mg tablet Take 1 tablet (2 mg) by mouth once daily.      TURMERIC ORAL Turmeric 500 MG Oral Capsule      ZINC ORAL Zinc 100 MG Oral Tablet       No current facility-administered medications for this visit.       Summary of the labs over the past 6 months:    Hospital Outpatient Visit on 06/19/2024   Component Date Value Ref Range Status    AV pk peewee 06/19/2024 1.27  m/s  Final    LVOT diam 06/19/2024 2.37  cm Final    MV avg E/e' ratio 06/19/2024 6.91   Final    MV E/A ratio 06/19/2024 1.35   Final    LA vol index A/L 06/19/2024 26.8  ml/m2 Final    Tricuspid annular plane systolic e* 06/19/2024 2.6  cm Final    LV EF 06/19/2024 62  % Final    RV free wall pk S' 06/19/2024 17.00  cm/s Final    LVIDd 06/19/2024 5.55  cm Final    RVSP 06/19/2024 13.5  mmHg Final    Aortic Valve Area by Continuity of* 06/19/2024 3.15  cm2 Final    AV pk grad 06/19/2024 6.4  mmHg Final    LV A4C EF 06/19/2024 51.6   Final   Office Visit on 05/29/2024   Component Date Value Ref Range Status    Ventricular Rate 05/29/2024 53  BPM Final    Atrial Rate 05/29/2024 53  BPM Final    MI Interval 05/29/2024 164  ms Final    QRS Duration 05/29/2024 86  ms Final    QT Interval 05/29/2024 394  ms Final    QTC Calculation(Bazett) 05/29/2024 369  ms Final    P Axis 05/29/2024 53  degrees Final    R Axis 05/29/2024 55  degrees Final    T Axis 05/29/2024 35  degrees Final    QRS Count 05/29/2024 9  beats Final    Q Onset 05/29/2024 220  ms Final    P Onset 05/29/2024 138  ms Final    P Offset 05/29/2024 191  ms Final    T Offset 05/29/2024 417  ms Final    QTC Fredericia 05/29/2024 378  ms Final   Lab on 04/30/2024   Component Date Value Ref Range Status    Collection period 04/30/2024 24  hrs Final    Urine Volume 04/30/2024 2,950  mL Final    Total Protein, Urine 04/30/2024 <4 (L)  5 - 25 mg/dL Final    Total Protein,  24 Hour Urine 04/30/2024    Final    Creatinine, Urine 04/30/2024 29.7  20.0 - 370.0 mg/dL Final    Creatinine, 24 Hour Urine 04/30/2024 0.88  0.87 - 2.41 g/24 h Final    Collection period 04/30/2024 24  hrs Final    Urine Volume 04/30/2024 2,950  mL Final    Total Protein, Urine 04/30/2024 <4 (L)  5 - 25 mg/dL Final    Total Protein,  24 Hour Urine 04/30/2024 <118  mg/24h Final    Albumin % 04/30/2024 35.4  % Final    Alpha 1 Globulin % 04/30/2024 12.5  % Final    Alpha 2 Globulin % 04/30/2024 11.2  %  Final    Beta Globulin % 04/30/2024 13.7  % Final    Gamma Globulin % 04/30/2024 27.2  % Final    Urine Electrophoresis Comment 04/30/2024 Normal.      Final    Path Review-Urine Protein Electrop* 04/30/2024 Reviewed and approved by JING SANTIAGO on 5/4/24 at 12:59 PM.       Final    Path Review - Urine Immunofixation 04/30/2024 Reviewed and approved by JING SANTIAGO on 5/4/24 at 12:59 PM.       Final    Immunofixation Comment 04/30/2024 No monoclonal protein detected by immunofixation.   Final    Extra Tube 04/30/2024 Hold for add-ons.   Final   Lab on 04/26/2024   Component Date Value Ref Range Status    Glucose 04/26/2024 102 (H)  74 - 99 mg/dL Final    Sodium 04/26/2024 138  136 - 145 mmol/L Final    Potassium 04/26/2024 4.3  3.5 - 5.3 mmol/L Final    Chloride 04/26/2024 103  98 - 107 mmol/L Final    Bicarbonate 04/26/2024 29  21 - 32 mmol/L Final    Anion Gap 04/26/2024 10  mmol/L Final    Urea Nitrogen 04/26/2024 9  6 - 23 mg/dL Final    Creatinine 04/26/2024 1.21  0.50 - 1.30 mg/dL Final    eGFR 04/26/2024 77  >60 mL/min/1.73m*2 Final    Calcium 04/26/2024 9.1  8.6 - 10.3 mg/dL Final    Albumin 04/26/2024 4.3  3.4 - 5.0 g/dL Final    Alkaline Phosphatase 04/26/2024 71  33 - 120 U/L Final    Total Protein 04/26/2024 7.1  6.4 - 8.2 g/dL Final    AST 04/26/2024 23  9 - 39 U/L Final    Bilirubin, Total 04/26/2024 0.4  0.0 - 1.2 mg/dL Final    ALT 04/26/2024 21  10 - 52 U/L Final    Cholesterol 04/26/2024 198  0 - 199 mg/dL Final    HDL-Cholesterol 04/26/2024 27.9  mg/dL Final    Cholesterol/HDL Ratio 04/26/2024 7.1   Final    LDL Calculated 04/26/2024    Final    VLDL 04/26/2024    Final    Triglycerides 04/26/2024 593 (H)  0 - 149 mg/dL Final    Non HDL Cholesterol 04/26/2024 170 (H)  0 - 149 mg/dL Final    Hemoglobin A1C 04/26/2024 5.6  see below % Final    Estimated Average Glucose 04/26/2024 114  Not Established mg/dL Final    3-Hydroxy-3-Methylglutaryl Coenzym* 04/26/2024 <3  0 - 19 Units Final    Ig Kappa  Free Light Chain 04/26/2024 1.98 (H)  0.33 - 1.94 mg/dL Final    Ig Lambda Free Light Chain 04/26/2024 1.40  0.57 - 2.63 mg/dL Final    Kappa/Lambda Ratio 04/26/2024 1.41  0.26 - 1.65 Final    Total Protein 04/26/2024 7.3  6.4 - 8.2 g/dL Final    Albumin 04/26/2024 4.4  3.4 - 5.0 g/dL Final    Alpha 1 Globulin 04/26/2024 0.2  0.2 - 0.6 g/dL Final    Alpha 2 Globulin 04/26/2024 0.5  0.4 - 1.1 g/dL Final    Beta Globulin 04/26/2024 0.7  0.5 - 1.2 g/dL Final    Gamma 04/26/2024 1.4  0.5 - 1.4 g/dL Final    Protein Electrophoresis Comment 04/26/2024 Normal.   Final    Immunofixation Comment 04/26/2024 No monoclonal protein detected by immunofixation.   Final    Path Review - Serum Protein Electr* 04/26/2024 Reviewed and approved by JING SANTIAGO on 5/2/24 at 11:53 AM.       Final    Path Review - Serum Immunofixation 04/26/2024 Reviewed and approved by JING SANTIAGO on 5/2/24 at 11:53 AM.       Final   Lab on 02/29/2024   Component Date Value Ref Range Status    Total Protein, Urine Random 02/29/2024 <4 (L)  5 - 25 mg/dL Final    Creatinine, Urine Random 02/29/2024 46.4  20.0 - 370.0 mg/dL Final    T. Protein/Creatinine Ratio 02/29/2024    Final    C-Reactive Protein 02/29/2024 <0.10  <1.00 mg/dL Final    Sedimentation Rate 02/29/2024 <1  0 - 15 mm/h Final    Creatine Kinase 02/29/2024 690 (H)  0 - 325 U/L Final    Color, Urine 02/29/2024 Straw  Straw, Yellow Final    Appearance, Urine 02/29/2024 Clear  Clear Final    Specific Gravity, Urine 02/29/2024 1.004 (N)  1.005 - 1.035 Final    pH, Urine 02/29/2024 7.0  5.0, 5.5, 6.0, 6.5, 7.0, 7.5, 8.0 Final    Protein, Urine 02/29/2024 NEGATIVE  NEGATIVE mg/dL Final    Glucose, Urine 02/29/2024 NEGATIVE  NEGATIVE mg/dL Final    Blood, Urine 02/29/2024 NEGATIVE  NEGATIVE Final    Ketones, Urine 02/29/2024 NEGATIVE  NEGATIVE mg/dL Final    Bilirubin, Urine 02/29/2024 NEGATIVE  NEGATIVE Final    Urobilinogen, Urine 02/29/2024 <2.0  <2.0 mg/dL Final    Nitrite, Urine 02/29/2024  NEGATIVE  NEGATIVE Final    Leukocyte Esterase, Urine 02/29/2024 NEGATIVE  NEGATIVE Final    Extra Tube 02/29/2024 Hold for add-ons.   Final         Assessment/Plan   Diagnoses and all orders for this visit:  Adverse effect of other vaccines and biological substances, initial encounter  Allergic rhinitis due to animal (cat) (dog) hair and dander  Non-seasonal allergic rhinitis, unspecified trigger  -     EPINEPHrine 0.3 mg/0.3 mL injection syringe; Inject 0.3 mL (0.3 mg) into the muscle 1 time if needed for anaphylaxis for up to 1 dose. As directed  Chronic allergic conjunctivitis  -     EPINEPHrine 0.3 mg/0.3 mL injection syringe; Inject 0.3 mL (0.3 mg) into the muscle 1 time if needed for anaphylaxis for up to 1 dose. As directed      I reviewed the previous allergy skin testing and current vial formulas. We reviewed the benefits of immunotherapy and continuing maintenance immunotherapy dosing at approximately once per month. We assessed for improvement of symptoms and for any occurrence of local, delayed, or systemic reactions. The patient's symptoms have improved since starting immunotherapy and they have not had any systemic or significant allergic reactions.     The patient has an epinephrine autoinjector, brings it to the injection visits, and is aware of how to use it and waits 30 minutes after the shot as directed.     Medications have been refilled as needed.    Allergy shot given today without incident.    Deejay Garcia MD

## 2024-07-01 NOTE — PROGRESS NOTES
See Allergy Shot Immunotherapy Record Book  Allergy checklist done, no concerns                  Subjective   Patient ID:   [id number]   Luis Condon Jr. is a 43 y.o. male who presents for No chief complaint on file..    No chief complaint on file.         HPI  This patient is here to evaluate for:      Review of Systems      Objective     There were no vitals taken for this visit.     Physical Exam       Current Outpatient Medications   Medication Sig Dispense Refill    albuterol (ProAir HFA) 90 mcg/actuation inhaler Inhale 2 puffs every 4 hours if needed for wheezing or shortness of breath. 8.5 g 1    albuterol 2.5 mg /3 mL (0.083 %) nebulizer solution Inhale 3 mL (2.5 mg) every 6 hours if needed.      ALPRAZolam (Xanax) 2 mg tablet Take 1 tablet (2 mg) by mouth 4 times a day as needed.      azelastine (Astelin) 137 mcg (0.1 %) nasal spray Use in each nostril as directed      buPROPion (Wellbutrin) 75 mg tablet Take 2 tablets (150 mg) by mouth 2 times a day.      cetirizine (ZyrTEC) 10 mg tablet       cloNIDine (Catapres) 0.1 mg tablet Take 1 tablet (0.1 mg) by mouth 3 times a day.      cyclobenzaprine (Flexeril) 10 mg tablet TAKE 1 TABLET BY MOUTH TWICE A DAY 60 tablet 0    EPINEPHrine 0.3 mg/0.3 mL injection syringe Inject 0.3 mL (0.3 mg) into the muscle 1 time if needed for anaphylaxis for up to 1 dose. As directed 2 each 3    fluticasone (Flonase) 50 mcg/actuation nasal spray Shake gently. Before first use, prime pump. After use, clean tip and replace cap.      fluticasone (Flovent) 220 mcg/actuation inhaler Inhale 2 puffs 2 times a day. Rinse mouth after use      gabapentin (Neurontin) 100 mg capsule Take 1 capsule (100 mg) by mouth 3 times a day.      gabapentin (Neurontin) 600 mg tablet TAKE 3 TABLETS BY MOUTH 3 TIMES DAILY AS DIRECTED 810 tablet 1    hydrOXYzine HCL (Atarax) 25 mg tablet Take 2 tablets (50 mg) by mouth once daily at bedtime.      hydrOXYzine pamoate (Vistaril) 25 mg capsule TAKE 3-4  CAPSULES BY MOUTH AT BEDTIME AS NEEDED      icosapent ethyL (Vascepa) 1 gram capsule Take 2 capsules (2 g) by mouth 2 times a day with meals. 120 capsule 2    lisinopril 20 mg tablet Take 1 tablet (20 mg) by mouth once daily. 90 tablet 1    lubiprostone (Amitiza) 24 mcg capsule Take 1 capsule (24 mcg) by mouth 2 times a day. With food 180 capsule 1    meloxicam (Mobic) 15 mg tablet TAKE 1 TABLET BY MOUTH EVERY DAY AS NEEDED 30 tablet 5    methylPREDNISolone (Medrol Dospak) 4 mg tablets Take as directed      mirtazapine (Remeron) 30 mg tablet Take 2 tablets (60 mg) by mouth once daily at bedtime.      multivitamin tablet Take 1 tablet by mouth once daily.      naloxone (Narcan) 4 mg/0.1 mL nasal spray Administer 1 spray (4 mg) into affected nostril(s). 1 actuation in one nostril x1, may repeat dose q2-3min until pt responsive or EMS arrives      omeprazole (PriLOSEC) 40 mg DR capsule Take 1 capsule (40 mg) by mouth once daily in the morning. Take before meals. Do not crush or chew. 90 capsule 1    oxyCODONE (Roxicodone) 10 mg immediate release tablet Take 1 tablet (10 mg) by mouth every 6 hours if needed for severe pain (7 - 10). 105 tablet 0    polyethylene glycol (Glycolax) 17 gram/dose powder Take 17 g by mouth once daily. X 2 weeks, then as needed      propranolol (Inderal) 10 mg tablet Take 1 tablet (10 mg) by mouth 2 times a day as needed (anxiety).      prucalopride (Motegrity) 2 mg tablet Take 1 tablet (2 mg) by mouth once daily.      TURMERIC ORAL Turmeric 500 MG Oral Capsule      ZINC ORAL Zinc 100 MG Oral Tablet       No current facility-administered medications for this visit.       Summary of the labs over the past 6 months:    Office Visit on 05/29/2024   Component Date Value Ref Range Status    Ventricular Rate 05/29/2024 53  BPM Final    Atrial Rate 05/29/2024 53  BPM Final    WI Interval 05/29/2024 164  ms Final    QRS Duration 05/29/2024 86  ms Final    QT Interval 05/29/2024 394  ms Final    QTC  Calculation(Bazett) 05/29/2024 369  ms Final    P Axis 05/29/2024 53  degrees Final    R Axis 05/29/2024 55  degrees Final    T Axis 05/29/2024 35  degrees Final    QRS Count 05/29/2024 9  beats Final    Q Onset 05/29/2024 220  ms Final    P Onset 05/29/2024 138  ms Final    P Offset 05/29/2024 191  ms Final    T Offset 05/29/2024 417  ms Final    QTC Fredericia 05/29/2024 378  ms Final   Lab on 04/30/2024   Component Date Value Ref Range Status    Collection period 04/30/2024 24  hrs Final    Urine Volume 04/30/2024 2,950  mL Final    Total Protein, Urine 04/30/2024 <4 (L)  5 - 25 mg/dL Final    Total Protein,  24 Hour Urine 04/30/2024    Final    Creatinine, Urine 04/30/2024 29.7  20.0 - 370.0 mg/dL Final    Creatinine, 24 Hour Urine 04/30/2024 0.88  0.87 - 2.41 g/24 h Final    Collection period 04/30/2024 24  hrs Final    Urine Volume 04/30/2024 2,950  mL Final    Total Protein, Urine 04/30/2024 <4 (L)  5 - 25 mg/dL Final    Total Protein,  24 Hour Urine 04/30/2024 <118  mg/24h Final    Albumin % 04/30/2024 35.4  % Final    Alpha 1 Globulin % 04/30/2024 12.5  % Final    Alpha 2 Globulin % 04/30/2024 11.2  % Final    Beta Globulin % 04/30/2024 13.7  % Final    Gamma Globulin % 04/30/2024 27.2  % Final    Urine Electrophoresis Comment 04/30/2024 Normal.      Final    Path Review-Urine Protein Electrop* 04/30/2024 Reviewed and approved by JING SANTIAGO on 5/4/24 at 12:59 PM.       Final    Path Review - Urine Immunofixation 04/30/2024 Reviewed and approved by JING SANTIAGO on 5/4/24 at 12:59 PM.       Final    Immunofixation Comment 04/30/2024 No monoclonal protein detected by immunofixation.   Final    Extra Tube 04/30/2024 Hold for add-ons.   Final   Lab on 04/26/2024   Component Date Value Ref Range Status    Glucose 04/26/2024 102 (H)  74 - 99 mg/dL Final    Sodium 04/26/2024 138  136 - 145 mmol/L Final    Potassium 04/26/2024 4.3  3.5 - 5.3 mmol/L Final    Chloride 04/26/2024 103  98 - 107 mmol/L Final     Bicarbonate 04/26/2024 29  21 - 32 mmol/L Final    Anion Gap 04/26/2024 10  mmol/L Final    Urea Nitrogen 04/26/2024 9  6 - 23 mg/dL Final    Creatinine 04/26/2024 1.21  0.50 - 1.30 mg/dL Final    eGFR 04/26/2024 77  >60 mL/min/1.73m*2 Final    Calcium 04/26/2024 9.1  8.6 - 10.3 mg/dL Final    Albumin 04/26/2024 4.3  3.4 - 5.0 g/dL Final    Alkaline Phosphatase 04/26/2024 71  33 - 120 U/L Final    Total Protein 04/26/2024 7.1  6.4 - 8.2 g/dL Final    AST 04/26/2024 23  9 - 39 U/L Final    Bilirubin, Total 04/26/2024 0.4  0.0 - 1.2 mg/dL Final    ALT 04/26/2024 21  10 - 52 U/L Final    Cholesterol 04/26/2024 198  0 - 199 mg/dL Final    HDL-Cholesterol 04/26/2024 27.9  mg/dL Final    Cholesterol/HDL Ratio 04/26/2024 7.1   Final    LDL Calculated 04/26/2024    Final    VLDL 04/26/2024    Final    Triglycerides 04/26/2024 593 (H)  0 - 149 mg/dL Final    Non HDL Cholesterol 04/26/2024 170 (H)  0 - 149 mg/dL Final    Hemoglobin A1C 04/26/2024 5.6  see below % Final    Estimated Average Glucose 04/26/2024 114  Not Established mg/dL Final    3-Hydroxy-3-Methylglutaryl Coenzym* 04/26/2024 <3  0 - 19 Units Final    Ig Kappa Free Light Chain 04/26/2024 1.98 (H)  0.33 - 1.94 mg/dL Final    Ig Lambda Free Light Chain 04/26/2024 1.40  0.57 - 2.63 mg/dL Final    Kappa/Lambda Ratio 04/26/2024 1.41  0.26 - 1.65 Final    Total Protein 04/26/2024 7.3  6.4 - 8.2 g/dL Final    Albumin 04/26/2024 4.4  3.4 - 5.0 g/dL Final    Alpha 1 Globulin 04/26/2024 0.2  0.2 - 0.6 g/dL Final    Alpha 2 Globulin 04/26/2024 0.5  0.4 - 1.1 g/dL Final    Beta Globulin 04/26/2024 0.7  0.5 - 1.2 g/dL Final    Gamma 04/26/2024 1.4  0.5 - 1.4 g/dL Final    Protein Electrophoresis Comment 04/26/2024 Normal.   Final    Immunofixation Comment 04/26/2024 No monoclonal protein detected by immunofixation.   Final    Path Review - Serum Protein Electr* 04/26/2024 Reviewed and approved by JING SANTIAGO on 5/2/24 at 11:53 AM.       Final    Path Review - Serum  Immunofixation 04/26/2024 Reviewed and approved by JING SANTIAGO on 5/2/24 at 11:53 AM.       Final   Lab on 02/29/2024   Component Date Value Ref Range Status    Total Protein, Urine Random 02/29/2024 <4 (L)  5 - 25 mg/dL Final    Creatinine, Urine Random 02/29/2024 46.4  20.0 - 370.0 mg/dL Final    T. Protein/Creatinine Ratio 02/29/2024    Final    C-Reactive Protein 02/29/2024 <0.10  <1.00 mg/dL Final    Sedimentation Rate 02/29/2024 <1  0 - 15 mm/h Final    Creatine Kinase 02/29/2024 690 (H)  0 - 325 U/L Final    Color, Urine 02/29/2024 Straw  Straw, Yellow Final    Appearance, Urine 02/29/2024 Clear  Clear Final    Specific Gravity, Urine 02/29/2024 1.004 (N)  1.005 - 1.035 Final    pH, Urine 02/29/2024 7.0  5.0, 5.5, 6.0, 6.5, 7.0, 7.5, 8.0 Final    Protein, Urine 02/29/2024 NEGATIVE  NEGATIVE mg/dL Final    Glucose, Urine 02/29/2024 NEGATIVE  NEGATIVE mg/dL Final    Blood, Urine 02/29/2024 NEGATIVE  NEGATIVE Final    Ketones, Urine 02/29/2024 NEGATIVE  NEGATIVE mg/dL Final    Bilirubin, Urine 02/29/2024 NEGATIVE  NEGATIVE Final    Urobilinogen, Urine 02/29/2024 <2.0  <2.0 mg/dL Final    Nitrite, Urine 02/29/2024 NEGATIVE  NEGATIVE Final    Leukocyte Esterase, Urine 02/29/2024 NEGATIVE  NEGATIVE Final    Extra Tube 02/29/2024 Hold for add-ons.   Final         Assessment/Plan           Deejay Garcia MD

## 2024-07-05 DIAGNOSIS — E78.5 HYPERLIPIDEMIA, UNSPECIFIED HYPERLIPIDEMIA TYPE: ICD-10-CM

## 2024-07-05 DIAGNOSIS — I10 ESSENTIAL HYPERTENSION: Primary | ICD-10-CM

## 2024-07-08 ENCOUNTER — HOSPITAL ENCOUNTER (OUTPATIENT)
Dept: RADIOLOGY | Facility: CLINIC | Age: 44
Discharge: HOME | End: 2024-07-08
Payer: MEDICARE

## 2024-07-08 ENCOUNTER — OFFICE VISIT (OUTPATIENT)
Dept: ORTHOPEDIC SURGERY | Facility: CLINIC | Age: 44
End: 2024-07-08
Payer: MEDICARE

## 2024-07-08 VITALS — HEIGHT: 72 IN | WEIGHT: 220 LBS | BODY MASS INDEX: 29.8 KG/M2

## 2024-07-08 DIAGNOSIS — J30.89 ALLERGIC RHINITIS DUE TO OTHER ALLERGIC TRIGGER, UNSPECIFIED SEASONALITY: Primary | ICD-10-CM

## 2024-07-08 DIAGNOSIS — J30.1 ALLERGIC REACTION TO INHALED POLLEN: ICD-10-CM

## 2024-07-08 DIAGNOSIS — M79.671 BILATERAL FOOT PAIN: ICD-10-CM

## 2024-07-08 DIAGNOSIS — G57.52 TARSAL TUNNEL SYNDROME, LEFT: ICD-10-CM

## 2024-07-08 DIAGNOSIS — G60.3 IDIOPATHIC PROGRESSIVE NEUROPATHY: Primary | ICD-10-CM

## 2024-07-08 DIAGNOSIS — M79.672 BILATERAL FOOT PAIN: ICD-10-CM

## 2024-07-08 PROCEDURE — 3008F BODY MASS INDEX DOCD: CPT | Performed by: ORTHOPAEDIC SURGERY

## 2024-07-08 PROCEDURE — 1036F TOBACCO NON-USER: CPT | Performed by: ORTHOPAEDIC SURGERY

## 2024-07-08 PROCEDURE — 99204 OFFICE O/P NEW MOD 45 MIN: CPT | Performed by: ORTHOPAEDIC SURGERY

## 2024-07-08 PROCEDURE — 73630 X-RAY EXAM OF FOOT: CPT | Mod: 50

## 2024-07-08 PROCEDURE — 73630 X-RAY EXAM OF FOOT: CPT | Mod: BILATERAL PROCEDURE | Performed by: RADIOLOGY

## 2024-07-08 PROCEDURE — 99214 OFFICE O/P EST MOD 30 MIN: CPT | Performed by: ORTHOPAEDIC SURGERY

## 2024-07-08 PROCEDURE — 95165 ANTIGEN THERAPY SERVICES: CPT | Performed by: ALLERGY & IMMUNOLOGY

## 2024-07-08 ASSESSMENT — PAIN - FUNCTIONAL ASSESSMENT: PAIN_FUNCTIONAL_ASSESSMENT: 0-10

## 2024-07-08 ASSESSMENT — PAIN SCALES - GENERAL: PAINLEVEL_OUTOF10: 5 - MODERATE PAIN

## 2024-07-08 NOTE — PROGRESS NOTES
"Orthopaedic Surgery  New Patient Clinic Note    Luis Condon Jr. [id number] July 8, 2024    Reason for Consult: Bilateral foot numbness    HPI: 44 yo M with numerous comorbidities including polyneuropathy presents for evaluation of bilateral foot numbness with concern for possible left tarsal tunnel syndrome, referred by Dr. Yan. He has noticed left lateral foot radiating pain and pins/needles sensation since January 2024. It is worse with movements like ankle plantarflexion and inversion but can occur anytime. He also endorses right posterior ankle/foot \"dull\" feeling/numbness with walking and going up/down stairs that has been present since March 2024. He sees a neurologist and takes gabapentin. He had a recent neuromuscular ultrasound concerning for possible left tarsal tunnel syndrome, however he has not had a recent EMG. He denies other treatments. He has tripped a few times but the decreased sensation in his feet has not caused him to fall.    ROS:  15 point review of systems collected per intake sheet and negative except for as noted in HPI.    PMH:   Past Medical History:   Diagnosis Date    Disorder of muscle, unspecified 04/10/2018    Disorder of muscle, unspecified    Disorder of prostate, unspecified     Prostate disease    Hiccough 02/19/2020    Intractable hiccups    Male erectile dysfunction, unspecified 02/26/2013    Organic impotence    Other instability, unspecified ankle 11/09/2019    Ankle instability    Other meniscus derangements, unspecified medial meniscus, unspecified knee 08/17/2018    Derangement of medial meniscus    Personal history of diseases of the skin and subcutaneous tissue 09/14/2019    History of dermatitis    Personal history of other diseases of the circulatory system     History of hypertension    Personal history of other diseases of the nervous system and sense organs     History of migraine with aura    Personal history of other diseases of the respiratory system     " Personal history of asthma    Personal history of other diseases of urinary system     History of bladder problems    Personal history of other endocrine, nutritional and metabolic disease     History of hyperlipidemia    Personal history of other endocrine, nutritional and metabolic disease 12/14/2016    History of goiter    Personal history of other mental and behavioral disorders     History of depression    Pure hypercholesterolemia, unspecified     High cholesterol    Sjogren syndrome, unspecified (Multi) 09/02/2015    History of Sjogren's disease    Sleep apnea, unspecified 04/27/2015    Sleep apnea    Snoring     Snoring     PSH:    Past Surgical History:   Procedure Laterality Date    KNEE SURGERY  01/12/2016    Knee Surgery    TONSILLECTOMY  02/08/2017    Tonsillectomy With Adenoidectomy    TONSILLECTOMY  09/02/2015    Tonsillectomy        Meds:   Current Outpatient Medications on File Prior to Visit   Medication Sig Dispense Refill    albuterol (ProAir HFA) 90 mcg/actuation inhaler Inhale 2 puffs every 4 hours if needed for wheezing or shortness of breath. 8.5 g 1    albuterol 2.5 mg /3 mL (0.083 %) nebulizer solution Inhale 3 mL (2.5 mg) every 6 hours if needed.      ALPRAZolam (Xanax) 2 mg tablet Take 1 tablet (2 mg) by mouth 4 times a day as needed.      azelastine (Astelin) 137 mcg (0.1 %) nasal spray Use in each nostril as directed      buPROPion (Wellbutrin) 75 mg tablet Take 2 tablets (150 mg) by mouth 2 times a day.      cetirizine (ZyrTEC) 10 mg tablet       cloNIDine (Catapres) 0.1 mg tablet Take 1 tablet (0.1 mg) by mouth 3 times a day.      cyclobenzaprine (Flexeril) 10 mg tablet TAKE 1 TABLET BY MOUTH TWICE A DAY 60 tablet 0    EPINEPHrine 0.3 mg/0.3 mL injection syringe Inject 0.3 mL (0.3 mg) into the muscle 1 time if needed for anaphylaxis for up to 1 dose. As directed 2 each 3    fluticasone (Flonase) 50 mcg/actuation nasal spray Shake gently. Before first use, prime pump. After use, clean  tip and replace cap.      fluticasone (Flovent) 220 mcg/actuation inhaler Inhale 2 puffs 2 times a day. Rinse mouth after use      gabapentin (Neurontin) 100 mg capsule Take 1 capsule (100 mg) by mouth 3 times a day.      gabapentin (Neurontin) 600 mg tablet TAKE 3 TABLETS BY MOUTH 3 TIMES DAILY AS DIRECTED 810 tablet 1    hydrOXYzine HCL (Atarax) 25 mg tablet Take 2 tablets (50 mg) by mouth once daily at bedtime.      hydrOXYzine pamoate (Vistaril) 25 mg capsule TAKE 3-4 CAPSULES BY MOUTH AT BEDTIME AS NEEDED      icosapent ethyL (Vascepa) 1 gram capsule Take 2 capsules (2 g) by mouth 2 times a day with meals. 120 capsule 2    lisinopril 20 mg tablet Take 1 tablet (20 mg) by mouth once daily. 90 tablet 1    lubiprostone (Amitiza) 24 mcg capsule Take 1 capsule (24 mcg) by mouth 2 times a day. With food 180 capsule 1    meloxicam (Mobic) 15 mg tablet TAKE 1 TABLET BY MOUTH EVERY DAY AS NEEDED 30 tablet 5    methylPREDNISolone (Medrol Dospak) 4 mg tablets Take as directed      mirtazapine (Remeron) 30 mg tablet Take 2 tablets (60 mg) by mouth once daily at bedtime.      multivitamin tablet Take 1 tablet by mouth once daily.      naloxone (Narcan) 4 mg/0.1 mL nasal spray Administer 1 spray (4 mg) into affected nostril(s). 1 actuation in one nostril x1, may repeat dose q2-3min until pt responsive or EMS arrives      omeprazole (PriLOSEC) 40 mg DR capsule Take 1 capsule (40 mg) by mouth once daily in the morning. Take before meals. Do not crush or chew. 90 capsule 1    oxyCODONE (Roxicodone) 10 mg immediate release tablet Take 1 tablet (10 mg) by mouth every 6 hours if needed for severe pain (7 - 10). 105 tablet 0    polyethylene glycol (Glycolax) 17 gram/dose powder Take 17 g by mouth once daily. X 2 weeks, then as needed      propranolol (Inderal) 10 mg tablet Take 1 tablet (10 mg) by mouth 2 times a day as needed (anxiety).      prucalopride (Motegrity) 2 mg tablet Take 1 tablet (2 mg) by mouth once daily.       TURMERIC ORAL Turmeric 500 MG Oral Capsule      ZINC ORAL Zinc 100 MG Oral Tablet       No current facility-administered medications on file prior to visit.

## 2024-07-08 NOTE — PROGRESS NOTES
Patient was reviewed and discussed with [facility] and/or orthopedic resident.  Patient was seen and evaluated in conjunction with [facility] and/or orthopedic resident. Findings and treatment plan were discussed and approved by myself, Dr. Buck.    On exam:  WD/WN thin male  A+O X3  NAD  No lymphedema  Inspection of both feet and ankles show symmetric arches.   5/5 strength in all 4 planes.   Sensation grossly intact to LT. diffuse dysesthesias.  No obvious swelling over either tarsal tunnel.  (-) Tinel's  Good pulses.   Stable anterior drawer.  No peroneal subluxation.    (-) Silverskold.     Neuromuscular ultrasound findings shows borderline abnormality left tibial nerve.  No comment on any mass.    Assessment: Bilateral foot and ankle neuropathic pain.  Possible tarsal tunnel/nerve impingement.    Plan: Discussed nonoperative and operative options in detail.   Risk and benefits discussed in detail. All questions answered today.  Recovery timeline and expectations discussed in detail.  Communicated with Dr. Yan.  Await findings of EMG to determine whether there is anything structural in objective about the tarsal tunnel to consider anything surgical.

## 2024-07-09 DIAGNOSIS — M35.01 SJOGREN'S SYNDROME WITH KERATOCONJUNCTIVITIS SICCA (MULTI): ICD-10-CM

## 2024-07-09 DIAGNOSIS — G62.89 SMALL FIBER POLYNEUROPATHY: ICD-10-CM

## 2024-07-10 RX ORDER — GABAPENTIN 600 MG/1
TABLET ORAL
Qty: 810 TABLET | Refills: 1 | Status: SHIPPED | OUTPATIENT
Start: 2024-07-10

## 2024-07-11 DIAGNOSIS — M35.06 SJOGREN'S SYNDROME WITH PERIPHERAL NERVOUS SYSTEM INVOLVEMENT (MULTI): ICD-10-CM

## 2024-07-11 RX ORDER — CYCLOBENZAPRINE HCL 10 MG
10 TABLET ORAL 2 TIMES DAILY
Qty: 60 TABLET | Refills: 0 | Status: SHIPPED | OUTPATIENT
Start: 2024-07-11

## 2024-07-11 RX ORDER — OXYCODONE HYDROCHLORIDE 10 MG/1
10 TABLET ORAL EVERY 6 HOURS PRN
Qty: 105 TABLET | Refills: 0 | Status: SHIPPED | OUTPATIENT
Start: 2024-07-12

## 2024-07-25 ENCOUNTER — APPOINTMENT (OUTPATIENT)
Dept: ALLERGY | Facility: CLINIC | Age: 44
End: 2024-07-25
Payer: MEDICARE

## 2024-07-25 ENCOUNTER — OFFICE VISIT (OUTPATIENT)
Dept: NEUROLOGY | Facility: HOSPITAL | Age: 44
End: 2024-07-25
Payer: MEDICARE

## 2024-07-25 ENCOUNTER — PROCEDURE VISIT (OUTPATIENT)
Dept: NEUROLOGY | Facility: HOSPITAL | Age: 44
End: 2024-07-25
Payer: MEDICARE

## 2024-07-25 ENCOUNTER — HOSPITAL ENCOUNTER (OUTPATIENT)
Dept: NEUROLOGY | Facility: HOSPITAL | Age: 44
Discharge: HOME | End: 2024-07-25
Payer: MEDICARE

## 2024-07-25 VITALS
SYSTOLIC BLOOD PRESSURE: 153 MMHG | HEART RATE: 54 BPM | RESPIRATION RATE: 16 BRPM | BODY MASS INDEX: 30.48 KG/M2 | DIASTOLIC BLOOD PRESSURE: 104 MMHG | WEIGHT: 225 LBS | HEIGHT: 72 IN

## 2024-07-25 DIAGNOSIS — G60.3 IDIOPATHIC PROGRESSIVE NEUROPATHY: ICD-10-CM

## 2024-07-25 DIAGNOSIS — G62.89 SMALL FIBER POLYNEUROPATHY: Primary | ICD-10-CM

## 2024-07-25 DIAGNOSIS — J30.89 ALLERGIC RHINITIS DUE TO OTHER ALLERGIC TRIGGER, UNSPECIFIED SEASONALITY: ICD-10-CM

## 2024-07-25 DIAGNOSIS — G60.0 HEREDITARY HYPERTROPHIC NEUROPATHY: ICD-10-CM

## 2024-07-25 DIAGNOSIS — G56.03 CARPAL TUNNEL SYNDROME, BILATERAL: ICD-10-CM

## 2024-07-25 DIAGNOSIS — G62.9 NEUROPATHY: Primary | ICD-10-CM

## 2024-07-25 PROCEDURE — 99215 OFFICE O/P EST HI 40 MIN: CPT | Performed by: PSYCHIATRY & NEUROLOGY

## 2024-07-25 PROCEDURE — 76882 US LMTD JT/FCL EVL NVASC XTR: CPT | Performed by: PSYCHIATRY & NEUROLOGY

## 2024-07-25 PROCEDURE — 95912 NRV CNDJ TEST 11-12 STUDIES: CPT | Performed by: PSYCHIATRY & NEUROLOGY

## 2024-07-25 PROCEDURE — 3008F BODY MASS INDEX DOCD: CPT | Performed by: PSYCHIATRY & NEUROLOGY

## 2024-07-25 PROCEDURE — 95912 NRV CNDJ TEST 11-12 STUDIES: CPT | Mod: 59 | Performed by: PSYCHIATRY & NEUROLOGY

## 2024-07-25 PROCEDURE — 95886 MUSC TEST DONE W/N TEST COMP: CPT | Mod: 59 | Performed by: PSYCHIATRY & NEUROLOGY

## 2024-07-25 PROCEDURE — 95886 MUSC TEST DONE W/N TEST COMP: CPT | Performed by: PSYCHIATRY & NEUROLOGY

## 2024-07-25 PROCEDURE — 76882 US LMTD JT/FCL EVL NVASC XTR: CPT | Mod: LT | Performed by: PSYCHIATRY & NEUROLOGY

## 2024-07-25 PROCEDURE — 76536 US EXAM OF HEAD AND NECK: CPT | Performed by: PSYCHIATRY & NEUROLOGY

## 2024-07-25 PROCEDURE — 76883 US NRV&ACC STRUX 1XTR COMPRE: CPT | Performed by: PSYCHIATRY & NEUROLOGY

## 2024-07-25 PROCEDURE — 3077F SYST BP >= 140 MM HG: CPT | Performed by: PSYCHIATRY & NEUROLOGY

## 2024-07-25 PROCEDURE — 3080F DIAST BP >= 90 MM HG: CPT | Performed by: PSYCHIATRY & NEUROLOGY

## 2024-07-25 RX ORDER — PAROXETINE HYDROCHLORIDE 20 MG/1
20 TABLET, FILM COATED ORAL EVERY MORNING
COMMUNITY

## 2024-07-25 ASSESSMENT — PAIN SCALES - GENERAL: PAINLEVEL: 7

## 2024-07-25 NOTE — PROGRESS NOTES
INTERIM HISTORY SINCE 3/28/24    44 yo RH AAM last seen 3/28/24. Generalized pian, paresthesias, ? Small fiber neuropathy in setting of Sjogren's (reportedly positive STEPHANY and SSB; negative anti-DS DNA, citrulline antibody, RF - I have not seen these lab results) with sicca symptoms, fibromyalgia, irritable bowel syndrome. Has had 3 previous EMGs: May, 2015 - mild R CTS , no large fiber peripheral neuropathy, cervical radiculopathy, or denervatimg myopathy; December, 2011 and August 2016 - no large fiber peripheral neuropathy or denervating myopathy or left cervical or lumbar radiculopathy. MUSCLE BIOPSY - R quadriceps - 1/20/12 - SKELETAL MUSCLE BIOPSY DEMONSTRATING PROMINENT ENDOMYSIAL AND PERIMYSIAL FIBROUS TISSUE. NO INFLAMMATION, VASCULITIS, MUSCLE FIBER DEGENERATION. MAY INDICATE NON-INFLAMMATORY MUSCLE INVOLVEMENT IN COLLAGEN VASCULAR DISEASES. Punch skin biopsy 2014 essentially normal, with some minor findings that may suggest small fiber neuropathy,     -Clinical presentation consistent with small fiber neuropathy, with superimposed bilateral CTS, and new sensory symptoms in lower extremities, including shooting pain in left lateral foot which appears to be in the sural distribution, and numbness of the right heel. Underwent Neuromuscular Ultrasound of left foot 6/18/22 showing ultrasound evidence of a mild left distal tibial neuropathy at the tarsal tunnel.    ROS:  - 15 - 20 pound weight gain in the past several weeks. Saw PCP. Sent to dietician - advised to change from plant based diet to more protein, but continues to gain weight. Has noted that legs are swollen lately.   - Very tired all the time, still wakes up feeling tired and short of breath. Also short of breath walking upstairs. Has been ascribed to his weight gain. Had mild MARY JO 5 years ago, repeat sleep study ordered. Has been referred to Cardiology.    - Pelvic floor muscles very tight - abnormal pelvic floor movement by testing. Has been doing  "pelvic floor rehabilitation.   - Constipation and bloating alternating with diarrhea - IBS - has tried various remedies. Followed by GI. Recent unremarkable colonoscopy and EGD. Brother has Chrohn's Disease.  GI symptoms persist.   - Dry eyes lately    The patient returned today for further Neuromuscular Ultrasound testing, EMG testing, and office visit, as more extensive testing was deemed necessary, given his continued symptoms that are not explained by his previous EMG studies, nor his previous Neuromuscular Ultrasound. Since last visit, has also had several blood tests to look for other possible causes of his pain and sensory symptoms. His HMGCoA antibodies are normal, which were checked to see if it's possible that some of his muscle problems/pain could be due to having been on lipitor in the past. His gene testing for the TTR gene (hereditary amyloid) is normal. His kappa/lambda ratio, SPEP/CR, and 24 hour urine protein/CR showed some very mild abnormalities, but to my reading there is nothing worrisome there, the mild changes could be due to his presumed autoimmune disease.      Neuromuscular Ultrasound done today: \"There was ultrasound evidence of a multifocal hypertrophic neuropathy with preferential involvement of entrapment sites.  All the underlying musculature appeared normal as did all the bony, vascular and ligamentous structures.  However there was marked nerve hypertrophy of both ulnar nerves at the retrocondylar groove.  There was mild nerve hypertrophy of both median nerves at the carpal tunnel.  However on the left the median nerve was also enlarged in the forearm segment.  On the left there was definite enlargement and fascicular abnormalities of the median nerve in the upper arm and near the axilla.     Similar to the previous study in June, there was definite evidence of tibial neuropathy at the left tarsal tunnel.  However there was no evidence of peroneal neuropathy at the fibular neck.  " "The sciatic nerve appeared normal.  The left sural nerve was studied throughout its anatomical course and was normal.     Lastly there was no definite evidence of nerve hypertrophy in the supraclavicular brachial plexus bilaterally.     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  Other causes of multifocal hypertrophic neuropathy cannot be excluded by this study.  However the lack of uniform nerve hypertrophy which strongly argue against an inherited demyelinating neuropathy such as Charcot-Anabel-Tooth.\"    EMG study done today did show evidence of a mild, right median neuropathy across the wrist, and a right ulnar neuropathy across the elbow. No generalized large fiber peripheral neuropathy was noted.     PROBLEMS:     Small fiber neuropathy - Currently on combination of Gabapentin 1900 mg tid after increasing from 1800 mg tid (had previously taken as much as 2100 mg tid, although thinks he may have been calculating the amount incorrectly at that time). Also takes alprazolam, Meloxicam, Oxycodone, Turmeric 500 mg bid for cramps, and at various times short courses of prednisone for symptomatic relief of pain and cramps. Advil prn. Narcan at home. also started Flexeril for pain and cramps, and stopped baclofen and tizanidine. Does not think the increased Gabapentin dose, nor the  addition of Flexeril, has helped the pain and extreme skin hypersensitivity. Describes excrutiating surface level pain in his calves and shins, especially if he's on his feet a lot. Feet feel tight and swollen, and red.       Treatment History: Pulse Solu-Medrol 1000 mg daily 3 days March, 2017, helped pain. Cymbalta, Lyrica, Topamax, Methotrexate - without pain relief. Imuran and CellCept ineffective. Unable to tolerate Cytoxan - nausea, fatigue. IVIG in past - " "1 dose every 3 weeks - back in 2017, 2018 was somewhat helpful, but discontinued and received 2 infusions of Rituxan August, 2018, which did not help symptoms. Pain flared after stopping IVIG. Restarted Methotrexate - injectable form, which he had not had in past, but not effective. Thus, has failed Imuran, CellCept, methotrexate, Cytoxan, ? IVIG (there is some documentation that it helped for a while in 2017, 2018), Rituxan, Cymbalta, topamax. High dose steroids caused side effects. Followed by rheumatology - does not feel would be helpful to retry these meds, some of which have already been retried, or to try any new meds.      Pain management in past - Dr. Fan and then Dr. Santana.  Sees psychiatrist, Dr. Eliazar Doty - outside of . Was planning to start him on Buspar, but was on back order.     - Bilateral CTS - wears bilateral wrist splints. Steroid injection R wrist March, 2017 helped for a while, but both hands felt worse at last visit. Also, had steroid injection R hand by rheumatology in the past for sharp pains at base of R index finger, for which steroid injection did help.      Saw Dr. Carl Marx, hand surgery, 9/19/23 Per Dr. Marx, \"Review of EMG study right upper extremity dated May 18, 2015 reveals evidence of mild right carpal tunnel syndrome. Impression: Bilateral carpal tunnel syndrome. Plan: He has never had any formal treatment or injections for the left side and he did well with injection on the right side in the past. He has not been offered repeat injections. He understands that surgery is likely his best option for definitive improvement of his symptoms but he is not interested in consideration of surgery at this point. He has requested injections into his carpal tunnels.\" Bilateral steroid injections done by Dr. Marx. He tolerated the injections well. Post injection instructions provided. Advised to return as needed for recurrence or progression of problems.     Initially relief " of symptoms in both hands for about a month, then recurred end of November, 2023.      - pain L big toe - foot doctor - Dr. Mansfield - took x-rays - recommended surgery L big toe for bunion and arthritis/inflamation in [facility], but patient declined surgery - not clear it would help. Wears splint on L big toe at night.      - pain and tingling on lateral side of left foot, especially if he everts his foot sideways or dorsiflexes his foot - feels a spike like pins and needles sensation down the lateral side of his foot. When he touches his foot, it feels normal. Saw his PCP for this. Had Tinel's tapping on posterior lateral malleolus - shooting pain down the lateral side of the foot. She suggested possible left tarsal tunnel syndrome, and suggested orthopedic referral. Also has recent complaint of right heel numbness. Neuromuscular Ultrasound left lower extremity 6/18/24 showed enlarged tibial nerve at tarsal tunnel, but unfortunately, the left sural nerve was not examined.  This would not explain his current complaints. Also notes right heel numbness.     - In addition, describes chronic back radiating with radiation down the legs.      - R shoulder pain - worsened by neck movements in past, now stable. Did PT. Also had shot in shoulder in the past. Also has R ankle pain. Not much help. Wears brace on R ankle.         Neuro Exam:  Mental status unremarkable to informal testing. History related in good detail with no obvious deficit of attention, memory, or language. Fund of knowledge adequate. Orientation intact to person, place, and time. Spontaneous speech fluent with no paraphasic or aphasic errors. Moves arm, hands, legs well - no muscle wasting appreciated. Full strength bilateral upper and lower extremities proximally and distally. Decreased pinprick to mid-shins and mid-forearms bilaterally. Deep tendon reflexes difficult to elicit in upper and lower extremities bilaterally. No fasciculations appreciated.        NEUROLOGICAL EXAM in past:  On neurologic examination today, mental status unremarkable to informal testing. He looks comfortable sitting in the chair. History related in quite good detail with no obvious deficit of attention, memory or language. Fund of knowledge adequate. Orientation intact to person, place and time. Spontaneous speech fluent with no paraphasic or aphasic errors. On cranial nerve exam, pupils are equal, round and reactive to light. Extraocular movements full, without nystagmus. There is no bulbofacial weakness or ptosis. Tongue midline with no wasting or fasciculations. Palate elevates symmetrically. Facial sensation intact to light touch and cold bilaterally. Neck flexion and extension full strength. Motor examination reveals normal tone and bulk in upper and lower extremities bilaterally. No fasciculations. Full strength proximal and distal muscles upper and lower extremities bilaterally. Deep tendon reflexes difficult to elicit throughout upper and lower extremities bilaterally, including ankle jerks.Sensory examination reveals decreased pin prick and cold in all digits bilaterally up to the forearms and decreased pinprick and cold in distal lower legs to shins. Vibration slightly decreased at ankles and fingertips. Gait slow, due to pain. Wearing a walking boot R leg. On functional testing, patient can arise from chair with some difficulty, which he says is due to pain and the fact that he has the boot on R leg.     Provider Impression/Plan:     IMPRESSION:  Diffuse pain and distal sensory loss to pinprick and cold on previous exams with numbness and tingling, with bilateral carpal tunnel syndrome, and bilateral ulnar neuropathies at the medial epicondyle, in setting of Sjogrens and fibromyalgia. Examination consistent with primarily SMALL FIBER SENSORY neuropathy, likely secondary to underlying autoimmune disorder, with superimposed bilateral carpal tunnel syndrome and ulnar neuropathies.  No large fiber peripheral neuropathy appreciated on EMG done today.  Muscle biopsy abnormal - consistent with NON-INFLAMMATORY MUSCLE DISORDER, as can be seen in collagen vascular disease (increased epimysial and perimysial fibrosis).     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  Other causes of multifocal hypertrophic neuropathy cannot be excluded by this study.  However the lack of uniform nerve hypertrophy which strongly argue against an inherited demyelinating neuropathy such as Charcot-Anabel-Tooth.       PLAN:     - I spoke extensively with patient and his wife, and explained the results of the Neuromuscular Ultrasound testing. Patient has agreed to genetic testing for HNPP, and possibly other related disorders. I am still not convinced that he does not have an acquired demyelinating neuropathy rather than a genetic neuropathy, but thus far there is no definite evidence of that on the EMG or the Neuromuscular Ultrasound study done today. Will arrange for genetic testing, and await results.     - Carpal tunnel syndrome/median neuropathy at the wrist - bilaterally - Bilateral steroid injections by Dr. Marx were temporarily helpful. I think he will likely need bilateral carpal tunnel release surgery, but I will await the results of the genetic testing, since it is not clear surgery will be helpful if he does indeed have HNPP.      - Ulnar neuropathies across the elbow - bilaterally - seen on Neuromuscular Ultrasound. For now, these appear to be asymptomatic.    - Left lateral foot pain - does not sound like Tarsal Tunnel Syndrome - symptoms sound more like irritation of the left sural nerve, although the sural nerve looked normal on today's ultrasound.      -  Small fiber neuropathy/diffuse pain -  He continues to be very disabled by pain and small fiber neuropathy symptoms. Multiple regimens including membrane stabilizers, immunosuppressants, ketamine infusion,and Rituxan have not been successful. IVIG may have been helpful, after a careful review of his records. I have spoken with his rheumatologist about possible other treatment options, with no clear alternative. Will await results of genetic testing before further addressing alternative treatment options.     He seems to be having many systemic issues without a clear etiology at the moment, including unexplained weight gain, shortness of breath, increased swelling in his legs, tightness of his pelvic floor muscles, and more GI symptoms. Gabapentin dose very high at the moment, and I do not want to go any higher on this until he has had further investigation of his other symptoms, which is in process. I explained to him that Gabapentin can cause swelling, although he has been on Gabapentin for a long time, without those side effects. I will follow his progress with his further evaluation.       - Continue rheumatological follow up.      I will be in touch with him regarding the genetic testing, and further follow up. All questions were addressed. He knows that he can call me as needed and let me know how he is doing. [phone].     Polina Yan M.D.,Ph.D.    --------------------------------------------------------------------------------------------------------------------------      History of Present Illness     Patient seen in follow up of his generalized pain and paresthesias, and presumed small fiber neuropathy.      Telehealth visit performed - originating site at PATIENT LOCATION of Home and distant site--PROVIDER LOCATION--in CLINIC.   - Verbal consent to participate in video visit obtained. This visit occurred during 2020 COVID19 pandemic. I discussed with patient nature of telehealth visits, that:   - I will evaluate patient and  recommend diagnostics and treatments based on my assessment   - Our sessions are not being recorded and personal health information is protected   - Our team will provide follow up care in person if/when needed.   - Benefits of avoiding travel during the time of this coronavirus reviewed with patient.     INTERIM HISTORY SINCE 8/3/23  42 yo RH AAM last seen 8/3/23. Generalized pian, paresthesias in setting of Sjogren's (reportedly positive STEPHANY and SSB; negative anti-DS DNA, citrulline antibody, RF - I have not seen these lab results) with sicca symptoms, fibromyalgia, irritable bowel syndrome. Previous EMG: mild R CTS, no large fiber peripheral neuropathy, cervical radiculopathy, or denervatimg myopathy. MUSCLE BIOPSY - R quadriceps - 1/20/12 - SKELETAL MUSCLE DEMONSTRATING PROMINENT ENDOMYSIAL AND PERIMYSIAL FIBROUS TISSUE. NO INFLAMMATION, VASCULITIS, MUSCLE FIBER DEGENERATION. MAY INDICATE NON-INFLAMMATORY MUSCLE INVOLVEMENT IN COLLAGEN VASCULAR DISEASES. Punch skin biopsy 2014 essentially normal, with some mintor findings that may suggest small fiber neuropathy,     -Clinical presentation consistent with small fiber neuropathy, with superimposed bilateral CTS.     Small fiber neuropathy - Currently on combination of Gabapentin 1900 mg tid after increasing from 1800 mg tid at last visit (had previously taken as much as 2100 mg tid, although thinks he may have been calculating the amount incorrectly at that time). Also takes alprazolam, Meloxicam, Oxycodone, Turmeric 500 mg bid for cramps, and at various times short courses of prednisone for symptomatic relief of pain and cramps. Advil prn. Narcan at home. At last visit with Rheuatology, started Flexeril for pain and cramps, and stopped baclofen and tizanidine. He does not think the increased Gabapentin dose at last visit, nor the recent addition of Flexeril, has helped the pain and extreme skin hypersensitivity. Describes excrutiating surface level pain in his  calves and shins, especially if he's on his feet a lot. Feet feel tight and swollen, and red.       Treatment History: Pulse Solu-Medrol 1000 mg daily 3 days March, 2017, helped pain. Cymbalta, Lyrica, Topamax, Methotrexate - without pain relief. Imuran and CellCept ineffective. Unable to tolerate Cytoxan - nausea, fatigue. IVIG in past - 1 dose every 3 weeks - back in 2017, 2018 was somewhat helpful, but discontinued and received 2 infusions of Rituxan August, 2018, which did not help symptoms. Pain flared after stopping IVIG. Restarted Methotrexate - injectable form, which he had not had in past, but not effective. Thus, has failed Imuran, CellCept, methotrexate, Cytoxan, ? IVIG (there is some documentation that it helped for a while in 2017, 2018), Rituxan, Cymbalta, topamax. High dose steroids caused side effects. Followed by rheumatology - does not feel would be helpful to retry these meds, some of which have already been retried, or to try any new meds.      Pain management in past - Dr. Fan and then Dr. Santana.  Sees psychiatrist, Dr. Eliazar Doty - outside of . Was planning to start him on Buspar, but was on back order.     ROS:  - 15 - 20 pound weight gain in the past few weeks. Saw PCP. Sent to dietician - advised to change from plant based diet to more protein, but continues to gain weight. Has noted that legs are swollen lately.   - Very tired all the time, wakes up feeling tired and short of breath. Also short of breath walking upstairs. Has been ascribed to his weight gain. Had mild MARY JO 5 years ago, repeat sleep study ordered. Has been referred to Cardiology.    - Pelvic floor muscles very tight - abnormal pelvic floor movement by testing. Has been doing pelvic floor rehabilitation.   - Constipation and bloating alternating with diarrhea - IBS - has tried various remedies. Followed by GI. Recent unremarkable colonoscopy and EGD. Brother has Chrohn's Disease.   - Dry eyes lately     - bilateral  "CTS - bilateral wrist splints. Steroid injection R wrist March, 2017 helped for a while, but both hands felt worse at last visit. Also, had steroid injection R hand by rheumatology in the past - was having sharp pains at base of R index finger, for which steroid injection did help.      Saw Dr. Carl Marx, hand surgery, 9/19/23 Per Dr. Marx, \"Review of EMG study right upper extremity dated May 18, 2015 reveals evidence of mild right carpal tunnel syndrome. Impression: Bilateral carpal tunnel syndrome. Plan: He has never had any formal treatment or injections for the left side and he did well with injection on the right side in the past. He has not been offered repeat injections. He understands that surgery is likely his best option for definitive improvement of his symptoms but he is not interested in consideration of surgery at this point. He has requested injections into his carpal tunnels.\" Bilateral steroid injections done by Dr. Marx. He tolerated the injections well. Post injection instructions provided. Advised to return as needed for recurrence or progression of problems.     Initially relief of symptoms in hands for about a month, then recurred end of November, 2023.      - pain L big toe - foot doctor - Dr. Mansfield - took x-rays - recommended surgery L big toe for bunion and arthritis/inflamation in [facility], but patient declined surgery - not clear it would help. Wears splint on L big toe at night.      - Now complains of pain and tingling on the lateral side of the left foot, especially if he everts his foot sideways or dorsiflexes his foot - feels a spike like pins and needles sensation down the lateral side of his foot. He says that when he touches his foot, it feels normal. Saw his PCP for this. Had Tinel's tapping on posterior lateral malleolus - shooting pain down the lateral side of the foot. She suggested possible tarsal tunnel syndrome, and suggested orthopedic referral.      - R shoulder " pain - worsened by neck movements in past, now stable. Did PT. Also had shot in shoulder in the past. Also has R ankle pain. Not much help. Wears brace on R ankle.         Neuro Exam:  Mental status unremarkable to informal testing. History related in good detail with no obvious deficit of attention, memory, or language. Fund of knowledge adequate. Orientation intact to person, place, and time. Spontaneous speech fluent with no paraphasic or aphasic errors. Moves arm, hands, legs well - no muscle wasting appreciated over video feed. No pitting edema appreciated when I asked patient to press in on his lower leg.      NEUROLOGICAL EXAM in past:  On neurologic examination today, mental status unremarkable to informal testing. He looks comfortable sitting in the chair. History related in quite good detail with no obvious deficit of attention, memory or language. Fund of knowledge adequate. Orientation intact to person, place and time. Spontaneous speech fluent with no paraphasic or aphasic errors. On cranial nerve exam, pupils are equal, round and reactive to light. Extraocular movements full, without nystagmus. There is no bulbofacial weakness or ptosis. Tongue midline with no wasting or fasciculations. Palate elevates symmetrically. Facial sensation intact to light touch and cold bilaterally. Neck flexion and extension full strength. Motor examination reveals normal tone and bulk in upper and lower extremities bilaterally. No fasciculations. Full strength proximal and distal muscles upper and lower extremities bilaterally. Deep tendon reflexes difficult to elicit throughout upper and lower extremities bilaterally, including ankle jerks.Sensory examination reveals decreased pin prick and cold in all digits bilaterally up to the forearms and decreased pinprick and cold in distal lower legs to shins. Vibration slightly decreased at ankles and fingertips. Gait slow, due to pain. Wearing a walking boot R leg. On functional  testing, patient can arise from chair with some difficulty, which he says is due to pain and the fact that he has the boot on R leg.     Provider Impression/Plan:     IMPRESSION:  Diffuse pain and distal sensory loss to pinprick and cold on previous exams with numbness and tingling, and probable bilateral carpal tunnel syndrome,in setting of Sjogrens and fibromyalgia - examination consistent with primarily SMALL FIBER SENSORY neuropathy, likely secondary to underlying autoimmune disorder, with superimposed bilateral carpal tunnel syndrome. Muscle biopsy abnormal - consistent with NON-INFLAMMATORY MUSCLE DISORDER, as can be seen in collagen vascular disease (increased epimysial and perimysial fibrosis).      PLAN:   - Carpal tunnel syndrome - bilaterally - Bilateral steroid injections by Dr. Marx were temporarily helpful. I think he will likely need bilateral carpal tunnel release surgery.       -  Small fiber neuropathy - He continues to be very disabled by pain and small fiber neuropathy symptoms. Multiple regimens including membrane stabilizers, immunosuppressants, ketamine infusion,and Rituxan have not been successful. IVIG may have been helpful, after a careful review of his records. He seems to be having many systemic issues without a clear etiology at the moment, including unexplained weight gain, shortness of breath, increased swelling in his legs, tightness of his pelvic floor muscles, and more GI symptoms. Gabapentin dose very high at the moment, and I do not want to go any higher on this until he has had further investigation of his other symptoms, which is in process. I explained to him that Gabapentin can cause swelling, although he has been on Gabapentin for a long time, without those side effects. I will follow his progress with his further evaluation, and be in touch with him regarding other lab studies I may want to obtain, including screening tests for amyloid., given his painful neuropathy,  bilateral carpal tunnel syndrome, GI symptoms, and other systemic issues.       - Left lateral foot pain -does not sound like Tarsal Tunnel Syndrome - symptoms sound more like irritation of the left sural nerve. I will order a Neuromuscular ultrasound of the left sural nerve, and evaluation for possible tarsal tunnel syndrome.      - Continue rheumatological follow up.      - I will be in touch with him after the Neuromuscular Ultrasound is completed, and after he has seen cardiology, and discuss further options at that time. After reviewing his records, I am wondering whether another course of IVIG may be helpful.      - Continue social distancing. He has increased risk factors, including having an autoimmune disorder, chronic asthma, and being .      He knows that he can call me as needed and let me know how he is doing. [phone].     Polina Yan M.D.,Ph.D.           Instructions    - Continue rheumatological follow up.      - I will be in touch with you after the Neuromuscular Ultrasound is completed, and after seeing cardiology, and discuss further options at that time. After reviewing your records, I am wondering whether another course of IVIG may be helpful.      - Continue social distancing. You have increased risk factors, including having an autoimmune disorder, chronic asthma, and being .      You can call me as needed and let me know how you are doing. [phone].            AVS - Outpatient (Automatic SnapShot taken 4/9/2024)  Additional Documentation    Vitals: Pain Sc   7   Flowsheets: Interfaced Flowsheet Data,     Patient Health Questionnaire-2/9,     Time Spent   SmartForms: GABBY CRUZ FALL RISK   Encounter Info: Billing Info,     History,     Allergies     Communications    View All Conversations on this Encounter    Chart Routed to Sherrie Rosas DO  Sent 4/9/2024  Media  From this encounter  Consent-General MyChart - Electronic signature on  3/28/2024 9:33 AM - E-signed

## 2024-07-25 NOTE — PROGRESS NOTES
NEUROMUSCULAR ULTRASOUND OF THE [RIGHT AND LEFT UPPER EXTREMITY]    INDICATION:  Clinical Information: Since 2011, history of neuropathic symptoms, mostly pain and paresthesias involving all 4 extremities, most recently involving the territory of the left sural nerve and bilateral carpal tunnel.  Has had a previous ultrasound which showed evidence of tibial nerve enlargement.  Evaluate for sural nerve abnormality on the left and evidence of acquired demyelinating polyneuropathy.    Neuromuscular ultrasound to be performed for evidence of hypertrophic neuropathy of the median nerves along their course in the arm evidence of hypertrophic neuropathy in the brachial plexus; And any evidence of hypertrophic or other abnormality of the left sural nerve.    HEIGHT: 6 ft 0 in  WEIGHT: 225 lbs.  HANDEDNESS: [Right]    COMPARISON:   [6/18/24.]    TECHNIQUE:  The bilateral median nerves and accompanying structures were studied throughout their entire anatomic course in the limb from the wrist to the axilla, including real-time cine imaging. The examination was performed using a [[employer]] ultrasound machine with a [15-6 MHz matrix linear transducer]. Both axial and longitudinal views were obtained. The patient was examined [supine with the arm extended and supinated]. Cross sectional area (CSA) was measured within the epineurium, using the trace method. At locations where repeat measurements were taken, the mean value is reported below. Transverse and longitudinal images were obtained. The right and left ulnar nerves and accompanying structures were studied throughout their entire anatomic course in the limb from the wrist to the axilla, including real-time cine imaging. For the ulnar nerves, the patient was placed supine with the arms externally rotated, abducted, and slightly flexed at the elbow. [With the elbow extended, the transducer was placed at the level of the medial epicondyle as the patient´s elbow was passively flexed  to determine if either ulnar nerve subluxed or dislocated out of the groove.]  The sural nerve was studied between the 2 heads of the gastrocnemius muscle and followed down to the ankle.  The sciatic nerve was visualized in the popliteal fossa and felt to where it bifurcated into the common peroneal and tibial nerves.  The supraclavicular brachial plexus was inspected bilaterally as well.    FINDINGS:  At the right wrist at the distal wrist crease (proximal carpal tunnel), the median nerve CSA was 16.3 mm2 (NL < 10 mm2; borderline 10-13 mm2; ABN > 13 mm2).    The echogenicity of the right median nerve at the wrist was [reduced]. [No abnormal] tenosynovitis of the right flexor tendons was noted.    Using a longitudinal scan of the right median nerve at the wrist, a notch sign was not seen under the flexor retinaculum.    A right persistent median artery [was not] present. A right bifid median nerve [was not] present. No other abnormal mass or ganglia was appreciated in the right carpal tunnel. With extension of the fingers, there was [no abnormal intrusion] of the right flexor digitorum sublimis. With flexion of the fingers, there was [no abnormal intrusion] of the right lumbrical muscles.    In the mid-forearm, approximately 12 cm proximal to the distal wrist crease, the right median nerve was seen between the flexor digitorum sublimis and flexor digitorum profundus muscles. At the mid-forearm, the right median nerve CSA was 15.1 mm2. The ratio of the right median CSAs between the wrist and forearm (WFR) was 1.1 (NL < 1.5; borderline: 1.5 - 2.0). The echogenicity of the right median nerve in the forearm was normal.    The right median nerve was then followed proximal into the forearm and then to the [antecubital fossa]. The median nerve was normal in size and echogenicity adjacent to the brachial artery.     Scanning the muscles, the echogenicity was normal of the flexor digitorum sublimis, flexor digitorum  profundus, flexor pollicis longus, flexor carpi radialis, and pronator teres. The echogenicity of the abductor pollicis brevis was [normal].    Attention was then turned to the ulnar nerve. At the wrist, the ulnar CSA was 8.3 mm2 (NL < 10 mm2). The echogenicity was [normal].    At the mid-forearm slightly proximal to the location where the ulnar artery  from the ulnar nerve, the CSA was 8.6 mm2 (NL < 10 mm2). The echogenicity was [normal].    At the level of retrocondylar groove, the ulnar nerve with the largest CSA was located between the medial epicondyle and olecranon. The CSA at this location was 29.1 mm2 (NL < 10 mm2; mild ABN 10-15 mm2; moderate ABN 15-20 mm2; severely ABN > 20 mm2). The echogenicity was [reduced].    Color Doppler of the ulnar nerve at the elbow showed normal nerve vascularity. No abnormal mass was appreciated affecting the ulnar nerve in the elbow. An anconeus epitrochlearis muscle [was not] appreciated at the elbow.     At the mid-arm under the fascia of the medial triceps, the CSA was 11.6 mm2 (NL < 10 mm2). The echogenicity was normal. The ulnar nerve was the followed to the axilla and was [normal].    The ratio of the largest CSAs between the elbow and mid-forearm was 3.4 (NL < 1.5).    The ratio of the largest CSAs between the elbow and mid-arm was 2.5 (NL < 1.5).    Scanning the right ulnar muscles, the echogenicity was [increased] in the [deep head of the flexor pollicis brevis, medial flexor digitorum profundus and flexor carpi ulnaris].    Attention was then turned to the supraclavicular brachial plexus.  The internal jugular, carotid artery and vagus nerves were well-seen.  3 trunks of the brachial plexus were difficult to measure but qualitatively appeared normal.  Of note the vagus nerve was very easy to see and was slightly enlarged.    At the left wrist at the distal wrist crease (proximal carpal tunnel), the median nerve CSA was 16.9mm2 (NL < 10 mm2; borderline 10-13  mm2; ABN > 13 mm2).    The echogenicity of the left median nerve at the wrist was [reduced]. Color Doppler of the left median nerve showed [normal] median nerve vascularity. [No abnormal] tenosynovitis of the left flexor tendons was noted].    Using a longitudinal scan of the left median nerve at the wrist, a notch sign was not seen under the flexor retinaculum.    A left persistent median artery [was not] present. A left bifid median nerve [was not] present. No other abnormal mass or ganglia was appreciated in the left carpal tunnel.     In the mid-forearm, approximately 12 cm proximal to the distal wrist crease, the left median nerve was seen between the flexor digitorum sublimis and flexor digitorum profundus muscles. At the mid-forearm, the left median nerve CSA was 10.9 mm2. The ratio of the left median CSAs between the wrist and forearm (WFR) was 1.5 (NL < 1.5; borderline: 1.5 - 2.0). The echogenicity of the left median nerve in the forearm was normal.    The left median nerve was then followed proximal into the forearm and then to the [axilla]. The median nerve was markedly increased in size.  In addition the fascicles were very hypoechoic and enlarged.    Scanning the muscles, the echogenicity was normal of the flexor digitorum sublimis, flexor digitorum profundus, flexor pollicis longus, flexor carpi radialis, and pronator teres. The echogenicity of the abductor pollicis brevis was [normal].    At the left wrist joint, the radial carpal joint was normal. No abnormal effusion was seen. The flexor carpi radialis tendon was normal. Both the radial and ulnar arteries were well seen and were normal.    Attention was then turned to the ulnar nerve.  At the mid-forearm slightly proximal to the location where the ulnar artery  from the ulnar nerve, the CSA was 9.7 mm2 (NL < 10 mm2). The echogenicity was [normal].    At the level of cubital tunnel, the ulnar nerve with the largest CSA was located in between  the two heads of the flexor carpi ulnaris. The CSA at this location was 8.9 mm2 (NL < 10 mm2; mild ABN 10-15 mm2; moderate ABN 15-20 mm2; severely ABN > 20 mm2). The echogenicity was [reduced].    At the level of retrocondylar groove, the ulnar nerve with the largest CSA was located between the medial epicondyle and olecranon. The CSA at this location was 33.1 mm2 (NL < 10 mm2; mild ABN 10-15 mm2; moderate ABN 15-20 mm2; severely ABN > 20 mm2). The echogenicity was [reduced].    Color Doppler of the ulnar nerve at the elbow showed normal nerve vascularity. No abnormal mass was appreciated affecting the ulnar nerve in the elbow. An anconeus epitrochlearis muscle [was not] appreciated at the elbow.     At the mid-arm under the fascia of the medial triceps, the CSA was 24.3 mm2 (NL < 10 mm2). The echogenicity was normal. The ulnar nerve was the followed to the axilla and was [normal].    Scanning the left ulnar muscles, the echogenicity was [normal] in the [deep head of the flexor pollicis brevis, medial flexor digitorum profundus and flexor carpi ulnaris].    Attention was then turned to the supraclavicular brachial plexus.  The internal jugular vein, carotid artery, vagus nerve and thyroid gland were well-seen and were normal.  3 trunks of the brachial plexus were seen in the interscalene groove and appeared to be normal.    In the left leg, the tibial nerve was seen at the level of the tarsal tunnel.  The fascicular structure appeared normal.  However the nerve was markedly enlarged at 30.8 mm².    Sciatic nerve was then visualized in the popliteal fossa and was normal at 52.3 mm².  The bifurcation into the common peroneal and tibial nerve were well-seen and were normal.  The peroneal nerve was followed around the fibular neck and was normal.    The sural nerve was then located between the 2 bellies of the gastrocnemius muscle and was normal.  It was followed to the lateral ankle and followed up to the popliteal  fossa and was normal throughout.  All the underlying musculature appeared normal.      IMPRESSION:  This is a severely abnormal and complex neuromuscular ultrasound examination of the nerves in the right and left upper extremities as well as a limited study of the nerves in the left lower extremity and a bilateral limited study of the neck (brachial plexus).    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  All the underlying musculature appeared normal as did all the bony, vascular and ligamentous structures.  However there was marked nerve hypertrophy of both ulnar nerves at the retrocondylar groove.  There was mild nerve hypertrophy of both median nerves at the carpal tunnel.  However on the left the median nerve was also enlarged in the forearm segment.  On the left there was definite enlargement and fascicular abnormalities of the median nerve in the upper arm and near the axilla.    Similar to the previous study in June, there was definite evidence of tibial neuropathy at the left tarsal tunnel.  However there was no evidence of peroneal neuropathy at the fibular neck.  The sciatic nerve appeared normal.  The sural nerve was studied throughout its anatomical course and was normal.    Lastly there was no definite evidence of nerve hypertrophy in the supraclavicular brachial plexus bilaterally.    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  Other causes of multifocal hypertrophic neuropathy cannot be excluded by this study.  However the lack of uniform nerve hypertrophy which strongly argue against an inherited demyelinating neuropathy such as Charcot-Anabel-Tooth.      Performed by: Allen Montiel MD  Authorized by: Allen Montiel MD

## 2024-08-01 ENCOUNTER — APPOINTMENT (OUTPATIENT)
Dept: ALLERGY | Facility: CLINIC | Age: 44
End: 2024-08-01
Payer: MEDICARE

## 2024-08-01 DIAGNOSIS — J30.89 ALLERGIC RHINITIS DUE TO OTHER ALLERGIC TRIGGER, UNSPECIFIED SEASONALITY: ICD-10-CM

## 2024-08-05 ENCOUNTER — TELEPHONE (OUTPATIENT)
Dept: GENETICS | Facility: CLINIC | Age: 44
End: 2024-08-05
Payer: MEDICARE

## 2024-08-07 ENCOUNTER — APPOINTMENT (OUTPATIENT)
Dept: PRIMARY CARE | Facility: CLINIC | Age: 44
End: 2024-08-07
Payer: MEDICARE

## 2024-08-08 ENCOUNTER — APPOINTMENT (OUTPATIENT)
Dept: ALLERGY | Facility: CLINIC | Age: 44
End: 2024-08-08
Payer: MEDICARE

## 2024-08-08 DIAGNOSIS — J30.89 ALLERGIC RHINITIS DUE TO OTHER ALLERGIC TRIGGER, UNSPECIFIED SEASONALITY: ICD-10-CM

## 2024-08-08 DIAGNOSIS — M35.01 SJOGREN'S SYNDROME WITH KERATOCONJUNCTIVITIS SICCA (MULTI): ICD-10-CM

## 2024-08-08 DIAGNOSIS — M35.01 SJOGREN SYNDROME WITH KERATOCONJUNCTIVITIS (MULTI): ICD-10-CM

## 2024-08-08 DIAGNOSIS — M35.06 SJOGREN'S SYNDROME WITH PERIPHERAL NERVOUS SYSTEM INVOLVEMENT (MULTI): ICD-10-CM

## 2024-08-08 PROCEDURE — 95117 IMMUNOTHERAPY INJECTIONS: CPT | Performed by: ALLERGY & IMMUNOLOGY

## 2024-08-08 RX ORDER — CYCLOBENZAPRINE HCL 10 MG
10 TABLET ORAL 2 TIMES DAILY
Qty: 180 TABLET | Refills: 3 | Status: SHIPPED | OUTPATIENT
Start: 2024-08-08 | End: 2025-08-08

## 2024-08-08 RX ORDER — METHYLPREDNISOLONE 4 MG/1
TABLET ORAL
Qty: 21 EACH | Refills: 5 | Status: SHIPPED | OUTPATIENT
Start: 2024-08-08

## 2024-08-08 RX ORDER — OXYCODONE HYDROCHLORIDE 10 MG/1
10 TABLET ORAL EVERY 6 HOURS PRN
Qty: 105 TABLET | Refills: 0 | Status: SHIPPED | OUTPATIENT
Start: 2024-08-08

## 2024-08-15 ENCOUNTER — OFFICE VISIT (OUTPATIENT)
Dept: GENETICS | Facility: CLINIC | Age: 44
End: 2024-08-15
Payer: MEDICARE

## 2024-08-15 ENCOUNTER — APPOINTMENT (OUTPATIENT)
Dept: ALLERGY | Facility: CLINIC | Age: 44
End: 2024-08-15
Payer: MEDICARE

## 2024-08-15 VITALS
HEART RATE: 53 BPM | HEIGHT: 71 IN | DIASTOLIC BLOOD PRESSURE: 74 MMHG | BODY MASS INDEX: 30.66 KG/M2 | WEIGHT: 219.03 LBS | SYSTOLIC BLOOD PRESSURE: 151 MMHG

## 2024-08-15 DIAGNOSIS — M35.00 SJOGREN'S SYNDROME, WITH UNSPECIFIED ORGAN INVOLVEMENT (MULTI): ICD-10-CM

## 2024-08-15 DIAGNOSIS — G56.01 CARPAL TUNNEL SYNDROME OF RIGHT WRIST: ICD-10-CM

## 2024-08-15 DIAGNOSIS — J30.89 ALLERGIC RHINITIS DUE TO OTHER ALLERGIC TRIGGER, UNSPECIFIED SEASONALITY: ICD-10-CM

## 2024-08-15 DIAGNOSIS — G64: Primary | ICD-10-CM

## 2024-08-15 DIAGNOSIS — G62.89 SMALL FIBER POLYNEUROPATHY: ICD-10-CM

## 2024-08-15 PROCEDURE — 3008F BODY MASS INDEX DOCD: CPT | Performed by: MEDICAL GENETICS

## 2024-08-15 PROCEDURE — 99214 OFFICE O/P EST MOD 30 MIN: CPT | Performed by: MEDICAL GENETICS

## 2024-08-15 PROCEDURE — 3078F DIAST BP <80 MM HG: CPT | Performed by: MEDICAL GENETICS

## 2024-08-15 PROCEDURE — 99204 OFFICE O/P NEW MOD 45 MIN: CPT | Performed by: MEDICAL GENETICS

## 2024-08-15 PROCEDURE — 3077F SYST BP >= 140 MM HG: CPT | Performed by: MEDICAL GENETICS

## 2024-08-15 PROCEDURE — 95117 IMMUNOTHERAPY INJECTIONS: CPT | Performed by: ALLERGY & IMMUNOLOGY

## 2024-08-15 ASSESSMENT — ENCOUNTER SYMPTOMS
NECK PAIN: 1
BACK PAIN: 1
MEMORY LOSS: 1
HEADACHES: 1
NEUROLOGIC COMPLAINT: 1
ALTERED MENTAL STATUS: 1
FATIGUE: 1
EYES NEGATIVE: 1
LIGHT-HEADEDNESS: 1
FOCAL WEAKNESS: 1
WEAKNESS: 1
FOCAL SENSORY LOSS: 1
SHORTNESS OF BREATH: 1
HEMATOLOGIC/LYMPHATIC NEGATIVE: 1
ABDOMINAL PAIN: 1

## 2024-08-15 ASSESSMENT — PAIN SCALES - GENERAL: PAINLEVEL: 6

## 2024-08-15 NOTE — PROGRESS NOTES
Subjective   Patient ID: Luis Condon Jr. is a 44 y.o. male who presents for a new patient visit.    Mr. 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 Per Dr. 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  Other causes of multifocal hypertrophic neuropathy cannot be excluded by this study.  However the lack of uniform nerve hypertrophy which strongly argue against an inherited demyelinating neuropathy such as Charcot-Anabel-Tooth.” Mr. Condon is here today for genetic evaluation, referred by Dr. Yan.  She is mostly concerned for HNPP, as above.    Per Dr. Yan, 7/25/24, “wears bilateral wrist splints. Steroid injection R wrist March, 2017 helped for a while, but both hands felt worse at last visit. Also, had steroid injection R hand by rheumatology in the past for sharp pains at base of R index finger, for which steroid injection did help.    Pain and tingling on lateral side of left foot, especially if he everts his foot sideways or dorsiflexes his foot - feels a spike like pins and needles sensation down the lateral side of his foot. When he touches his foot, it feels normal. Saw his PCP for this. Had Tinel's tapping on posterior lateral malleolus - shooting pain down the lateral side of the foot. She suggested possible left tarsal tunnel syndrome, and suggested orthopedic referral. Also has recent complaint of right heel numbness.” [He has numbness on the back of right foot and ankle, he notes.]       On interview today:    His symptoms began in 2011, ~ age 31. Previous to this he was in good shape (working  out, lifting weights). His first symptoms were fatigue and leg pain.     He has had migraines since high school. He also had vertigo in high school, but he currently only has episodes very occasionally.     He currently takes gabapentin for nerve pain, but he is not sure it is helpful.     He is scheduled to have surgery on right wrist for carpal tunnel.       Previous genetic testing (ordered by Dr. Yan):   5/5/2024 - [facility] Hereditary Transthyretin-mediated amyloidosis (hATTR amyloidosis) Test - Result: Negative      Imaging:   Per Dr. Yan, 7/25/24,   “EMGs:   May, 2015 - mild R CTS , no large fiber peripheral neuropathy, cervical radiculopathy, or denervatimg myopathy  December, 2011 and August 2016 - no large fiber peripheral neuropathy or denervating myopathy or left cervical or lumbar radiculopathy.  7/25/24 - mild, right median neuropathy across the wrist, and a right ulnar neuropathy across the elbow. No generalized large fiber peripheral neuropathy was noted.       MUSCLE BIOPSY - R quadriceps - 1/20/12 - SKELETAL MUSCLE BIOPSY DEMONSTRATING PROMINENT ENDOMYSIAL AND PERIMYSIAL FIBROUS TISSUE. NO INFLAMMATION, VASCULITIS, MUSCLE FIBER DEGENERATION. MAY INDICATE NON-INFLAMMATORY MUSCLE INVOLVEMENT IN COLLAGEN VASCULAR DISEASES.    Punch skin biopsy 2014 essentially normal, with some minor findings that may suggest small fiber neuropathy,     -Clinical presentation consistent with small fiber neuropathy, with superimposed bilateral CTS, and new sensory symptoms in lower extremities, including shooting pain in left lateral foot which appears to be in the sural distribution, and numbness of the right heel.  Underwent Neuromuscular Ultrasound of left foot 6/18/22 showing ultrasound evidence of a mild left distal tibial neuropathy at the tarsal tunnel.    Neuromuscular Ultrasound done today [7/25/24] [upper and lower extremities in this study]: There was ultrasound evidence of a multifocal hypertrophic  "neuropathy with preferential involvement of entrapment sites.     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 Allen Montiel:    \"IMPRESSION:  This is a severely abnormal and complex neuromuscular ultrasound examination of the nerves in the right and left upper extremities as well as a limited study of the nerves in the left lower extremity and a bilateral limited study of the neck (brachial plexus).     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  All the underlying musculature appeared normal as did all the bony, vascular and ligamentous structures.  However there was marked nerve hypertrophy of both ulnar nerves at the retrocondylar groove.  There was mild nerve hypertrophy of both median nerves at the carpal tunnel.  However on the left the median nerve was also enlarged in the forearm segment.  On the left there was definite enlargement and fascicular abnormalities of the median nerve in the upper arm and near the axilla.     Similar to the previous study in June, there was definite evidence of tibial neuropathy at the left tarsal tunnel.  However there was no evidence of peroneal neuropathy at the fibular neck.  The sciatic nerve appeared normal.  The sural nerve was studied throughout its anatomical course and was normal.     Lastly there was no definite evidence of nerve hypertrophy in the supraclavicular brachial plexus bilaterally.     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  Other causes of multifocal hypertrophic neuropathy " "cannot be excluded by this study.  However the lack of uniform nerve hypertrophy which strongly argue against an inherited demyelinating neuropathy such as Charcot-Anabel-Tooth.\"       Family History:   No known family history of neuropathy, although paternal grandmother had carpal tunnel.  Mother:  in mid-40s, suddenly, suspect coronary artery disease  Father (~76 yr): alive, not in contact  Maternal half-brother:  in early 40s due to accident, had Crohn's disease  Maternal half-brother (~54 yr): Crohn's disease  Son (20 yr): asthma/allergies  Daughter (15 yr): asthma/allergies  Paternal Grandmother: , elderly, had dementia, asthma, carpal tunnel, allergies  Paternal Grandfather: , elderly, lung cancer  Maternal Grandmother (~80s): Heart issues, has a stent, shingles   Maternal Grandfather:  in 70s    Social History: He has been on Disability for 6-7 years. He previously worked for a large [employer] company. 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     Specialist Evaluations:  Neurology - Polina Yan MD PhD; 24:  “PLAN:   - Carpal tunnel syndrome - bilaterally - Bilateral steroid injections by Dr. Marx were temporarily helpful. I think he will likely need bilateral carpal tunnel release surgery.     -  Small fiber neuropathy - He continues to be very disabled by pain and small fiber neuropathy symptoms. Multiple regimens including membrane stabilizers, immunosuppressants, ketamine infusion,and Rituxan have not been successful. IVIG may have been helpful, after a careful review of his records. He seems to be having many systemic issues without a clear etiology at the moment, including unexplained weight gain, shortness of breath, increased swelling in his legs, tightness of his pelvic floor muscles, and more GI symptoms. Gabapentin dose very high at the moment, and I do not want to go any higher on this " "until he has had further investigation of his other symptoms, which is in process. I explained to him that Gabapentin can cause swelling, although he has been on Gabapentin for a long time, without those side effects. I will follow his progress with his further evaluation, and be in touch with him regarding other lab studies I may want to obtain, including screening tests for amyloid., given his painful neuropathy, bilateral carpal tunnel syndrome, GI symptoms, and other systemic issues.    - Left lateral foot pain -does not sound like Tarsal Tunnel Syndrome - symptoms sound more like irritation of the left sural nerve. I will order a Neuromuscular ultrasound of the left sural nerve, and evaluation for possible tarsal tunnel syndrome.   - Continue rheumatological follow up.   - I will be in touch with him after the Neuromuscular Ultrasound is completed, and after he has seen cardiology, and discuss further options at that time. After reviewing his records, I am wondering whether another course of IVIG may be helpful.”    Orthopedic Surgery - Tristian Garces MD; 7/8/24:  “Reason for Consult: Bilateral foot numbness   42 yo M with numerous comorbidities including polyneuropathy presents for evaluation of bilateral foot numbness with concern for possible left tarsal tunnel syndrome, referred by Dr. Yan. He has noticed left lateral foot radiating pain and pins/needles sensation since January 2024. It is worse with movements like ankle plantarflexion and inversion but can occur anytime. He also endorses right posterior ankle/foot \"dull\" feeling/numbness with walking and going up/down stairs that has been present since March 2024. He sees a neurologist and takes gabapentin. He had a recent neuromuscular ultrasound concerning for possible left tarsal tunnel syndrome, however he has not had a recent EMG. He denies other treatments. He has tripped a few times but the decreased sensation in his feet has not caused him to " fall.”    Allergy/Immunology - Deejay Garcia MD; 6/27/24:  “Adverse effect of other vaccines and biological substances, initial encounter  Allergic rhinitis due to animal (cat) (dog) hair and dander  Non-seasonal allergic rhinitis, unspecified trigger  -     EPINEPHrine 0.3 mg/0.3 mL injection syringe; Inject 0.3 mL (0.3 mg) into the muscle 1 time if needed for anaphylaxis for up to 1 dose. As directed  Chronic allergic conjunctivitis  -     EPINEPHrine 0.3 mg/0.3 mL injection syringe; Inject 0.3 mL (0.3 mg) into the muscle 1 time if needed for anaphylaxis for up to 1 dose. As directed”     Rheumatology - Christina Ely MD; 5/30/24:  “Patient with +STEPHANY +SSB with sicca symptoms elevated cpks with normal muscle biopsy, episcleritis. Patient says he has been diagnosed with small fiber neuropathy seen on skin biopsy. Review of biopsy report does not fully support small fiber neuropathy but does comment that the epidermal nerve fiber densities are in the lower normal range with some axonal swelling at the distal leg. Has been evaluated by Dr. Yan (neurology) who feels that his symptoms are most likely due to small fiber neuropathy .           -He did have a recent blood test that showed negative HMGCoA antibodies and negative amyloid panel.          -Increase his and his gabapentin have been unhelpful     Sjogren's.- Has been on azathioprine, CellCept, methotrexate, Cytoxan, IVIG, Rituxan without any help in his symptoms. He had high dose steroids and had side effects to this. We will do blood work but uncertain what else we can do for his symptoms. We have retried methotrexate and azathioprine. Patient is wondering is there anything else that we can try and we had tried several options including Rituxan and IVIG. I do not feel that this is worth retrying again.      He has had some issues obtaining his oxycodone.  Currently on combination with gabapentin, alprazolam, baclofen,.  Patient understands risk  of increases sedation.  I am hesitant about increasing his medicines any further since he is on a combination of medications that can cause increase in sedation.  Has been seen by pain management in the past and has had ketamine infusions   Left medial epicondylitis.  Discussed conservative measures  In 3 months or as needed.”      Cardiology - Caio Aguilar MD; 5/29/24:  “Luis Condon Jr. is a 43 y.o. male with history of hypertension and mixed dyslipidemia who is referred for chest pain and shortness of breath.  His symptoms are atypical for cardiovascular etiology although he does have several risk factors and increases risk for coronary artery disease or heart failure.  Therefore further evaluation is warranted with an exercise treadmill stress test, an echocardiogram, and a coronary artery calcium score.  He does have mixed dyslipidemia he was recently started on icosapent ethyl by his primary care physician.  His blood pressure is essentially at goal today on his current therapy of an ACE inhibitor. Once these tests are completed I will contact him with results and further recommendations.  Please do not hesitate to call or contact me with any questions or concerns.”  [Testing was normal, per patient]    Review of Systems   Constitutional:  Positive for fatigue.   HENT: Negative.     Eyes: Negative.    Respiratory:          Asthma    Cardiovascular:  Positive for chest pain.   Genitourinary: Negative.    Musculoskeletal:         Generalized pain and paresthesias   Neurological:         Migraines   Occasional vertigo   Hematological: Negative.    Psychiatric/Behavioral:          Depression, Anxiety      Answers submitted by the patient for this visit:  Neurological Problem Questionnaire (Submitted on 8/15/2024)  Chief Complaint: Neurologic complaint  altered mental status: Yes  focal sensory loss: Yes  memory loss: Yes  focal weakness: Yes  Chronicity: recurrent  Onset: more than 1 year ago  Onset quality:  "insidiously  Progression since onset: gradually worsening  Focality: no focality noted  Treatments tried: medication  Improvement on treatment: no relief    Objective   Physical Exam:  Dr. Yan's Neuro Exam from 7/25/24:  \"Mental status unremarkable to informal testing. History related in good detail with no obvious deficit of attention, memory, or language. Fund of knowledge adequate. Orientation intact to person, place, and time. Spontaneous speech fluent with no paraphasic or aphasic errors. Moves arm, hands, legs well - no muscle wasting appreciated. Full strength bilateral upper and lower extremities proximally and distally. Decreased pinprick to mid-shins and mid-forearms bilaterally. Deep tendon reflexes difficult to elicit in upper and lower extremities bilaterally. No fasciculations appreciated.    NEUROLOGICAL EXAM in past:  On neurologic examination today, mental status unremarkable to informal testing. He looks comfortable sitting in the chair. History related in quite good detail with no obvious deficit of attention, memory or language. Fund of knowledge adequate. Orientation intact to person, place and time. Spontaneous speech fluent with no paraphasic or aphasic errors. On cranial nerve exam, pupils are equal, round and reactive to light. Extraocular movements full, without nystagmus. There is no bulbofacial weakness or ptosis. Tongue midline with no wasting or fasciculations. Palate elevates symmetrically. Facial sensation intact to light touch and cold bilaterally. Neck flexion and extension full strength. Motor examination reveals normal tone and bulk in upper and lower extremities bilaterally. No fasciculations. Full strength proximal and distal muscles upper and lower extremities bilaterally. Deep tendon reflexes difficult to elicit throughout upper and lower extremities bilaterally, including ankle jerks.Sensory examination reveals decreased pin prick and cold in all digits bilaterally up to " "the forearms and decreased pinprick and cold in distal lower legs to shins. Vibration slightly decreased at ankles and fingertips. Gait slow, due to pain. Wearing a walking boot R leg. On functional testing, patient can arise from chair with some difficulty, which he says is due to pain and the fact that he has the boot on R leg.\"    Assessment/Plan     It was a pleasure to meet you today.     Dr. Yan is concerned you may have HNPP, based on your neuromuscular ultrasound.    As noted by [employer]: “Hereditary neuropathy with liability to pressure palsies is a disorder that affects peripheral nerves. These nerves connect the brain and spinal cord to muscles and sensory cells that detect touch, pain, and temperature. In people with this disorder, the peripheral nerves are unusually sensitive to pressure, such as the pressure that occurs when carrying heavy grocery bags, leaning on an elbow, or sitting without changing position, particularly with crossed legs. These activities would not normally cause sensation problems in people without the disorder.    Hereditary neuropathy with liability to pressure palsies is characterized by recurrent episodes of numbness, tingling, and loss of muscle function (palsy) in the region associated with the affected nerve, usually an arm, hand, leg, or foot. An episode can last from several minutes to several months, but recovery is usually complete. Repeated incidents, however, can cause permanent muscle weakness or loss of sensation. This disorder is also associated with pain in the limbs, especially the hands.    A pressure palsy episode results from pressure on a single nerve, and any peripheral nerve can be affected. Although episodes often recur, they can affect different nerves. The most common problem sites involve nerves in the wrists, elbows, and knees. The fingers, shoulders, hands, feet, and scalp can also be affected. Many people with this disorder experience " carpal tunnel syndrome, which occurs when a nerve in the wrist (the median nerve) is involved. Carpal tunnel syndrome is characterized by numbness, tingling, and weakness in the hand and fingers. An episode in the hand may affect fine motor activities such as writing, opening jars, and fastening buttons. An episode of nerve compression in the knee can lead to a condition called foot drop, which makes walking, climbing stairs, or driving difficult or impossible.    The symptoms of hereditary neuropathy with liability to pressure palsies usually begin during adolescence or early adulthood but may develop anytime from childhood to late adulthood. Symptoms vary in severity; many people never realize they have the disorder, while some people experience prolonged disability. Hereditary neuropathy with liability to pressure palsies does not affect life expectancy.”    There has also been some concern for small fiber neuropathy for you.  Rarely, we are able to identify a genetic cause for this.  Most the time, we are not able to do so.  Sjogren's can also cause small fiber neuropathy.  There is no genetic testing for Sjogren's, as it is not caused by a change in just 1 gene.    A testing company called [facility] offers HNPP testing (tests the PNP22 gene -- 80% of the time 1 copy of this gene is missing when a person has HNPP, and 20% of the time, 1 copy of this gene has a spelling change instead) and will provide a cost estimate in advance of testing.  A normal result on this test would make HNPP extremely unlikely for you.    They also offer a larger test called the Hereditary Neuropathy Panel that includes HNPP plus other neuropathies, including a few small fiber neuropathy genes, but this is likely to be more costly.  This type of test is also prone to inconclusive results in additional genes outside the ones of greatest interest.  The results of a genetic test can be positive (abnormal), negative (normal), or inconclusive.  "      The SCN9A and SCN11A genes related to small fiber neuropathy are on this neuropathy panel.  (These genes do not have significant cross-over involvement in heart rhythm, unlike the closely related SCN10A small fiber neuropathy gene, which is rare and not on this panel.)    Finally, you likely do not qualify for a free sponsored test called “Hereditary ATTR (hATTR) Amyloidosis Program,\" because you already had the TTR gene checked with negative results.      If your test is positive for HNPP, you will receive advice about how to minimize the risk of further damage to your nerves.  There is no specific treatment at this time.  Likewise, there is no specific treatment for SCN9A and SCN11A other than medications for neuropathy.   (Sometimes low doses of antiseizure medications are helpful for certain genetic forms of small fiber neuropathy.)  In the future, new treatments specific to particular genes could become available. Learning that you have a gene change would also allow us to know the chance that your children could inherit your neuropathy.        Plan:  Before testing, my office will complete a benefit investigation for a price estimate for each of the two [facility] tests above (HNPP vs. larger neuropathy panel) and the results of this will be sent to you as a message in [employer]. After you review the benefit investigation results, please let me know which test you would like to pursue and a blood draw order will be placed.  If you have not heard from me by 8/30/24 with an update, please contact me through [employer] or by calling my office.     I will ask Dr. Yan if there are any other conditions on the comprehensive neuropathy panel that she can think of to account for the hypertrophy (thickening) of nerves, besides the gene for HNPP, in the context of your overall health.  I will confirm that she does not have any other specific genetic concerns, in the case of a negative test.     Virtual follow up visit " to be scheduled if results are positive or complicated. If negative, you will be notified by phone.      Your test results may be released to you when they are reported.   If you choose to view your results in advance of being contacted by our office, your provider may not be available to discuss. Most people choose to review results after being contacted or with the provider after the visit.       Cc Dr. Yan, Dr. Sherrie Rosas, and Dr. Christina Ely     If you have any questions, please call Emmy Martinez in the [facility] at [phone] option 1 or at her direct number [phone] or send me a non-urgent message through [employer].     Adriana Leon MD  Clinical     Visit Time: 11:38 - 12:25    Documentation Time: 2:37 - 2:42    Visit prep today: 11:32 - 11:37    Scribe Attestation  By signing my name below, I, Falguni Joseluis , Scribe   attest that this documentation has been prepared under the direction and in the presence of Adriana Leon MD.

## 2024-08-15 NOTE — LETTER
08/16/24    Sherrie Rosas DO  [address]  [facility] [address]      Dear Dr. Sherrie Rosas DO,    I am writing to confirm that your patient, Luis Condon Jr.  received care in my office on 08/16/24. I have enclosed a summary of the care provided to Luis for your reference.    Please contact me with any questions you may have regarding the visit.    Sincerely,         Adriana Leon MD  [address]  [facility] [id number]    CC: No Recipients

## 2024-08-16 DIAGNOSIS — K21.9 GASTROESOPHAGEAL REFLUX DISEASE WITHOUT ESOPHAGITIS: ICD-10-CM

## 2024-08-16 DIAGNOSIS — K59.09 CHRONIC CONSTIPATION: ICD-10-CM

## 2024-08-16 DIAGNOSIS — R73.03 PREDIABETES: ICD-10-CM

## 2024-08-16 DIAGNOSIS — E78.5 HYPERLIPIDEMIA, UNSPECIFIED HYPERLIPIDEMIA TYPE: ICD-10-CM

## 2024-08-16 DIAGNOSIS — I10 ESSENTIAL HYPERTENSION: ICD-10-CM

## 2024-08-19 RX ORDER — ICOSAPENT ETHYL 1000 MG/1
CAPSULE ORAL
Qty: 120 CAPSULE | Refills: 2 | OUTPATIENT
Start: 2024-08-19

## 2024-08-19 RX ORDER — OMEPRAZOLE 40 MG/1
40 CAPSULE, DELAYED RELEASE ORAL
Qty: 90 CAPSULE | Refills: 1 | OUTPATIENT
Start: 2024-08-19 | End: 2025-02-15

## 2024-08-19 RX ORDER — LISINOPRIL 20 MG/1
20 TABLET ORAL DAILY
Qty: 90 TABLET | Refills: 1 | OUTPATIENT
Start: 2024-08-19

## 2024-08-19 RX ORDER — LUBIPROSTONE 24 UG/1
24 CAPSULE ORAL 2 TIMES DAILY
Qty: 180 CAPSULE | Refills: 1 | OUTPATIENT
Start: 2024-08-19 | End: 2025-02-15

## 2024-08-27 ENCOUNTER — DOCUMENTATION (OUTPATIENT)
Dept: GENETICS | Facility: CLINIC | Age: 44
End: 2024-08-27
Payer: MEDICARE

## 2024-08-27 NOTE — PROGRESS NOTES
After consideration of testing options, I recommend [facility] Hereditary Neuropathy Panel for Mr. Condon.  The [facility] PNP22 deletion/duplication test would identify only 80% of cases of Hereditary Neuropathy with Liability to Pressure Palsies (HNPP), the condition most highly suspected for Mr. Condon on the basis of his neuromuscular ultrasound findings.    The Hereditary Neuropathy Panel at [facility] includes sequencing and deletion/duplication analysis of genes causing neuropathy.  It would sequence the PMP22 gene (gene associated with HNPP) in addition to looking for deletions, detecting almost all cases.  It would also be able to assess for causes of small fiber neuropathy, which he also has.  It would avoid the need to order multiple genetic tests to assess these possibilities.    Adriana Leon MD  Clinical

## 2024-08-29 ENCOUNTER — OFFICE VISIT (OUTPATIENT)
Dept: RHEUMATOLOGY | Facility: CLINIC | Age: 44
End: 2024-08-29
Payer: MEDICARE

## 2024-08-29 ENCOUNTER — APPOINTMENT (OUTPATIENT)
Dept: PRIMARY CARE | Facility: CLINIC | Age: 44
End: 2024-08-29
Payer: MEDICARE

## 2024-08-29 VITALS
BODY MASS INDEX: 30.96 KG/M2 | DIASTOLIC BLOOD PRESSURE: 95 MMHG | HEIGHT: 72 IN | WEIGHT: 228.6 LBS | SYSTOLIC BLOOD PRESSURE: 145 MMHG | OXYGEN SATURATION: 98 % | HEART RATE: 66 BPM

## 2024-08-29 DIAGNOSIS — M79.2 NEUROPATHIC PAIN: ICD-10-CM

## 2024-08-29 DIAGNOSIS — M79.10 MYALGIA: ICD-10-CM

## 2024-08-29 DIAGNOSIS — R52 GENERALIZED PAIN: ICD-10-CM

## 2024-08-29 DIAGNOSIS — Z79.891 ENCOUNTER FOR LONG-TERM OPIATE ANALGESIC USE: ICD-10-CM

## 2024-08-29 DIAGNOSIS — M35.06 SJOGREN'S SYNDROME WITH PERIPHERAL NERVOUS SYSTEM INVOLVEMENT (MULTI): Primary | ICD-10-CM

## 2024-08-29 PROCEDURE — 3008F BODY MASS INDEX DOCD: CPT | Performed by: INTERNAL MEDICINE

## 2024-08-29 PROCEDURE — 3080F DIAST BP >= 90 MM HG: CPT | Performed by: INTERNAL MEDICINE

## 2024-08-29 PROCEDURE — 99214 OFFICE O/P EST MOD 30 MIN: CPT | Performed by: INTERNAL MEDICINE

## 2024-08-29 PROCEDURE — 3077F SYST BP >= 140 MM HG: CPT | Performed by: INTERNAL MEDICINE

## 2024-08-29 RX ORDER — OXYCODONE HYDROCHLORIDE 10 MG/1
10 TABLET ORAL EVERY 6 HOURS PRN
Qty: 115 TABLET | Refills: 0 | Status: SHIPPED | OUTPATIENT
Start: 2024-08-29

## 2024-08-29 ASSESSMENT — ENCOUNTER SYMPTOMS
MYALGIAS: 1
FATIGUE: 1
BACK PAIN: 1
JOINT SWELLING: 1
NECK STIFFNESS: 1
ARTHRALGIAS: 1
NECK PAIN: 1
CONSTIPATION: 1
DIARRHEA: 1

## 2024-08-29 NOTE — PROGRESS NOTES
Subjective   Patient ID: Luis Condon Jr. is a 44 y.o. male who presents for Follow-up (3 mo f uv).  HPI  Patient with Sjogren's with positive STEPHANY and SSB with sicca symptoms, elevated CPK with normal muscle biopsy, episcleritis, possible small fiber neuropathy on skin biopsy.  Previous medications have included azathioprine, CellCept, methotrexate, Cytoxan, IVIG, Rituxan, high-dose steroids.    Patient was last seen in May.  At that time we had him continue with his cyclobenzaprine, oxycodone 10 mg every 6 hours as needed, meloxicam.  He has been getting his gabapentin through neurology.    His neurologist, Dr. Polina Yan, embarked on further workup for his symptoms.  He had done further lab work as well as a new neuromuscular ultrasound.  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  Has been seen by genetics to be evaluated for possible diagnosis of hereditary neuropathy with liability to pressure palsy.  Even if this is diagnosed there is no treatment for this.    He had been in the process of getting carpal tunnel surgery which is scheduled for October but now this may be on hold as doing the surgery may not help with his symptoms.    He has been in a lot of chronic pain and has difficulty sleeping.    He has been having more pain in his feet.  If he moves his left ankle a certain way he may get numbness and has been told it may be tarsal tunnel.  Also has pain in his right heel if he steps on it.    Has been having a lot of issues with GI issues.  Has a lot of constipation and bloating.    Review of Systems   Constitutional:  Positive for fatigue.   Eyes:         Dry eye   Gastrointestinal:  Positive for constipation and diarrhea.   Musculoskeletal:  Positive for arthralgias, back pain, joint swelling, myalgias, neck pain and neck stiffness.   Psychiatric/Behavioral:          Depression       Objective   Physical Exam  GEN:  NAD A&O x3 appropriate affect   EYES:  Wearing a brace on his right wrist   No current swelling in the hands elbows mild guarding with range of motion of the right shoulder has a brace on the right foot no swelling in the knees  No rashes seen on exposed skin   Tenderness in the left medial epicondyles  Assessment/Plan     Patient with +STEPHANY +SSB with sicca symptoms elevated cpks with normal muscle biopsy, episcleritis. Patient says he has been diagnosed with small fiber neuropathy seen on skin biopsy. Review of biopsy report does not fully support small fiber neuropathy but does comment that the epidermal nerve fiber densities are in the lower normal range with some axonal swelling at the distal leg. Has been evaluated by Dr. Yan (neurology) who has done a new workup and he could possibly have hereditary neuropathy with liability to pressure palsy.  He is still waiting for genetics to see if he can do testing with his insurance.   -Increase his and his gabapentin have been unhelpful   -Currently on hold for his carpal tunnel release as this procedure may not be helpful if he does have above condition.     Sjogren's.- Has been on azathioprine, CellCept, methotrexate, Cytoxan, IVIG, Rituxan without any help in his symptoms. He had high dose steroids and had side effects to this. We will do blood work but uncertain what else we can do for his symptoms. We have retried methotrexate and azathioprine. Patient is wondering is there anything else that we can try and we had tried several options including Rituxan and IVIG. I do not feel that this is worth retrying again.  Dr. Yan is considering possibly doing IVIG again.     Has chronic pain.  Previously has seen pain management, Dr. Keagan Fan.  I have taken over his oxycodone prescription.  He is on combination of gabapentin, alprazolam, muscle relaxants and I am hesitant about increasing his medications further.  He also has had GI slowing which increasing  narcotics could contribute more to.   Discussed about revisiting ketamine infusions which helped a little bit.       In 3 months or as needed.     [place]ARRS:  8/29/2024.  Last fill date 08/12/24  I have personally reviewed the OARRS report for Luis ALLISON Condon Jr.. I have considered the risks of abuse, dependence, addiction and diversion    Is the patient prescribed a combination of a benzodiazepine and opioid?  Yes, I feel it is clincially indicated to continue the medication and have discussed with the patient risks/benefits/alternatives.    Last Urine Drug Screen / ordered today: Yes  No results found for this or any previous visit (from the past 8760 hour(s)).    Results are as expected.         Controlled Substance Agreement:  Date of the Last Agreement: 02/29/24  Reviewed Controlled Substance Agreement including but not limited to the benefits, risks, and alternatives to treatment with a Controlled Substance medication(s).    Opioids:  What is the patient's goal of therapy?help with pain  Is this being achieved with current treatment? some    I have calculated the patient's Morphine Dose Equivalent (MED):     I feel that it is clinically indicated to continue this current medication regimen after consideration of alternative therapies, and other non-opioid treatment.    Opioid Risk Screening:  No data recorded    Pain Assessment:  No data recorded  Christina Ely MD 08/29/24 9:43 AM

## 2024-09-03 ENCOUNTER — LAB (OUTPATIENT)
Dept: LAB | Facility: LAB | Age: 44
End: 2024-09-03
Payer: MEDICARE

## 2024-09-03 DIAGNOSIS — I10 ESSENTIAL HYPERTENSION: ICD-10-CM

## 2024-09-03 DIAGNOSIS — E78.5 HYPERLIPIDEMIA, UNSPECIFIED HYPERLIPIDEMIA TYPE: ICD-10-CM

## 2024-09-03 PROCEDURE — 80061 LIPID PANEL: CPT

## 2024-09-03 PROCEDURE — 36415 COLL VENOUS BLD VENIPUNCTURE: CPT

## 2024-09-03 PROCEDURE — 80053 COMPREHEN METABOLIC PANEL: CPT

## 2024-09-04 ENCOUNTER — APPOINTMENT (OUTPATIENT)
Dept: PRIMARY CARE | Facility: CLINIC | Age: 44
End: 2024-09-04
Payer: MEDICARE

## 2024-09-04 VITALS
BODY MASS INDEX: 30.92 KG/M2 | WEIGHT: 228 LBS | OXYGEN SATURATION: 99 % | TEMPERATURE: 96.6 F | HEART RATE: 55 BPM | SYSTOLIC BLOOD PRESSURE: 120 MMHG | DIASTOLIC BLOOD PRESSURE: 70 MMHG

## 2024-09-04 DIAGNOSIS — R73.03 PREDIABETES: ICD-10-CM

## 2024-09-04 DIAGNOSIS — I27.20 PULMONARY HYPERTENSION (MULTI): ICD-10-CM

## 2024-09-04 DIAGNOSIS — R52 GENERALIZED PAIN: ICD-10-CM

## 2024-09-04 DIAGNOSIS — Z00.00 ROUTINE GENERAL MEDICAL EXAMINATION AT HEALTH CARE FACILITY: Primary | ICD-10-CM

## 2024-09-04 DIAGNOSIS — J45.20 MILD INTERMITTENT INTRINSIC ASTHMA WITHOUT STATUS ASTHMATICUS WITHOUT COMPLICATION (HHS-HCC): ICD-10-CM

## 2024-09-04 DIAGNOSIS — F32.2 SEVERE MAJOR DEPRESSION WITHOUT PSYCHOTIC FEATURES (MULTI): ICD-10-CM

## 2024-09-04 DIAGNOSIS — J30.1 ALLERGIC RHINITIS DUE TO POLLEN, UNSPECIFIED SEASONALITY: ICD-10-CM

## 2024-09-04 DIAGNOSIS — Z11.59 NEED FOR HEPATITIS C SCREENING TEST: ICD-10-CM

## 2024-09-04 DIAGNOSIS — G47.33 OBSTRUCTIVE SLEEP APNEA: ICD-10-CM

## 2024-09-04 DIAGNOSIS — E66.9 OBESITY (BMI 30.0-34.9): ICD-10-CM

## 2024-09-04 DIAGNOSIS — I10 ESSENTIAL HYPERTENSION: ICD-10-CM

## 2024-09-04 DIAGNOSIS — Z78.9 FULL CODE STATUS: ICD-10-CM

## 2024-09-04 DIAGNOSIS — E78.5 HYPERLIPIDEMIA, UNSPECIFIED HYPERLIPIDEMIA TYPE: ICD-10-CM

## 2024-09-04 DIAGNOSIS — Z11.4 SCREENING FOR HUMAN IMMUNODEFICIENCY VIRUS: ICD-10-CM

## 2024-09-04 DIAGNOSIS — E66.01 MORBID (SEVERE) OBESITY DUE TO EXCESS CALORIES (MULTI): ICD-10-CM

## 2024-09-04 DIAGNOSIS — M35.01 SJOGREN'S SYNDROME WITH KERATOCONJUNCTIVITIS SICCA (MULTI): ICD-10-CM

## 2024-09-04 PROBLEM — J45.40 MODERATE PERSISTENT ASTHMA (HHS-HCC): Status: RESOLVED | Noted: 2023-01-25 | Resolved: 2024-09-04

## 2024-09-04 PROBLEM — R11.2 CHEMOTHERAPY INDUCED NAUSEA AND VOMITING: Status: RESOLVED | Noted: 2023-01-25 | Resolved: 2024-09-04

## 2024-09-04 PROBLEM — T45.1X5A CHEMOTHERAPY INDUCED NAUSEA AND VOMITING: Status: RESOLVED | Noted: 2023-01-25 | Resolved: 2024-09-04

## 2024-09-04 LAB
ALBUMIN SERPL BCP-MCNC: 4.6 G/DL (ref 3.4–5)
ALP SERPL-CCNC: 65 U/L (ref 33–120)
ALT SERPL W P-5'-P-CCNC: 25 U/L (ref 10–52)
ANION GAP SERPL CALC-SCNC: 14 MMOL/L (ref 10–20)
AST SERPL W P-5'-P-CCNC: 34 U/L (ref 9–39)
BILIRUB SERPL-MCNC: 0.6 MG/DL (ref 0–1.2)
BUN SERPL-MCNC: 9 MG/DL (ref 6–23)
CALCIUM SERPL-MCNC: 9.5 MG/DL (ref 8.6–10.6)
CHLORIDE SERPL-SCNC: 103 MMOL/L (ref 98–107)
CHOLEST SERPL-MCNC: 234 MG/DL (ref 0–199)
CHOLESTEROL/HDL RATIO: 6.6
CO2 SERPL-SCNC: 27 MMOL/L (ref 21–32)
CREAT SERPL-MCNC: 1.14 MG/DL (ref 0.5–1.3)
EGFRCR SERPLBLD CKD-EPI 2021: 82 ML/MIN/1.73M*2
GLUCOSE SERPL-MCNC: 113 MG/DL (ref 74–99)
HDLC SERPL-MCNC: 35.2 MG/DL
LDLC SERPL CALC-MCNC: 169 MG/DL
NON HDL CHOLESTEROL: 199 MG/DL (ref 0–149)
POTASSIUM SERPL-SCNC: 4.7 MMOL/L (ref 3.5–5.3)
PROT SERPL-MCNC: 7.4 G/DL (ref 6.4–8.2)
SODIUM SERPL-SCNC: 139 MMOL/L (ref 136–145)
TRIGL SERPL-MCNC: 149 MG/DL (ref 0–149)
VLDL: 30 MG/DL (ref 0–40)

## 2024-09-04 PROCEDURE — 1036F TOBACCO NON-USER: CPT | Performed by: FAMILY MEDICINE

## 2024-09-04 PROCEDURE — G0439 PPPS, SUBSEQ VISIT: HCPCS | Performed by: FAMILY MEDICINE

## 2024-09-04 PROCEDURE — G0446 INTENS BEHAVE THER CARDIO DX: HCPCS | Performed by: FAMILY MEDICINE

## 2024-09-04 PROCEDURE — 3074F SYST BP LT 130 MM HG: CPT | Performed by: FAMILY MEDICINE

## 2024-09-04 PROCEDURE — 99214 OFFICE O/P EST MOD 30 MIN: CPT | Performed by: FAMILY MEDICINE

## 2024-09-04 PROCEDURE — 3078F DIAST BP <80 MM HG: CPT | Performed by: FAMILY MEDICINE

## 2024-09-04 PROCEDURE — 99396 PREV VISIT EST AGE 40-64: CPT | Performed by: FAMILY MEDICINE

## 2024-09-04 RX ORDER — ICOSAPENT ETHYL 1 G/1
2 CAPSULE ORAL
Qty: 360 CAPSULE | Refills: 1 | Status: SHIPPED | OUTPATIENT
Start: 2024-09-04 | End: 2025-03-03

## 2024-09-04 RX ORDER — FLUTICASONE PROPIONATE 50 MCG
2 SPRAY, SUSPENSION (ML) NASAL DAILY
Qty: 16 G | Refills: 1 | Status: SHIPPED | OUTPATIENT
Start: 2024-09-04 | End: 2025-03-03

## 2024-09-04 RX ORDER — AZELASTINE 1 MG/ML
2 SPRAY, METERED NASAL 2 TIMES DAILY
Qty: 30 ML | Refills: 3 | Status: SHIPPED | OUTPATIENT
Start: 2024-09-04

## 2024-09-04 RX ORDER — LISINOPRIL 20 MG/1
20 TABLET ORAL DAILY
Qty: 90 TABLET | Refills: 1 | Status: SHIPPED | OUTPATIENT
Start: 2024-09-04 | End: 2025-03-03

## 2024-09-04 ASSESSMENT — ACTIVITIES OF DAILY LIVING (ADL)
TAKING_MEDICATION: INDEPENDENT
DOING_HOUSEWORK: NEEDS ASSISTANCE
GROCERY_SHOPPING: NEEDS ASSISTANCE
MANAGING_FINANCES: INDEPENDENT
BATHING: INDEPENDENT
DRESSING: INDEPENDENT

## 2024-09-04 ASSESSMENT — ENCOUNTER SYMPTOMS
DIARRHEA: 0
MYALGIAS: 1
SHORTNESS OF BREATH: 0
NAUSEA: 0
VOMITING: 0
DYSPHORIC MOOD: 1
FATIGUE: 1
HEADACHES: 0
POLYPHAGIA: 0
ABDOMINAL PAIN: 1
CONSTIPATION: 1
BLOOD IN STOOL: 0
SLEEP DISTURBANCE: 1
DIZZINESS: 0
NERVOUS/ANXIOUS: 1
PALPITATIONS: 0
ARTHRALGIAS: 1
POLYDIPSIA: 0
DYSURIA: 0

## 2024-09-04 NOTE — PATIENT INSTRUCTIONS
Recommend a predominant low fat whole foods plant based diet.  Cut back on meat, dairy, processed carbs, salt and oils(especially palm and coconut). Increase fiber in your diet.  Decrease alcohol as much as possible if you drink. Recommend some exercise most days of the week. Also recommend good sleep habits aiming for 7-8 hours per night.     Follow up with your specialists as scheduled    Please bring in copies of your advance directives to your next appointment    Continue your current meds    Return in 6 months, sooner if needed

## 2024-09-04 NOTE — ASSESSMENT & PLAN NOTE
Orders:    lisinopril 20 mg tablet; Take 1 tablet (20 mg) by mouth once daily.    Comprehensive Metabolic Panel; Future    Hemoglobin A1C; Future

## 2024-09-04 NOTE — ASSESSMENT & PLAN NOTE
Orders:    lisinopril 20 mg tablet; Take 1 tablet (20 mg) by mouth once daily.    Comprehensive Metabolic Panel; Future

## 2024-09-04 NOTE — ASSESSMENT & PLAN NOTE
Orders:    fluticasone (Flonase) 50 mcg/actuation nasal spray; Administer 2 sprays into each nostril once daily. Shake gently. Before first use, prime pump. After use, clean tip and replace cap.    azelastine (Astelin) 137 mcg (0.1 %) nasal spray; Administer 2 sprays into each nostril 2 times a day. Use in each nostril as directed

## 2024-09-04 NOTE — PROGRESS NOTES
"Subjective   Reason for Visit: Luis Condon Jr. is an 44 y.o. male here for a Medicare Wellness visit, CPE and multiple issues.     Past Medical, Surgical, and Family History reviewed and updated in chart.    Reviewed all medications by prescribing practitioner or clinical pharmacist (such as prescriptions, OTCs, herbal therapies and supplements) and documented in the medical record.    HPI    Patient Care Team:  Sherrie Rosas DO as PCP - General  Elio Ruiz MD as Surgeon (Urology)  Christina Ely MD as Consulting Physician (Rheumatology)   Dr. Yan-neurology  Dr. Aguilar-cardiology  Dr. Gómez-endo  Dr. Garcia-allergist  Dr. Adriana Montalvo-genetics  Dr. Buck-ortho  Psychiatry-    HTN: controlled.   Lipids: high but improving. HDL 35, , (593)  Predm: stable.   BMI: high. Pt unable to exercise due to chronic pain.   Mood: \"not good\". Seeing psych. Still w/ insomnia.   AR: improving w/ allergy shots. Seeing allergist.   MARY JO-never got machine. Needs new referral.   Asthma: stable  Sjogrens: stable. Seeing rheum  Pulm HTN: stable. Followed by cardiology    Pain: chronic and diffuse. On chronic opiates.     Review of Systems   Constitutional:  Positive for fatigue.   HENT: Negative.     Eyes:  Negative for visual disturbance.   Respiratory:  Negative for shortness of breath.    Cardiovascular:  Negative for chest pain and palpitations.   Gastrointestinal:  Positive for abdominal pain and constipation. Negative for blood in stool, diarrhea, nausea and vomiting.   Endocrine: Negative for cold intolerance, heat intolerance, polydipsia, polyphagia and polyuria.   Genitourinary:  Negative for dysuria.   Musculoskeletal:  Positive for arthralgias and myalgias.   Skin:  Negative for rash.   Allergic/Immunologic: Positive for environmental allergies.   Neurological:  Negative for dizziness and headaches.   Psychiatric/Behavioral:  Positive for dysphoric mood and sleep " disturbance. The patient is nervous/anxious.        Objective   Vitals:  /70   Pulse 55   Temp 35.9 °C (96.6 °F)   Wt 103 kg (228 lb)   SpO2 99%   BMI 30.92 kg/m²       Physical Exam  Vitals and nursing note reviewed.   Constitutional:       General: He is not in acute distress.     Appearance: Normal appearance. He is not toxic-appearing or diaphoretic.   HENT:      Head: Normocephalic.      Right Ear: Tympanic membrane normal.      Left Ear: Tympanic membrane normal.      Nose: Nose normal.      Mouth/Throat:      Pharynx: Oropharynx is clear.   Eyes:      General: No scleral icterus.     Pupils: Pupils are equal, round, and reactive to light.   Neck:      Vascular: No carotid bruit.   Cardiovascular:      Rate and Rhythm: Normal rate and regular rhythm.      Heart sounds: No murmur heard.  Pulmonary:      Effort: Pulmonary effort is normal. No respiratory distress.      Breath sounds: Normal breath sounds.   Abdominal:      Palpations: Abdomen is soft.      Tenderness: There is no abdominal tenderness. There is no guarding.   Musculoskeletal:         General: No tenderness.      Cervical back: Neck supple.      Right lower leg: No edema.      Left lower leg: No edema.   Lymphadenopathy:      Cervical: No cervical adenopathy.   Skin:     General: Skin is warm.   Neurological:      General: No focal deficit present.      Mental Status: He is alert.      Cranial Nerves: No cranial nerve deficit.   Psychiatric:         Mood and Affect: Mood normal.         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    Comprehensive Metabolic Panel; Future    Prediabetes    Orders:    lisinopril 20 mg tablet; Take 1 tablet (20 mg) by mouth once daily.    Comprehensive Metabolic Panel; Future    Hemoglobin A1C; Future    Hyperlipidemia, unspecified hyperlipidemia type    Orders:    icosapent ethyL (Vascepa) 1 gram capsule; Take 2 capsules (2 g) by mouth 2 times daily (morning and late afternoon).    Comprehensive Metabolic Panel; Future    Lipid Panel; Future    Allergic rhinitis due to pollen, unspecified seasonality    Orders:    fluticasone (Flonase) 50 mcg/actuation nasal spray; Administer 2 sprays into each nostril once daily. Shake gently. Before first use, prime pump. After use, clean tip and replace cap.    azelastine (Astelin) 137 mcg (0.1 %) nasal spray; Administer 2 sprays into each nostril 2 times a day. Use in each nostril as directed    Full code status    Orders:    Full code    Obesity (BMI 30.0-34.9)         Sjogren's syndrome with keratoconjunctivitis sicca (Multi)         Mild intermittent intrinsic asthma without status asthmaticus without complication ([facility])         Generalized pain          Discussed blood work and wellness issues. Reviewed screenings and immunizations. Recommendations given. Declines flu vaccine    ASCVD risk counseling:  discussed ASCVD risk and score 6%.  Aspirin not indicated at this time. Lifestyle modifications including nutritional choices, exercise and trying to eliminate habits contributing to risk were discussed. Patient agreeable to make efforts to continue to control their current cardiovascular risk factors. Would not recommend statin for now due to chronic pain.     Declines referral to [facility]. Discussed if pt able to get CPAP, should follow up in 90 days. If not, will rec he see sleep med

## 2024-09-04 NOTE — ASSESSMENT & PLAN NOTE
Orders:    icosapent ethyL (Vascepa) 1 gram capsule; Take 2 capsules (2 g) by mouth 2 times daily (morning and late afternoon).    Comprehensive Metabolic Panel; Future    Lipid Panel; Future

## 2024-09-11 DIAGNOSIS — J45.20 MILD INTERMITTENT INTRINSIC ASTHMA WITHOUT STATUS ASTHMATICUS WITHOUT COMPLICATION (HHS-HCC): ICD-10-CM

## 2024-09-11 DIAGNOSIS — K59.09 CHRONIC CONSTIPATION: ICD-10-CM

## 2024-09-11 RX ORDER — LUBIPROSTONE 24 UG/1
24 CAPSULE ORAL 2 TIMES DAILY
Qty: 180 CAPSULE | Refills: 1 | Status: SHIPPED | OUTPATIENT
Start: 2024-09-11 | End: 2025-03-10

## 2024-09-11 RX ORDER — ALBUTEROL SULFATE 90 UG/1
2 INHALANT RESPIRATORY (INHALATION) EVERY 4 HOURS PRN
Qty: 8.5 G | Refills: 1 | Status: SHIPPED | OUTPATIENT
Start: 2024-09-11 | End: 2025-09-11

## 2024-09-12 ENCOUNTER — APPOINTMENT (OUTPATIENT)
Dept: ALLERGY | Facility: CLINIC | Age: 44
End: 2024-09-12
Payer: MEDICARE

## 2024-09-12 DIAGNOSIS — J30.89 ALLERGIC RHINITIS DUE TO OTHER ALLERGIC TRIGGER, UNSPECIFIED SEASONALITY: ICD-10-CM

## 2024-09-12 PROCEDURE — 95117 IMMUNOTHERAPY INJECTIONS: CPT | Performed by: ALLERGY & IMMUNOLOGY

## 2024-09-16 DIAGNOSIS — G62.89 SMALL FIBER POLYNEUROPATHY: ICD-10-CM

## 2024-09-16 DIAGNOSIS — G64: Primary | ICD-10-CM

## 2024-09-18 ENCOUNTER — LAB (OUTPATIENT)
Dept: LAB | Facility: LAB | Age: 44
End: 2024-09-18
Payer: MEDICARE

## 2024-09-18 DIAGNOSIS — G62.89 SMALL FIBER POLYNEUROPATHY: ICD-10-CM

## 2024-09-18 DIAGNOSIS — G64: ICD-10-CM

## 2024-10-03 ENCOUNTER — TELEPHONE (OUTPATIENT)
Dept: GENETICS | Facility: CLINIC | Age: 44
End: 2024-10-03
Payer: MEDICARE

## 2024-10-03 ENCOUNTER — HOSPITAL ENCOUNTER (OUTPATIENT)
Facility: CLINIC | Age: 44
Setting detail: OUTPATIENT SURGERY
End: 2024-10-03
Attending: ORTHOPAEDIC SURGERY | Admitting: ORTHOPAEDIC SURGERY
Payer: MEDICARE

## 2024-10-03 DIAGNOSIS — G56.03 CARPAL TUNNEL SYNDROME, BILATERAL: ICD-10-CM

## 2024-10-03 LAB
COMMENTS - MP RESULT TYPE: NORMAL
SCAN RESULT: NORMAL

## 2024-10-07 DIAGNOSIS — M35.06 SJOGREN'S SYNDROME WITH PERIPHERAL NERVOUS SYSTEM INVOLVEMENT: ICD-10-CM

## 2024-10-08 DIAGNOSIS — R52 PAIN, UNSPECIFIED: ICD-10-CM

## 2024-10-08 RX ORDER — OXYCODONE HYDROCHLORIDE 10 MG/1
10 TABLET ORAL EVERY 6 HOURS PRN
Qty: 115 TABLET | Refills: 0 | Status: SHIPPED | OUTPATIENT
Start: 2024-10-08

## 2024-10-08 RX ORDER — MELOXICAM 15 MG/1
15 TABLET ORAL DAILY PRN
Qty: 30 TABLET | Refills: 5 | Status: SHIPPED | OUTPATIENT
Start: 2024-10-08

## 2024-10-10 ENCOUNTER — APPOINTMENT (OUTPATIENT)
Dept: ALLERGY | Facility: CLINIC | Age: 44
End: 2024-10-10
Payer: MEDICARE

## 2024-10-10 DIAGNOSIS — J30.89 ALLERGIC RHINITIS DUE TO OTHER ALLERGIC TRIGGER, UNSPECIFIED SEASONALITY: ICD-10-CM

## 2024-10-10 PROCEDURE — 95117 IMMUNOTHERAPY INJECTIONS: CPT | Performed by: ALLERGY & IMMUNOLOGY

## 2024-10-13 ASSESSMENT — ENCOUNTER SYMPTOMS
NEUROLOGIC COMPLAINT: 1
CONFUSION: 1
SHORTNESS OF BREATH: 1
FATIGUE: 1
ABDOMINAL PAIN: 1

## 2024-10-14 ENCOUNTER — APPOINTMENT (OUTPATIENT)
Dept: GENETICS | Facility: CLINIC | Age: 44
End: 2024-10-14
Payer: MEDICARE

## 2024-10-14 DIAGNOSIS — G64: Primary | ICD-10-CM

## 2024-10-14 DIAGNOSIS — G56.01 CARPAL TUNNEL SYNDROME OF RIGHT WRIST: ICD-10-CM

## 2024-10-14 DIAGNOSIS — G62.89 SMALL FIBER POLYNEUROPATHY: ICD-10-CM

## 2024-10-14 NOTE — PROGRESS NOTES
Mr. 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 Per Dr. 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  Other causes of multifocal hypertrophic neuropathy cannot be excluded by this study.  However the lack of uniform nerve hypertrophy which strongly argue against an inherited demyelinating neuropathy such as Charcot-Anabel-Tooth.” He was last seen in genetics on 8/15/24 when I recommend [facility]. He is here today for a follow up visit and to discuss his results.     This visit was completed via telemedicine (synchronous audio and video). All issues as below were discussed and addressed but no physical exam was performed. If it was felt that the patient should be evaluated in clinic then they were directed there. The patient verbally consented to visit and participated in the visit from a location in [place].      Interval History:    Mr. Condon notes that he was advised by his neurologist to cancel his scheduled surgery for carpal tunnel while awaiting gene results, but then the results came back before original surgery date of 10/11/25.  (This still needs to be rescheduled.)    Mr. Condon notes that his skin calluses and flakes off, on both hands and feet.  This has been happening since at least 2011, around the time of developing his other health/neurological issues as outlined in previous note.  He saw a dermatologist, tried creams, but obtained no relief.  He notes that the symptoms come and go.  He also reports cutaneous allodynia, pain with light touch and from contact with bed  "sheets.    Answers submitted by the patient for this visit:  Neurological Problem Questionnaire (Submitted on 10/13/2024)  Chief Complaint: Neurologic complaint  Chronicity: chronic  Onset: more than 1 year ago  Onset quality: insidiously  Progression since onset: gradually worsening  Focality: no focality noted  abdominal pain: Yes  confusion: Yes  fatigue: Yes  auditory change: Yes  shortness of breath: Yes  Treatments tried: medication  Improvement on treatment: no relief    Genetic Test Results:    Hereditary Neuropathy Panel, result date: 10/3/24, result: Uncertain Clinical Significance (VUS)  DST, Autosomal recessive, c.4060 C>A, p.(L2216E), Heterozygous, VUS  DST, Autosomal recessive, c.5519 G>A, p.(L5463R), Heterozygous, VUS  SCN9A, Autosomal dominant, Autosomal recessive, c.3296 C>T, p.(S7426Y), Heterozygous, VUS  SCN9A, Autosomal dominant, Autosomal recessive, c.3370 C>T, p.(N7051G) Heterozygous, VUS    Additional Variants of Uncertain Significance:   SPG11 Autosomal recessive, c.2874 A>C, p.(E958D), Heterozygous, VUS    \"DST  GENE SUMMARY  The DST gene encodes the dystonin protein, also known as BPAG1, a large protein belonging to the plakin family that is a component of hemidesmosomes that interact with cytoskeletal filament networks (PMID: 91553535, 62826672, 96151968). Alternative splicing can give rise to different isoforms of the protein, including epithelial, muscle, brain and neuronal isoforms (PMID: 91626069). Biallelic nonsense variants in the coiled-coil domain of the DST gene, which is encoded by an exon that is only used in the epithelial isoform, have been reported in association with autosomal recessive epidermolysis bullosa simplex (EBS) (PMID: 82313842, 86258216, 53383388, 53263463). Affected individuals demonstrate relatively mild trauma-induced blistering, primarily on the hands and feet, generalized skin fragility, lack of hemidesmosomal inner plaques on transmission electron " "microscopy, and lack of BPAG1 on skin immunostaining (PMID: 00088471, 28283542, 01426365). Although most heterozygous carriers were asymptomatic, mild blistering during childhood has been reported in a small number of heterozygous family members, suggesting that EBS can be inherited in an autosomal recessive or autosomal dominant manner with heterozygotes having a milder phenotype, which is sometimes referred to as semi-dominant inheritance (PMID: 26428381). Biallelic variants in the neuronal isoform of DST have been reported in a small number of families with hereditary sensory and autonomic neuropathy with infantile onset (PMID: 65670473, 48991271). Affected individuals have dysautonomia symptoms, absent deep tendon reflexes, and joint deformities (PMID: 39300163, 69293177). Severe psychomotor retardation and early death was reported in one family (PMID: 13378202). One individual with a complex phenotype including both skin blistering and neuropathy, in addition to other novel symptoms, was reported to have compound heterozygosity for two variants in the neuronal isoform, one of which is also present in the epithelial isoform (PMID: 19193605).    p.(Mcq9671Hme) (CAA>AAA): c.4060 C>A in exon 23 of the DST gene (NM_001723.5)  - In silico analysis indicates that this missense variant does not alter protein structure/function  - Has not been previously published as pathogenic or benign to our knowledge  - Observed in large population cohorts (gnomAD; internal data)  We interpret this as a Variant of Uncertain Significance.    p.(Dqb6425Wjr) (CGT>CAT): c.5519 G>A in exon 23 of the DST gene (NM_001723.5)  - In silico analysis indicates that this missense variant does not alter protein structure/function  - Has not been previously published as pathogenic or benign to our knowledge  - Observed in large population cohorts (gnomAD; internal data)  We interpret this as a Variant of Uncertain Significance.\"      \"SCN9A  GENE " SUMMARY  The SCN9A gene encodes the voltage-gated sodium channel Nav1.7, which is expressed primarily in the dorsal root ganglia of the peripheral nervous system and mediates the voltage-dependent sodium ion permeability of excitable membranes. The protein contains four homologous domains (DI-DIV), each of which consists of six transmembrane segments (S1-S6). Within each homologous domain, the S4 segment is the voltage sensor and the S5 and S6 segments and their connecting loop comprise the pore region. Pathogenic variants in the SCN9A gene cause several distinct clinical disorders associated with increased pain sensitivity or indifference to pain, in some cases associated with autonomic or other symptoms. PIR3S-caolqxd disorders include: erythermalgia (IEM), paroxysmal extreme pain disorder  (PEPD), small fiber neuropathy (SFN), and congenital indifference to pain (CIP). IEM, PEPD, and SFN due to pathogenic variants in the SCN9A gene are inherited in an autosomal dominant manner, while CIP is inherited in an autosomal recessive manner. Pathogenic variants have been identified throughout the coding region of the SCN9A gene. IEM, PEPD, and SFN are caused by gain-of-function  missense variants resulting in neuronal hyperexcitability (PMID: 30357533). Whereas most IEM variants are typically seen in the first two homologous domains, a majority of the PEPD variants are found in the last two homologous domains (PMID: 83838977). CIP is caused by loss-of-function variants (PMID: 89221986, 76449998).    p.(Kjg9405Vki) (TCG>TTG): c.3296 C>T in exon 17 of the SCN9A gene (NM_002977.3)  - In silico analysis supports that this missense variant has a deleterious effect on protein structure/function  - The frequency of this variant in large population cohorts is higher than expected for disorder(s) associated with this gene (gnomAD)  - Has not been previously published as pathogenic or benign to our knowledge  We interpret this as  "a Variant of Uncertain Significance.    p.(Woz1566Yae) (CCT>TCT): c.3370 C>T in exon 18 of the SCN9A gene (NM_002977.3)  - In silico analysis supports that this missense variant has a deleterious effect on protein structure/function  - Observed in at least one homozygous individual in large population cohorts (gnomAD) and clinically unaffected adult relative of an  - individual referred [facility] genetic testing at [facility]  - Observed in large population cohorts (gnomAD; internal data)  - Has not been previously published as pathogenic or benign to our knowledge  We interpret this as a Variant of Uncertain Significance.\"      Previous genetic testing (ordered by Dr. Yan):   5/5/2024 - [facility] Hereditary Transthyretin-mediated amyloidosis (hATTR amyloidosis) Test - Result: Negative    Previous Results:    Imaging:   Per Dr. Yan, 7/25/24,   “EMGs:   May, 2015 - mild R CTS , no large fiber peripheral neuropathy, cervical radiculopathy, or denervatimg myopathy  December, 2011 and August 2016 - no large fiber peripheral neuropathy or denervating myopathy or left cervical or lumbar radiculopathy.  7/25/24 - mild, right median neuropathy across the wrist, and a right ulnar neuropathy across the elbow. No generalized large fiber peripheral neuropathy was noted.         MUSCLE BIOPSY - R quadriceps - 1/20/12 - SKELETAL MUSCLE BIOPSY DEMONSTRATING PROMINENT ENDOMYSIAL AND PERIMYSIAL FIBROUS TISSUE. NO INFLAMMATION, VASCULITIS, MUSCLE FIBER DEGENERATION. MAY INDICATE NON-INFLAMMATORY MUSCLE INVOLVEMENT IN COLLAGEN VASCULAR DISEASES.     Punch skin biopsy 2014 essentially normal, with some minor findings that may suggest small fiber neuropathy,     -Clinical presentation consistent with small fiber neuropathy, with superimposed bilateral CTS, and new sensory symptoms in lower extremities, including shooting pain in left lateral foot which appears to be in the sural distribution, and numbness of the right heel.  Underwent " "Neuromuscular Ultrasound of left foot 6/18/22 showing ultrasound evidence of a mild left distal tibial neuropathy at the tarsal tunnel.     Neuromuscular Ultrasound done today [7/25/24] [upper and lower extremities in this study]: There was ultrasound evidence of a multifocal hypertrophic neuropathy with preferential involvement of entrapment sites.      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 Allen Montiel:   \"IMPRESSION:  This is a severely abnormal and complex neuromuscular ultrasound examination of the nerves in the right and left upper extremities as well as a limited study of the nerves in the left lower extremity and a bilateral limited study of the neck (brachial plexus).     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  All the underlying musculature appeared normal as did all the bony, vascular and ligamentous structures.  However there was marked nerve hypertrophy of both ulnar nerves at the retrocondylar groove.  There was mild nerve hypertrophy of both median nerves at the carpal tunnel.  However on the left the median nerve was also enlarged in the forearm segment.  On the left there was definite enlargement and fascicular abnormalities of the median nerve in the upper arm and near the axilla.     Similar to the previous study in June, there was definite evidence of tibial neuropathy at the left tarsal tunnel.  However there was no evidence of peroneal neuropathy at the fibular neck.  The sciatic nerve appeared normal.  The sural nerve was studied throughout its anatomical course and was normal.     Lastly there was no definite evidence of nerve hypertrophy in the supraclavicular brachial plexus " "bilaterally.     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  Other causes of multifocal hypertrophic neuropathy cannot be excluded by this study.  However the lack of uniform nerve hypertrophy which strongly argue against an inherited demyelinating neuropathy such as Charcot-Anabel-Tooth.\"       Family History Updates: Patient notes that his maternal half-brother may be available for comparison genetic testing, if needed.   His mother is  and his father is unavailable for testing.      Discussion:    It was a pleasure to meet virtually with you today.    Nearly 100% of people with neuropathy with liability to pressure palsies (HNPP) have a findable genetic change in the PMP22 gene, either a missing copy of the gene (80% of cases) or spelling change (20% of cases).  I was not able to find any additional genes linked with HNPP.  (See Plan below for discussion of additional genes linked with nerve hypertrophy that are not part of HNPP.)    You tested negative/normal for this gene, so it is unlikely that you have hereditary neuropathy with liability to pressure palsies.  There may be another explanation for your enlarged nerves.    You have several variants of unknown significance on your large genetic testing panel.    A variant of unknown significance, also called a VUS, means that the laboratory found a difference in the gene that is not well-understood at this time.  Sometimes, these turn out to be harmful changes that affect the functioning of the gene and can be related to disease.  At other times, they turn out to be harmless, benign, changes that are meaningless.  Many people have harmless gene changes that reflect normal variation between people.    2 of them were in a gene called DST.  People with harmful changes in DST tend " to have a different type of neuropathy than you have.  They have severe skin symptoms that seem to be different from yours (they involve blistering and increased pigmentation afterwards -- we looked at photos together today) and sometimes these individuals have a type of neuropathy called hereditary sensory and autonomic neuropathy, which is not the type of neuropathy that you have.  People only develop this disease if they have a harmful change in the copy of the gene from their mother, and another harmful change in the copy of the gene from their father.  If you have only 1 harmful change, or 2 harmful changes inherited from the same parent, you could be a silent carrier.  Carriers do not have symptoms but would be a risk to have a child with a condition of their partner is also a carrier.  However, the laboratory has not really proven that either gene change is harmful.    The other gene, SCN9A, is associated with several different conditions, including small fiber neuropathy, a condition that you are suspected to have separately from the issue of enlarged nerves.  It is a relatively common genetic cause of small fiber neuropathy amongst those with an identifiable genetic cause, but most people with small fiber neuropathy do not have a findable genetic cause.  Once again, you had 2 variants of unknown significance.  People can develop small fiber neuropathy when they have either a single harmful gene change or 2 harmful gene changes (one on each copy of the gene).  However, the laboratory has not proven that either of your gene changes are harmful.  In fact, they noted that 1 seemed more common than would be typical for a disease-causing gene change, and the other 1 had been observed in healthy people, who either had a single dose or double dose of this gene change.  So, we have not proven that this gene is contributing to your small fiber neuropathy.  It would not be expected to explain your nerve  enlargement.    Finally you had a single variant of unknown significance in a gene, SPG11, in which 2 harmful changes (on opposite copies of the gene) are needed to cause disease.  The lab noted that this is unlikely to be the cause of your neuropathy.  Most people who have the disease related to this gene actually have a condition called hereditary spastic paraplegia (tightness and weakness of the legs), which you do not have.  In addition, one of the symptoms is neuropathy, which is why it was on the gene panel.  You could be at most a silent carrier of this condition, but we have not proven that you are actually a carrier.    I will ask Dr. Yan whether she can think of any acquired or non-genetic reasons for nerve enlargement.  If not, you may be a candidate for research-based genetic testing.  The research program looks for rare diseases.  As we discussed, they are typically not interested in studying the genetics of autoimmune diseases or any other condition that may have multiple interacting genetic causes.  If they think that enlargement of the nerves could be a sign of a rare disorder due to a change in a single gene, they may accept you into the study.    We discussed the Rare Genomes Project.  This collaboration between [place] and [facility] offers free whole genome sequencing to accepted candidates.  It is very thorough, but also takes at least a year typically for results.  If nothing is identified, the researchers currently will reanalyze the data on a regular basis free of charge, and also add some additional genetic testing for disorders that do not show up well on the genome sequencing test.    The website for the Rare Genomes Project is at [email or web address].  I will nominate you and you should be hearing back from the researchers within about 4 months by email. Typically, if the research team is interested in testing an individual, they will arrange a video interview with you. If you are accepted to  this program, please contact me so that I can help organize and send medical records. The researchers will help you arrange blood draws for you and for any participating family members.    We could also check back in with the [facility] lab in 1 to 2 years to see if any of these variants have been reclassified by then.  The laboratory may learn more about them in the meantime.    Plan:    1) I did some additional reading and outside of HNPP, these genes also can enlarge nerves:    FBLN5  GARS1  NEFL    Of these 3, you were already tested for all except FBLN5.  However, your nerve conduction studies were not suggestive of the condition linked with this gene.  It causes decreased nerve conduction velocities, consistent with a demyelinating peripheral neuropathy, in most patients although it is not clear that everyone has this finding.   (You did not have this finding.)    I did speak with Dr. Yan about the rarity of nerve hypertrophy and about this specific gene.  She did imply that this is rare and likely worth further workup.  From my reading, nerve hypertrophy is relatively rare.  Dr. Yan thought that this FBLN5 gene was unlikely to be the cause of your hypertrophy because your hypertrophy is not uniform throughout your nerves, but rather your hypertrophy is more prominent at entrapment sites (where nerves rub or are pinched against other structures).  With FBLN5 we would expect a more uniform hypertrophy, as we would with other conditions that, like this one, are under the umbrella of Charcot-Anabel-Tooth disease.    Testing you for this gene would likely be very costly, and because Dr. Yan does not think that it is a good match for your symptoms, I would not recommend seeking separate testing for it.    You plan to speak with Zaira Pepe, Dr. Yan's , about follow-up, and Dr. Yan also mention to me that she is planning to be in touch with you.    2) Dr. Yan agreed  with a nomination to the Rare Genomes Project.  Please contact me if you have not heard from the researchers within 4 months, or if you do hear a decision from the research team.    3) I recommend follow-up in 1 year, especially if you are participating in the research project, and we can check on the variants of unknown significance from the current test at that time.    4) I recommend that you take a photo of your skin when you have a flare.  You can send me a photo via [employer].  This could be of potential interest to the research team, if they think that there could be a connection with your nerve issues.  (There might not be.)    If you have any questions, please call Emmy Martinez in the [facility] at [phone] option 1 or at her direct number [phone], or you can send me a non-urgent message through [employer].    Adriana Leon MD  Clinical     Visit Time: 2:05 - 3:04    Documentation Time: 5:11-5:26, 5:32-5:34

## 2024-10-21 ENCOUNTER — TELEPHONE (OUTPATIENT)
Dept: RHEUMATOLOGY | Facility: CLINIC | Age: 44
End: 2024-10-21
Payer: MEDICARE

## 2024-10-29 ENCOUNTER — TELEMEDICINE (OUTPATIENT)
Dept: NEUROLOGY | Facility: HOSPITAL | Age: 44
End: 2024-10-29
Payer: COMMERCIAL

## 2024-10-29 DIAGNOSIS — M79.2 NEUROPATHIC PAIN: ICD-10-CM

## 2024-10-29 DIAGNOSIS — G56.03 CARPAL TUNNEL SYNDROME, BILATERAL: ICD-10-CM

## 2024-10-29 DIAGNOSIS — G62.89 SMALL FIBER POLYNEUROPATHY: ICD-10-CM

## 2024-10-29 DIAGNOSIS — G62.9 NEUROPATHY: Primary | ICD-10-CM

## 2024-10-29 PROCEDURE — 99213 OFFICE O/P EST LOW 20 MIN: CPT | Performed by: PSYCHIATRY & NEUROLOGY

## 2024-10-29 PROCEDURE — 99213 OFFICE O/P EST LOW 20 MIN: CPT | Mod: GT,U1 | Performed by: PSYCHIATRY & NEUROLOGY

## 2024-11-06 DIAGNOSIS — M35.06 SJOGREN'S SYNDROME WITH PERIPHERAL NERVOUS SYSTEM INVOLVEMENT: ICD-10-CM

## 2024-11-07 ENCOUNTER — APPOINTMENT (OUTPATIENT)
Dept: ALLERGY | Facility: CLINIC | Age: 44
End: 2024-11-07
Payer: MEDICARE

## 2024-11-07 DIAGNOSIS — G62.89 OTHER POLYNEUROPATHY: Primary | ICD-10-CM

## 2024-11-07 DIAGNOSIS — J30.89 ALLERGIC RHINITIS DUE TO OTHER ALLERGIC TRIGGER, UNSPECIFIED SEASONALITY: ICD-10-CM

## 2024-11-07 RX ORDER — OXYCODONE HYDROCHLORIDE 10 MG/1
10 TABLET ORAL EVERY 6 HOURS PRN
Qty: 115 TABLET | Refills: 0 | Status: SHIPPED | OUTPATIENT
Start: 2024-11-07

## 2024-11-19 ENCOUNTER — APPOINTMENT (OUTPATIENT)
Dept: NEUROSURGERY | Facility: CLINIC | Age: 44
End: 2024-11-19
Payer: MEDICARE

## 2024-11-22 ENCOUNTER — OFFICE VISIT (OUTPATIENT)
Dept: RHEUMATOLOGY | Facility: CLINIC | Age: 44
End: 2024-11-22
Payer: MEDICARE

## 2024-11-22 VITALS
BODY MASS INDEX: 31.42 KG/M2 | HEART RATE: 72 BPM | HEIGHT: 72 IN | SYSTOLIC BLOOD PRESSURE: 142 MMHG | OXYGEN SATURATION: 97 % | WEIGHT: 232 LBS | DIASTOLIC BLOOD PRESSURE: 77 MMHG

## 2024-11-22 DIAGNOSIS — M35.06 SJOGREN'S SYNDROME WITH PERIPHERAL NERVOUS SYSTEM INVOLVEMENT: ICD-10-CM

## 2024-11-22 PROCEDURE — 3077F SYST BP >= 140 MM HG: CPT | Performed by: INTERNAL MEDICINE

## 2024-11-22 PROCEDURE — 3008F BODY MASS INDEX DOCD: CPT | Performed by: INTERNAL MEDICINE

## 2024-11-22 PROCEDURE — 99213 OFFICE O/P EST LOW 20 MIN: CPT | Performed by: INTERNAL MEDICINE

## 2024-11-22 PROCEDURE — 99417 PROLNG OP E/M EACH 15 MIN: CPT | Performed by: INTERNAL MEDICINE

## 2024-11-22 PROCEDURE — 3078F DIAST BP <80 MM HG: CPT | Performed by: INTERNAL MEDICINE

## 2024-11-22 RX ORDER — OXYCODONE HYDROCHLORIDE 10 MG/1
10 TABLET ORAL EVERY 6 HOURS PRN
Qty: 115 TABLET | Refills: 0 | Status: SHIPPED | OUTPATIENT
Start: 2024-12-05

## 2024-11-22 ASSESSMENT — ENCOUNTER SYMPTOMS
FATIGUE: 1
CONSTIPATION: 1
JOINT SWELLING: 1
ARTHRALGIAS: 1
NECK STIFFNESS: 1
DIARRHEA: 1
BACK PAIN: 1
MYALGIAS: 1
NECK PAIN: 1

## 2024-11-22 NOTE — PROGRESS NOTES
Subjective   Patient ID: Luis Condon Jr. is a 44 y.o. male who presents for Follow-up (3 mo fuv).  HPI  Patient with Sjogren's with positive STEPHANY and SSB with sicca symptoms, elevated CPK with normal muscle biopsy, episcleritis, possible small fiber neuropathy on skin biopsy.  Previous medications have included azathioprine, CellCept, methotrexate, Cytoxan, IVIG, Rituxan, high-dose steroids.    Patient was last seen in August And at that time we had him continue with his narcotic therapy for his pains.  He had been working with neurology and had a more recent EMG that did show some CTS.  He was tested for HNP P and his genetic testing came back negative.  Had some other gene sequencing that were found of uncertain of the clinical significance.    He continues to be on gabapentin, alprazolam, meloxicam, oxycodone, turmeric, Flexeril.    He will be getting a nerve biopsy to see why his nerves are enlarged, is it hereditary or due to autoimmune.      A lot more feet issues.  His feet at night his pain is a lot more.  It has been way worse at [facility].  Shoots down his legs and his feet hurt so bad.  The side of his left foot and the heel of his right foot hurt  Still has shoulder pain.      Review of Systems   Constitutional:  Positive for fatigue.   Eyes:         Dry eye   Gastrointestinal:  Positive for constipation and diarrhea.   Musculoskeletal:  Positive for arthralgias, back pain, joint swelling, myalgias, neck pain and neck stiffness.   Psychiatric/Behavioral:          Depression       Objective   Physical Exam  GEN: NAD A&O x3 appropriate affect   EYES:  Wearing a brace on his right wrist   No current swelling in the hands elbows mild guarding with range of motion of the right shoulder has a brace on the right foot no swelling in the knees  No rashes seen on exposed skin     Assessment/Plan     Patient with +STEPHANY +SSB with sicca symptoms elevated cpks with normal muscle biopsy, episcleritis.   -Continues to  work with neurology.  He has been found to have enlarged nerves and uncertain if this is hereditary/genetic or if it is due to autoimmune.  He has an appointment to meet with neurosurgeon for nerve biopsy.    -In the past we have done different immunosuppression, immunomodulation that had minimal effect on his symptoms  -Reviewed his genetics and neurology appointments.      Sjogren's.- Has been on azathioprine, CellCept, methotrexate, Cytoxan, IVIG, Rituxan without any help in his symptoms. He had high dose steroids and had side effects to this       Has chronic pain.  Previously has seen pain management, Dr. Keagan Fan.  I have taken over his oxycodone prescription.  He is on combination of gabapentin, alprazolam, muscle relaxants and I am hesitant about increasing his medications further.  He also has had GI slowing which increasing narcotics could contribute more to.          In 3 months or as needed.     OARRS:  11/22/2024  I have personally reviewed the OARRS report for Luis Condon Jr.. I have considered the risks of abuse, dependence, addiction and diversion    Is the patient prescribed a combination of a benzodiazepine and opioid?  Yes, I feel it is clincially indicated to continue the medication and have discussed with the patient risks/benefits/alternatives.    Last Urine Drug Screen / ordered today: Yes  No results found for this or any previous visit (from the past 8760 hours).    Results are as expected.         Controlled Substance Agreement:  Date of the Last Agreement: 02/29/24  Reviewed Controlled Substance Agreement including but not limited to the benefits, risks, and alternatives to treatment with a Controlled Substance medication(s).    Opioids:  What is the patient's goal of therapy?help with pain  Is this being achieved with current treatment? some    I have calculated the patient's Morphine Dose Equivalent (MED):     I feel that it is clinically indicated to continue this current  medication regimen after consideration of alternative therapies, and other non-opioid treatment.    Opioid Risk Screening:  No data recorded    Pain Assessment:  No data recorded  Christina Ely MD 11/22/24 9:38 AM

## 2024-11-25 DIAGNOSIS — G62.9 POLYNEUROPATHY, UNSPECIFIED: ICD-10-CM

## 2024-11-25 RX ORDER — GABAPENTIN 100 MG/1
100 CAPSULE ORAL 3 TIMES DAILY
Qty: 270 CAPSULE | Refills: 3 | Status: SHIPPED | OUTPATIENT
Start: 2024-11-25

## 2024-11-26 DIAGNOSIS — G62.89 SMALL FIBER POLYNEUROPATHY: ICD-10-CM

## 2024-12-05 ENCOUNTER — APPOINTMENT (OUTPATIENT)
Dept: ALLERGY | Facility: CLINIC | Age: 44
End: 2024-12-05
Payer: MEDICARE

## 2024-12-05 DIAGNOSIS — J30.89 ALLERGIC RHINITIS DUE TO OTHER ALLERGIC TRIGGER, UNSPECIFIED SEASONALITY: ICD-10-CM

## 2024-12-05 PROCEDURE — 95117 IMMUNOTHERAPY INJECTIONS: CPT | Performed by: ALLERGY & IMMUNOLOGY

## 2024-12-18 RX ORDER — GABAPENTIN 600 MG/1
TABLET ORAL
Qty: 810 TABLET | Refills: 1 | Status: SHIPPED | OUTPATIENT
Start: 2024-12-18

## 2024-12-22 DIAGNOSIS — J30.1 ALLERGIC RHINITIS DUE TO POLLEN, UNSPECIFIED SEASONALITY: ICD-10-CM

## 2024-12-26 RX ORDER — AZELASTINE 1 MG/ML
2 SPRAY, METERED NASAL 2 TIMES DAILY
Refills: 3 | OUTPATIENT
Start: 2024-12-26

## 2024-12-31 ENCOUNTER — APPOINTMENT (OUTPATIENT)
Dept: NEUROSURGERY | Facility: CLINIC | Age: 44
End: 2024-12-31
Payer: MEDICARE

## 2025-01-02 DIAGNOSIS — M35.06 SJOGREN'S SYNDROME WITH PERIPHERAL NERVOUS SYSTEM INVOLVEMENT: ICD-10-CM

## 2025-01-03 RX ORDER — OXYCODONE HYDROCHLORIDE 10 MG/1
10 TABLET ORAL EVERY 6 HOURS PRN
Qty: 115 TABLET | Refills: 0 | Status: SHIPPED | OUTPATIENT
Start: 2025-01-03

## 2025-01-09 ENCOUNTER — APPOINTMENT (OUTPATIENT)
Dept: ALLERGY | Facility: CLINIC | Age: 45
End: 2025-01-09
Payer: MEDICARE

## 2025-01-09 DIAGNOSIS — J30.89 ALLERGIC RHINITIS DUE TO OTHER ALLERGIC TRIGGER, UNSPECIFIED SEASONALITY: ICD-10-CM

## 2025-01-09 PROCEDURE — 95117 IMMUNOTHERAPY INJECTIONS: CPT | Performed by: ALLERGY & IMMUNOLOGY

## 2025-01-14 ENCOUNTER — APPOINTMENT (OUTPATIENT)
Dept: NEUROSURGERY | Facility: CLINIC | Age: 45
End: 2025-01-14
Payer: MEDICARE

## 2025-01-14 VITALS
DIASTOLIC BLOOD PRESSURE: 88 MMHG | BODY MASS INDEX: 33.57 KG/M2 | HEART RATE: 75 BPM | SYSTOLIC BLOOD PRESSURE: 142 MMHG | HEIGHT: 71 IN | WEIGHT: 239.8 LBS

## 2025-01-14 DIAGNOSIS — G62.89 OTHER POLYNEUROPATHY: ICD-10-CM

## 2025-01-14 PROCEDURE — 3008F BODY MASS INDEX DOCD: CPT | Performed by: STUDENT IN AN ORGANIZED HEALTH CARE EDUCATION/TRAINING PROGRAM

## 2025-01-14 PROCEDURE — 99204 OFFICE O/P NEW MOD 45 MIN: CPT | Performed by: STUDENT IN AN ORGANIZED HEALTH CARE EDUCATION/TRAINING PROGRAM

## 2025-01-14 PROCEDURE — 3077F SYST BP >= 140 MM HG: CPT | Performed by: STUDENT IN AN ORGANIZED HEALTH CARE EDUCATION/TRAINING PROGRAM

## 2025-01-14 PROCEDURE — 1036F TOBACCO NON-USER: CPT | Performed by: STUDENT IN AN ORGANIZED HEALTH CARE EDUCATION/TRAINING PROGRAM

## 2025-01-14 PROCEDURE — 3079F DIAST BP 80-89 MM HG: CPT | Performed by: STUDENT IN AN ORGANIZED HEALTH CARE EDUCATION/TRAINING PROGRAM

## 2025-01-14 RX ORDER — TAZAROTENE 1 MG/G
CREAM TOPICAL
COMMUNITY
Start: 2024-12-23

## 2025-01-14 RX ORDER — DOXYCYCLINE 100 MG/1
CAPSULE ORAL
COMMUNITY
Start: 2024-12-17

## 2025-01-14 RX ORDER — KETOCONAZOLE 20 MG/ML
SHAMPOO, SUSPENSION TOPICAL
COMMUNITY
Start: 2024-12-17

## 2025-01-14 ASSESSMENT — PAIN SCALES - GENERAL: PAINLEVEL_OUTOF10: 7

## 2025-01-14 ASSESSMENT — PATIENT HEALTH QUESTIONNAIRE - PHQ9
10. IF YOU CHECKED OFF ANY PROBLEMS, HOW DIFFICULT HAVE THESE PROBLEMS MADE IT FOR YOU TO DO YOUR WORK, TAKE CARE OF THINGS AT HOME, OR GET ALONG WITH OTHER PEOPLE: NOT DIFFICULT AT ALL
SUM OF ALL RESPONSES TO PHQ9 QUESTIONS 1 & 2: 1
1. LITTLE INTEREST OR PLEASURE IN DOING THINGS: SEVERAL DAYS
2. FEELING DOWN, DEPRESSED OR HOPELESS: NOT AT ALL

## 2025-01-14 NOTE — PROGRESS NOTES
[facility]  Department of Neurological Surgery  New Patient Visit    History of Present Illness:  Luis Condon Jr. is a 44 y.o. year old male who presents with a diffuse neuropathy of unknown etiology and was referred by neurology for a Sural Nerve and Gastrocnemius muscle biopsy.     Luis is a very pleasant gentlemen who has had diffuse symptoms likely due to an underlying autoimmune etiology, including Sjogrens, fibromyalgia, and history of GI issues.  He states the symptoms have progressed every year and has become very debilitating.    Dr. Yan, 10/29/24  IMPRESSION:  Diffuse pain and distal sensory loss to pinprick and cold on previous exams with numbness and tingling, and probable bilateral carpal tunnel syndrome,in setting of Sjogrens and fibromyalgia - examination consistent with primarily SMALL FIBER SENSORY neuropathy, likely secondary to underlying autoimmune disorder, with superimposed bilateral carpal tunnel syndrome. Muscle biopsy abnormal - consistent with NON-INFLAMMATORY MUSCLE DISORDER, as can be seen in collagen vascular disease (increased epimysial and perimysial fibrosis).      PLAN:   - Carpal tunnel syndrome - bilaterally - Bilateral steroid injections by Dr. Marx were temporarily helpful. I think he will likely need bilateral carpal tunnel release surgery.       -  Small fiber neuropathy - He continues to be very disabled by pain and small fiber neuropathy symptoms. Multiple regimens including membrane stabilizers, immunosuppressants, ketamine infusion,and Rituxan have not been successful. IVIG may have been helpful, after a careful review of his records. He seems to be having many systemic issues without a clear etiology at the moment, including unexplained weight gain, shortness of breath, increased swelling in his legs, tightness of his pelvic floor muscles, and more GI symptoms. Gabapentin dose very high at the moment, and I do not want to go any  higher on this until he has had further investigation of his other symptoms, which is in process. I explained to him that Gabapentin can cause swelling, although he has been on Gabapentin for a long time, without those side effects. I will follow his progress with his further evaluation, and be in touch with him regarding other lab studies I may want to obtain, including screening tests for amyloid., given his painful neuropathy, bilateral carpal tunnel syndrome, GI symptoms, and other systemic issues.       - Left lateral foot pain -does not sound like Tarsal Tunnel Syndrome - symptoms sound more like irritation of the left sural nerve. I will order a Neuromuscular ultrasound of the left sural nerve, and evaluation for possible tarsal tunnel syndrome.     Dr. Ely, Rheumatology  +STEPHANY, +SSB,   Sjogren's.- Has been on azathioprine, CellCept, methotrexate, Cytoxan, IVIG, Rituxan without any help in his symptoms. He had high dose steroids and had side effects to this     Has chronic pain. Previously has seen pain management, Dr. Keagan Fan. I have taken over his oxycodone prescription. He is on combination of gabapentin, alprazolam, muscle relaxants and I am hesitant about increasing his medications further. He also has had GI slowing which increasing narcotics could contribute more to.       Patient's BMI is There is no height or weight on file to calculate BMI.    14/14 systems reviewed and negative other than what is listed in the history of present illness    Patient Active Problem List   Diagnosis    Abdominal pain    Abnormal EKG    Acquired trigger finger    Acute right ankle pain    Adverse effect of other vaccines and biological substances, initial encounter    Allergic rhinitis due to pollen    Borderline high cholesterol    Bursitis, subacromial    Carpal tunnel syndrome of right wrist    Chronic allergic conjunctivitis    Decreased sense of smell    Episcleritis    Facial pressure    Fatigue     Generalized pain    Headache    Hypertrophy of both inferior nasal turbinates    Impingement syndrome of right shoulder    Shoulder impingement, right    Localized chondromalacia    Male erectile disorder    Myalgia    Nasal congestion    Breathing-related sleep disorder    Obstructive sleep apnea    Sleep apnea    Oral thrush    Pain in joint of right shoulder    Right shoulder pain    Pain in joint, hand    Pelvic pain in male    Myositis    Pharyngitis    Poor sleep pattern    Postnasal drip    Prostatitis    Pulmonary hypertension (Multi)    PVC's (premature ventricular contractions)    Reactive airway disease ([facility])    Restless leg syndrome    Right ankle strain, initial encounter    Right shoulder strain, initial encounter    Sjogren's syndrome with keratoconjunctivitis sicca (Multi)    Sjogrens syndrome    Small fiber polyneuropathy    Urinary frequency    Vitamin D insufficiency    Disorder of sacrum    Instability of right ankle joint    Abdominal bloating    Chronic constipation    Osteochondritis dissecans of knee    Sprain of ankle, right    Sprain of unspecified ligament of right ankle, subsequent encounter    Benign essential hypertension    Generalized anxiety disorder    Hyperlipidemia    Insomnia    Intrinsic asthma without status asthmaticus ([facility])    Essential hypertension    Severe major depression without psychotic features (Multi)    Sjogren's syndrome    Prediabetes    Chronic pain syndrome    Depressed mood    GERD (gastroesophageal reflux disease)    Neuropathic pain    Small fiber neuropathy    Morbid (severe) obesity due to excess calories (Multi)    Carpal tunnel syndrome, bilateral     Past Medical History:   Diagnosis Date    Chemotherapy induced nausea and vomiting 01/25/2023    Disorder of muscle, unspecified 04/10/2018    Disorder of muscle, unspecified    Disorder of prostate, unspecified     Prostate disease    Hiccough 02/19/2020    Intractable hiccups    Male erectile  dysfunction, unspecified 02/26/2013    Organic impotence    Moderate persistent asthma ([facility]) 01/25/2023    Unspecified whether complicated      Other instability, unspecified ankle 11/09/2019    Ankle instability    Other meniscus derangements, unspecified medial meniscus, unspecified knee 08/17/2018    Derangement of medial meniscus    Personal history of diseases of the skin and subcutaneous tissue 09/14/2019    History of dermatitis    Personal history of other diseases of the circulatory system     History of hypertension    Personal history of other diseases of the nervous system and sense organs     History of migraine with aura    Personal history of other diseases of the respiratory system     Personal history of asthma    Personal history of other diseases of urinary system     History of bladder problems    Personal history of other endocrine, nutritional and metabolic disease     History of hyperlipidemia    Personal history of other endocrine, nutritional and metabolic disease 12/14/2016    History of goiter    Personal history of other mental and behavioral disorders     History of depression    Pure hypercholesterolemia, unspecified     High cholesterol    Sjogren syndrome, unspecified (Multi) 09/02/2015    History of Sjogren's disease    Sleep apnea, unspecified 04/27/2015    Sleep apnea    Snoring     Snoring     Past Surgical History:   Procedure Laterality Date    KNEE SURGERY  01/12/2016    Knee Surgery    TONSILLECTOMY  02/08/2017    Tonsillectomy With Adenoidectomy    TONSILLECTOMY  09/02/2015    Tonsillectomy     Social History     Tobacco Use    Smoking status: Never    Smokeless tobacco: Never   Substance Use Topics    Alcohol use: Never     family history includes Asthma in his father and another family member; Cerebrovascular accident in his mother; Coronary artery disease in his mother; Crohn's disease in his brother; Diabetes mellitus in his father and mother; Heart attack in his  mother; Hypertension in his father, mother, and another family member; Lung cancer in his paternal grandfather; Stroke in his maternal grandfather and paternal grandmother.    Current Outpatient Medications:     albuterol (ProAir HFA) 90 mcg/actuation inhaler, Inhale 2 puffs every 4 hours if needed for wheezing or shortness of breath., Disp: 8.5 g, Rfl: 1    albuterol 2.5 mg /3 mL (0.083 %) nebulizer solution, Inhale 3 mL (2.5 mg) every 6 hours if needed., Disp: , Rfl:     ALPRAZolam (Xanax) 2 mg tablet, Take 1 tablet (2 mg) by mouth 4 times a day as needed., Disp: , Rfl:     azelastine (Astelin) 137 mcg (0.1 %) nasal spray, Administer 2 sprays into each nostril 2 times a day. Use in each nostril as directed, Disp: 30 mL, Rfl: 3    buPROPion (Wellbutrin) 75 mg tablet, Take 2 tablets (150 mg) by mouth 2 times a day., Disp: , Rfl:     cetirizine (ZyrTEC) 10 mg tablet, , Disp: , Rfl:     cloNIDine (Catapres) 0.1 mg tablet, Take 1 tablet (0.1 mg) by mouth 3 times a day., Disp: , Rfl:     cyclobenzaprine (Flexeril) 10 mg tablet, Take 1 tablet (10 mg) by mouth 2 times a day., Disp: 180 tablet, Rfl: 3    EPINEPHrine 0.3 mg/0.3 mL injection syringe, Inject 0.3 mL (0.3 mg) into the muscle 1 time if needed for anaphylaxis for up to 1 dose. As directed, Disp: 2 each, Rfl: 3    fluticasone (Flonase) 50 mcg/actuation nasal spray, Administer 2 sprays into each nostril once daily. Shake gently. Before first use, prime pump. After use, clean tip and replace cap., Disp: 16 g, Rfl: 1    fluticasone (Flovent) 220 mcg/actuation inhaler, Inhale 2 puffs 2 times a day. Rinse mouth after use, Disp: , Rfl:     gabapentin (Neurontin) 100 mg capsule, TAKE 1 CAPSULE BY MOUTH THREE TIMES A DAY, Disp: 270 capsule, Rfl: 3    gabapentin (Neurontin) 600 mg tablet, TAKE 3 TABLETS BY MOUTH 3 TIMES DAILY AS DIRECTED, Disp: 810 tablet, Rfl: 1    hydrOXYzine pamoate (Vistaril) 25 mg capsule, TAKE 3-4 CAPSULES BY MOUTH AT BEDTIME AS NEEDED, Disp: , Rfl:      icosapent ethyL (Vascepa) 1 gram capsule, Take 2 capsules (2 g) by mouth 2 times daily (morning and late afternoon)., Disp: 360 capsule, Rfl: 1    lisinopril 20 mg tablet, Take 1 tablet (20 mg) by mouth once daily., Disp: 90 tablet, Rfl: 1    lubiprostone (Amitiza) 24 mcg capsule, Take 1 capsule (24 mcg) by mouth 2 times a day. With food, Disp: 180 capsule, Rfl: 1    meloxicam (Mobic) 15 mg tablet, TAKE 1 TABLET BY MOUTH EVERY DAY AS NEEDED, Disp: 30 tablet, Rfl: 5    methylPREDNISolone (Medrol Dospak) 4 mg tablets, TAKE 6 TABLETS ON DAY 1 AS DIRECTED ON PACKAGE AND DECREASE BY 1 TAB EACH DAY FOR A TOTAL OF 6 DAYS, Disp: 21 each, Rfl: 5    mirtazapine (Remeron) 30 mg tablet, Take 2 tablets (60 mg) by mouth once daily at bedtime., Disp: , Rfl:     multivitamin tablet, Take 1 tablet by mouth once daily., Disp: , Rfl:     naloxone (Narcan) 4 mg/0.1 mL nasal spray, Administer 1 spray (4 mg) into affected nostril(s). 1 actuation in one nostril x1, may repeat dose q2-3min until pt responsive or EMS arrives, Disp: , Rfl:     omeprazole (PriLOSEC) 40 mg DR capsule, Take 1 capsule (40 mg) by mouth once daily in the morning. Take before meals. Do not crush or chew., Disp: 90 capsule, Rfl: 1    oxyCODONE (Roxicodone) 10 mg immediate release tablet, Take 1 tablet (10 mg) by mouth every 6 hours if needed for severe pain (7 - 10)., Disp: 115 tablet, Rfl: 0    PARoxetine (Paxil) 20 mg tablet, Take 1 tablet (20 mg) by mouth once daily in the morning., Disp: , Rfl:     propranolol (Inderal) 10 mg tablet, Take 1 tablet (10 mg) by mouth 2 times a day as needed (anxiety)., Disp: , Rfl:     TURMERIC ORAL, Turmeric 500 MG Oral Capsule, Disp: , Rfl:     ZINC ORAL, Zinc 100 MG Oral Tablet, Disp: , Rfl:   Allergies   Allergen Reactions    Sulfa (Sulfonamide Antibiotics) Hives and Rash    Bee Pollen Unknown    Cat Dander Unknown    Grass Pollen Itching    House Dust Unknown    Other Unknown     Grass       Physical  Examination    General: Well developed, awake/alert/oriented x3, no distress, alert and cooperative    Left Sural nerve distribution has sensation about 70% of normal with tingling.     Results    I personally reviewed and interpreted the imaging results.    Assessment and Plan:  Luis Condon Jr. is a 44 y.o. year old male who presents with a diffuse neuropathy of unknown etiology and was referred by neurology for a Sural Nerve and Gastrocnemius muscle biopsy.     Luis is a very pleasant gentlemen who has had diffuse symptoms likely due to an underlying autoimmune etiology, including Sjogrens, fibromyalgia, and history of GI issues.  He states the symptoms have progressed every year and has become very debilitating.    He states that Dr. Yan discussed extensively with him the benefit if a biopsy.  The request seems  very appropriate in light of his progressive and debilitating symptoms.     I explained the risks of the procedure .  The most significant risk is that the biopsy does not guarantee a diagnosis, which is relatively common.  There is also the risk of infection and bleeding.  There will be numbness in the distribution of the sural nerve in the foot.  There is also the risk of neuropathic pain in the sural nerve distribution.  The muscle biopsy has a risk of infection and bleeding. The patient would like to proceed with the biopsy.    I have reviewed all prior documentation and reviewed the electronic medical record since admission. I have personally have reviewed all advanced imaging not just the reports and used my interpretation as documented as the relevant findings. I have reviewed the risks and benefits of all treatment recommendations listed in this note with the patient and family.       The above clinical summary has been dictated with voice recognition software. It has not been proofread for grammatical errors, typographical mistakes, or other semantic inconsistencies.    Thank you for  visiting our office today. It was our pleasure to take part in your healthcare.     Do not hesitate to call with any questions regarding your plan of care after leaving at [phone] M-F 8am-4pm.     To clinicians, thank you very much for this kind referral. It is a privilege to partner with you in the care of your patients. My office would be delighted to assist you with any further consultations or with questions regarding the plan of care outlined. Do not hesitate to call the office or contact me directly.       Sincerely,      Parker Francis MD, PhD  Attending Neurosurgeon, [facility]   of Neurological Surgery  [facility]  Office: [phone]  Fax: [phone]    [facility]  [address] [place] [address]  [address] [id number]

## 2025-01-30 DIAGNOSIS — M35.06 SJOGREN'S SYNDROME WITH PERIPHERAL NERVOUS SYSTEM INVOLVEMENT: ICD-10-CM

## 2025-01-30 RX ORDER — OXYCODONE HYDROCHLORIDE 10 MG/1
10 TABLET ORAL EVERY 6 HOURS PRN
Qty: 115 TABLET | Refills: 0 | Status: SHIPPED | OUTPATIENT
Start: 2025-01-30

## 2025-02-03 DIAGNOSIS — G62.89 OTHER SPECIFIED POLYNEUROPATHIES: Primary | ICD-10-CM

## 2025-02-03 RX ORDER — ACETAMINOPHEN 325 MG/1
975 TABLET ORAL ONCE
Status: CANCELLED | OUTPATIENT
Start: 2025-02-03 | End: 2025-02-03

## 2025-02-06 ENCOUNTER — APPOINTMENT (OUTPATIENT)
Dept: ALLERGY | Facility: CLINIC | Age: 45
End: 2025-02-06
Payer: MEDICARE

## 2025-02-06 DIAGNOSIS — J30.89 ALLERGIC RHINITIS DUE TO OTHER ALLERGIC TRIGGER, UNSPECIFIED SEASONALITY: ICD-10-CM

## 2025-02-06 PROCEDURE — 95117 IMMUNOTHERAPY INJECTIONS: CPT | Performed by: ALLERGY & IMMUNOLOGY

## 2025-02-10 ENCOUNTER — CLINICAL SUPPORT (OUTPATIENT)
Dept: PREADMISSION TESTING | Facility: HOSPITAL | Age: 45
End: 2025-02-10
Payer: MEDICARE

## 2025-02-10 NOTE — CPM/PAT H&P
CPM/PAT Evaluation       Name: Luis Condon Jr. (Luis Condon Jr.)  /Age: 1980/44 y.o.     { PAT Visit Type:79590}      Chief Complaint: ***    HPI  Luis Condon Jr. is scheduled for Left sural nerve and gastrocnemius muscle biopsy- left on 25 with Dr. Francis.  Past Medical History:   Diagnosis Date    Carpal tunnel syndrome     Chemotherapy induced nausea and vomiting 2023    Chronic allergic conjunctivitis     Chronic pain disorder     Disorder of muscle, unspecified 04/10/2018    Disorder of muscle, unspecified    Disorder of prostate, unspecified     Prostate disease    Fibromyalgia, primary     Hiccough 2020    Intractable hiccups    Hypertension     Male erectile dysfunction, unspecified 2013    Organic impotence    Moderate persistent asthma 2023    Unspecified whether complicated      Neuropathy     Other instability, unspecified ankle 2019    Ankle instability    Other meniscus derangements, unspecified medial meniscus, unspecified knee 2018    Derangement of medial meniscus    Personal history of diseases of the skin and subcutaneous tissue 2019    History of dermatitis    Personal history of other diseases of the nervous system and sense organs     History of migraine with aura    Personal history of other diseases of the respiratory system     Personal history of asthma    Personal history of other diseases of urinary system     History of bladder problems    Personal history of other endocrine, nutritional and metabolic disease 2016    History of goiter    Personal history of other mental and behavioral disorders     History of depression    Pure hypercholesterolemia, unspecified     High cholesterol    Sjogren syndrome, unspecified (Multi)     History of Sjogren's disease    Sleep apnea, unspecified 2024    Mild MARY JO    Snoring     Snoring       Past Surgical History:   Procedure Laterality Date    COLONOSCOPY       ESOPHAGOGASTRODUODENOSCOPY      KNEE SURGERY  2016    Knee Surgery    TONSILLECTOMY  2017    Tonsillectomy With Adenoidectomy    TONSILLECTOMY  2015    Tonsillectomy       Patient  has no history on file for sexual activity.    Family History   Problem Relation Name Age of Onset    Other (Cerebrovascular accident) Mother      Other (Diabetes mellitus) Mother      Hypertension Mother      Heart attack Mother      Coronary artery disease Mother          CAD- 42    Asthma Father      Other (Diabetes mellitus) Father      Hypertension Father      Crohn's disease Brother          x 2    Stroke Maternal Grandfather      Stroke Paternal Grandmother      Lung cancer Paternal Grandfather      Asthma Other Grandmother     Hypertension Other Grandmother        Allergies   Allergen Reactions    Sulfa (Sulfonamide Antibiotics) Hives and Rash    Bee Pollen Unknown    Cat Dander Unknown    Grass Pollen Itching    House Dust Unknown    Other Unknown     Grass       Prior to Admission medications    Medication Sig Start Date End Date Taking? Authorizing Provider   albuterol (ProAir HFA) 90 mcg/actuation inhaler Inhale 2 puffs every 4 hours if needed for wheezing or shortness of breath. 24  Sherrie Rosas, DO   albuterol 2.5 mg /3 mL (0.083 %) nebulizer solution Inhale 3 mL (2.5 mg) every 6 hours if needed.    Historical Provider, MD   ALPRAZolam (Xanax) 2 mg tablet Take 1 tablet (2 mg) by mouth 4 times a day as needed.    Historical Provider, MD   azelastine (Astelin) 137 mcg (0.1 %) nasal spray Administer 2 sprays into each nostril 2 times a day. Use in each nostril as directed 24   Sherrie Rosas, DO   buPROPion (Wellbutrin) 75 mg tablet Take 2 tablets (150 mg) by mouth 2 times a day. 23   Historical Provider, MD   cetirizine (ZyrTEC) 10 mg tablet     Historical Provider, MD   cloNIDine (Catapres) 0.1 mg tablet Take 1 tablet (0.1 mg) by mouth 3 times a day. 24   Historical  Provider, MD   cyclobenzaprine (Flexeril) 10 mg tablet Take 1 tablet (10 mg) by mouth 2 times a day. 8/8/24 8/8/25  Christina Ely MD   doxycycline (Vibramycin) 100 mg capsule TAKE ONE PILL DAILY WITH A FULL GLASS OF WATER AND FOOD. 12/17/24   Historical Provider, MD   EPINEPHrine 0.3 mg/0.3 mL injection syringe Inject 0.3 mL (0.3 mg) into the muscle 1 time if needed for anaphylaxis for up to 1 dose. As directed 6/27/24   Deejay Garcia MD   fluticasone (Flonase) 50 mcg/actuation nasal spray Administer 2 sprays into each nostril once daily. Shake gently. Before first use, prime pump. After use, clean tip and replace cap. 9/4/24 3/3/25  Sherrie Rosas DO   fluticasone (Flovent) 220 mcg/actuation inhaler Inhale 2 puffs 2 times a day. Rinse mouth after use 2/17/20   Historical Provider, MD   gabapentin (Neurontin) 100 mg capsule TAKE 1 CAPSULE BY MOUTH THREE TIMES A DAY 11/25/24   Polina Yan MD PhD   gabapentin (Neurontin) 600 mg tablet TAKE 3 TABLETS BY MOUTH 3 TIMES DAILY AS DIRECTED 12/18/24   Polina Yan MD PhD   hydrOXYzine pamoate (Vistaril) 25 mg capsule TAKE 3-4 CAPSULES BY MOUTH AT BEDTIME AS NEEDED 5/9/24   Historical Provider, MD   icosapent ethyL (Vascepa) 1 gram capsule Take 2 capsules (2 g) by mouth 2 times daily (morning and late afternoon). 9/4/24 3/3/25  Sherrie Rosas DO   ketoconazole (NIZOral) 2 % shampoo PLEASE SEE ATTACHED FOR DETAILED DIRECTIONS 12/17/24   Historical Provider, MD   lisinopril 20 mg tablet Take 1 tablet (20 mg) by mouth once daily. 9/4/24 3/3/25  Sherrie Rosas DO   lubiprostone (Amitiza) 24 mcg capsule Take 1 capsule (24 mcg) by mouth 2 times a day. With food 9/11/24 3/10/25  Sherrie Rosas DO   meloxicam (Mobic) 15 mg tablet TAKE 1 TABLET BY MOUTH EVERY DAY AS NEEDED 10/8/24   Christina Ely MD   methylPREDNISolone (Medrol Dospak) 4 mg tablets TAKE 6 TABLETS ON DAY 1 AS DIRECTED ON PACKAGE AND DECREASE BY 1 TAB EACH DAY FOR A TOTAL OF 6 DAYS  8/8/24   Christina Ely MD   mirtazapine (Remeron) 30 mg tablet Take 2 tablets (60 mg) by mouth once daily at bedtime.    Historical Provider, MD   multivitamin tablet Take 1 tablet by mouth once daily.    Historical Provider, MD   naloxone (Narcan) 4 mg/0.1 mL nasal spray Administer 1 spray (4 mg) into affected nostril(s). 1 actuation in one nostril x1, may repeat dose q2-3min until pt responsive or EMS arrives    Historical Provider, MD   omeprazole (PriLOSEC) 40 mg DR capsule Take 1 capsule (40 mg) by mouth once daily in the morning. Take before meals. Do not crush or chew. 3/21/24 9/17/24  Sherrie Rosas,    oxyCODONE (Roxicodone) 10 mg immediate release tablet Take 1 tablet (10 mg) by mouth every 6 hours if needed for severe pain (7 - 10). 1/30/25   Christina Ely MD   PARoxetine (Paxil) 20 mg tablet Take 1 tablet (20 mg) by mouth once daily in the morning.    Historical Provider, MD   propranolol (Inderal) 10 mg tablet Take 1 tablet (10 mg) by mouth 2 times a day as needed (anxiety).    Historical Provider, MD   tazarotene (Tazorac) 0.1 % cream APPLY PEA SIZED AMOUNT TOPICALLY TO FACE 2-3 NIGHTS A WEEK 12/23/24   Historical Provider, MD   TURMERIC ORAL Turmeric 500 MG Oral Capsule    Historical Provider, MD   ZINC ORAL Zinc 100 MG Oral Tablet    Historical Provider, MD LASHELL LOBO Physical Exam     Airway    Testing/Diagnostic:   CT CARDIAC SCORING WO IV CONTRAST; 6/20/2024   IMPRESSION:  1. Coronary artery calcium score of 0*.  2. Borderline cardiomegaly. Small pericardial effusion.  3. Additional findings as above.    TRANSTHORACIC ECHO: 6/19/24  CONCLUSIONS:   1. The left ventricular systolic function is normal, with a Sadners's biplane calculated ejection fraction of 62%.   2. RVSP within normal limits.     Exercise Stress Test; 6/17/24  Summary:   1. Borderline ischemic ECG changes.   2. Adequate level of stress achieved.    ECG; 5/29/24  Sinus bradycardia  Otherwise normal  "ECG    Patient Specialist/PCP:   PCP: Sherrie Rosas DO 9/4/24 seen for a Medicare Wellness visit, CPE and multiple issues.     Neurosurgery: Parker Francis MD 1/14/25 presents with a diffuse neuropathy of unknown etiology and was referred by neurology for a Sural Nerve and Gastrocnemius muscle biopsy.     Rheumatology: Christina Ely MD 11/22/24 presents for Follow-up (3 mo fuv). Patient with +STEPHANY +SSB with sicca symptoms elevated cpks with normal muscle biopsy, episcleritis. Continues to work with neurology. He has been found to have enlarged nerves and uncertain if this is hereditary/genetic or if it is due to autoimmune.     Neurology: Polina Yan MD 10/29/24 Generalized pain, paresthesias, ? Small fiber neuropathy in setting of Sjogren's (reportedly positive STEPHANY and SSB; negative anti-DS DNA, citrulline antibody, RF - I have not seen these lab results) with sicca symptoms, fibromyalgia, irritable bowel syndrome.     Genetics: Adriana Montalvo MD 10/14/24    Allergy/Immunology: Deejay Garcia MD 6/27/24 presents for Allergy Testing.     Cardiology: Caio Aguilar MD 5/29/24 history of hypertension and mixed dyslipidemia who is referred for chest pain and shortness of breath. His symptoms are atypical for cardiovascular etiology although he does have several risk factors and increases risk for coronary artery disease or heart failure.  Exercise treadmill stress test, echocardiogram, and a coronary artery calcium score ordered and completed.  Risk stratification requested via staff message-  Reply vis Staff Message from Dr. Aguilar on 2/10/25 \"He is low risk. He may proceed\"  Visit Vitals  Smoking Status Never       DASI Risk Score    No data to display       Caprini DVT Assessment    No data to display       Modified Frailty Index    No data to display       CHADS2 Stroke Risk  Current as of 50 minutes ago        N/A 3 to 100%: High Risk   2 to < 3%: Medium Risk   0 to < 2%: Low Risk     " Last Change: N/A          This score determines the patient's risk of having a stroke if the patient has atrial fibrillation.        This score is not applicable to this patient. Components are not calculated.          Revised Cardiac Risk Index    No data to display       Apfel Simplified Score    No data to display       Risk Analysis Index Results This Encounter    No data found in the last 10 encounters.       Prodigy: High Risk  Total Score: 16              Prodigy Gender Score     Prodigy Previous Opioid Use Score      Prodigy SDB Score          ARISCAT Score for Postoperative Pulmonary Complications    No data to display       [place] Perioperative Risk for Myocardial Infarction or Cardiac Arrest (ASTON)    No data to display         Assessment and Plan:   Medication Instructions:  Instructed to hold vitamins, supplements, and NSAIDs 7 days prior to surgery.  Penelope Amaro RN  Pre Admission Testing   {[facility] EMBEDDED ASSESSMENT AND PLAN:[id number]}

## 2025-02-12 ENCOUNTER — PRE-ADMISSION TESTING (OUTPATIENT)
Dept: PREADMISSION TESTING | Facility: HOSPITAL | Age: 45
End: 2025-02-12
Payer: MEDICARE

## 2025-02-12 VITALS
RESPIRATION RATE: 18 BRPM | BODY MASS INDEX: 32.57 KG/M2 | SYSTOLIC BLOOD PRESSURE: 142 MMHG | TEMPERATURE: 98.5 F | WEIGHT: 240.5 LBS | HEIGHT: 72 IN | DIASTOLIC BLOOD PRESSURE: 79 MMHG | HEART RATE: 67 BPM | OXYGEN SATURATION: 97 %

## 2025-02-12 DIAGNOSIS — G62.89 OTHER SPECIFIED POLYNEUROPATHIES: ICD-10-CM

## 2025-02-12 LAB
ANION GAP SERPL CALC-SCNC: 22 MMOL/L (ref 10–20)
BUN SERPL-MCNC: 7 MG/DL (ref 6–23)
CALCIUM SERPL-MCNC: 9 MG/DL (ref 8.6–10.6)
CHLORIDE SERPL-SCNC: 103 MMOL/L (ref 98–107)
CO2 SERPL-SCNC: 20 MMOL/L (ref 21–32)
CREAT SERPL-MCNC: 0.95 MG/DL (ref 0.5–1.3)
EGFRCR SERPLBLD CKD-EPI 2021: >90 ML/MIN/1.73M*2
ERYTHROCYTE [DISTWIDTH] IN BLOOD BY AUTOMATED COUNT: 12.6 % (ref 11.5–14.5)
GLUCOSE SERPL-MCNC: 99 MG/DL (ref 74–99)
HCT VFR BLD AUTO: 42.1 % (ref 41–52)
HGB BLD-MCNC: 16 G/DL (ref 13.5–17.5)
MCH RBC QN AUTO: 34.8 PG (ref 26–34)
MCHC RBC AUTO-ENTMCNC: 38 G/DL (ref 32–36)
MCV RBC AUTO: 92 FL (ref 80–100)
NRBC BLD-RTO: 0 /100 WBCS (ref 0–0)
PLATELET # BLD AUTO: 249 X10*3/UL (ref 150–450)
POTASSIUM SERPL-SCNC: 4.8 MMOL/L (ref 3.5–5.3)
PREALB SERPL-MCNC: 20.9 MG/DL (ref 18–40)
RBC # BLD AUTO: 4.6 X10*6/UL (ref 4.5–5.9)
SODIUM SERPL-SCNC: 140 MMOL/L (ref 136–145)
WBC # BLD AUTO: 5.7 X10*3/UL (ref 4.4–11.3)

## 2025-02-12 PROCEDURE — 84134 ASSAY OF PREALBUMIN: CPT

## 2025-02-12 PROCEDURE — 36415 COLL VENOUS BLD VENIPUNCTURE: CPT

## 2025-02-12 PROCEDURE — 80048 BASIC METABOLIC PNL TOTAL CA: CPT

## 2025-02-12 PROCEDURE — 99204 OFFICE O/P NEW MOD 45 MIN: CPT | Performed by: NURSE PRACTITIONER

## 2025-02-12 PROCEDURE — 85027 COMPLETE CBC AUTOMATED: CPT

## 2025-02-12 ASSESSMENT — ENCOUNTER SYMPTOMS
CONSTITUTIONAL NEGATIVE: 1
ENDOCRINE NEGATIVE: 1
NECK NEGATIVE: 1
GASTROINTESTINAL NEGATIVE: 1
WEAKNESS: 1
RESPIRATORY NEGATIVE: 1
CARDIOVASCULAR NEGATIVE: 1
MUSCULOSKELETAL NEGATIVE: 1
EYES NEGATIVE: 1

## 2025-02-12 ASSESSMENT — DUKE ACTIVITY SCORE INDEX (DASI)
CAN YOU PARTICIPATE IN MODERATE RECREATIONAL ACTIVITIES LIKE GOLF, BOWLING, DANCING, DOUBLES TENNIS OR THROWING A BASEBALL OR FOOTBALL: NO
CAN YOU DO LIGHT WORK AROUND THE HOUSE LIKE DUSTING OR WASHING DISHES: YES
CAN YOU DO MODERATE WORK AROUND THE HOUSE LIKE VACUUMING, SWEEPING FLOORS OR CARRYING GROCERIES: YES
CAN YOU WALK A BLOCK OR TWO ON LEVEL GROUND: YES
CAN YOU TAKE CARE OF YOURSELF (EAT, DRESS, BATHE, OR USE TOILET): YES
CAN YOU WALK INDOORS, SUCH AS AROUND YOUR HOUSE: YES
CAN YOU HAVE SEXUAL RELATIONS: YES
CAN YOU PARTICIPATE IN STRENOUS SPORTS LIKE SWIMMING, SINGLES TENNIS, FOOTBALL, BASKETBALL, OR SKIING: NO
CAN YOU CLIMB A FLIGHT OF STAIRS OR WALK UP A HILL: YES
CAN YOU DO YARD WORK LIKE RAKING LEAVES, WEEDING OR PUSHING A MOWER: NO
CAN YOU DO HEAVY WORK AROUND THE HOUSE LIKE SCRUBBING FLOORS OR LIFTING AND MOVING HEAVY FURNITURE: NO

## 2025-02-12 ASSESSMENT — LIFESTYLE VARIABLES: SMOKING_STATUS: NONSMOKER

## 2025-02-12 NOTE — PREPROCEDURE INSTRUCTIONS
Fasting Guidelines    NPO Instructions:    Do not eat any food after midnight the night before your surgery/procedure.  You may have up to TEN ounces of clear liquids until TWO hours before your instructed arrival time to the hospital. This includes water, black tea/coffee, (no milk or cream), apple juice, and/or electrolyte drinks (Gatorade).  You may chew gum up to TWO hours before your surgery/procedure.    Additional Instructions:    Avoid herbal supplements, multivitamins and NSAIDS (non-steroidal anti-inflammatory drugs) such as Advil, Aleve, Ibuprofen, Naproxen, Excedrin, Meloxicam or Celebrex for at least 7 days prior to surgery. May take Tylenol as needed.    Avoid tobacco and alcohol products for 24 hours prior to surgery.    CONTACT SURGEON'S OFFICE IF YOU DEVELOP:  * Fever = 100.4 F   * New respiratory symptoms (e.g. cough, shortness of breath, respiratory distress, sore throat)  * Recent loss of taste or smell  *Flu like symptoms such as headache, fatigue or gastrointestinal symptoms  * You develop any open sores, shingles, burning or painful urination   AND/OR:  * You no longer wish to have the surgery.  * Any other personal circumstances change that may lead to the need to cancel or defer this surgery.  *You were admitted to any hospital within one week of your planned procedure.    Seven/Six Days before Surgery:  Review your medication instructions, stop indicated medications    Day of Surgery:  Review your medication instructions, take indicated medications  Wear comfortable loose fitting clothing  Do not use moisturizers, creams, lotions or perfume  All jewelry and valuables should be left at home        [facility]  [phone]       Preoperative Brain Exercises    What are brain exercises?  A brain exercise is any activity that engages your thinking (cognitive) skills.    What types of activities are considered brain exercises?  Jigsaw puzzles, crossword puzzles,  word jumble, memory games, word search, and many more.  Many can be found free online or on your phone via a mobile dang.    Why should I do brain exercises before my surgery?  More recent research has shown brain exercise before surgery can lower the risk of postoperative delirium (confusion) which can be especially important for older adults.  Patients who did brain exercises for 5 to 10 hours the days before surgery, cut their risk of postoperative delirium in half up to 1 week after surgery.         [facility]    Preoperative Deep Breathing Exercises    Why it is important to do deep breathing exercises before my surgery?  Deep breathing exercises strengthen your breathing muscles.  This helps you to recover after your surgery and decreases the chance of breathing complications.      How are the deep breathing exercises done?  Sit straight with your back supported.  Breathe in deeply and slowly through your nose. Your lower rib cage should expand and your abdomen may move forward.  Hold that breath for 3 to 5 seconds.  Breathe out through pursed lips, slowly and completely.  Rest and repeat 10 times every hour while awake.  Rest longer if you become dizzy or lightheaded.         Patient and Family Education             Ways You Can Help Prevent Blood Clots             This handout explains some simple things you can do to help prevent blood clots.      Blood clots are blockages that can form in the body's veins. When a blood clot forms in your deep veins, it may be called a deep vein thrombosis, or DVT for short. Blood clots can happen in any part of the body where blood flows, but they are most common in the arms and legs. If a piece of a blood clot breaks free and travels to the lungs, it is called a pulmonary embolus (PE). A PE can be a very serious problem.         Being in the hospital or having surgery can raise your chances of getting a blood clot because you may not be well  enough to move around as much as you normally do.         Ways you can help prevent blood clots in the hospital         Wearing SCDs. SCDs stands for Sequential Compression Devices.   SCDs are special sleeves that wrap around your legs  They attach to a pump that fills them with air to gently squeeze your legs every few minutes.   This helps return the blood in your legs to your heart.   SCDs should only be taken off when walking or bathing.   SCDs may not be comfortable, but they can help save your life.               Wearing compression stockings - if your doctor orders them. These special snug fitting stockings gently squeeze your legs to help blood flow.       Walking. Walking helps move the blood in your legs.   If your doctor says it is ok, try walking the halls at least   5 times a day. Ask us to help you get up, so you don't fall.      Taking any blood thinning medicines your doctor orders.        Page 1 of 2     [facility]; 3/23   Ways you can help prevent blood clots at home       Wearing compression stockings - if your doctor orders them. ? Walking - to help move the blood in your legs.       Taking any blood thinning medicines your doctor orders.      Signs of a blood clot or PE      Tell your doctor or nurse know right away if you have of the problems listed below.    If you are at home, seek medical care right away. Call 911 for chest pain or problems breathing.               Signs of a blood clot (DVT) - such as pain,  swelling, redness or warmth in your arm or leg      Signs of a pulmonary embolism (PE) - such as chest     pain or feeling short of breath

## 2025-02-12 NOTE — H&P (VIEW-ONLY)
CPM/PAT Evaluation       Name: Luis Condon  (Luis ALLISON MillardRoseannaalexsander Butler)  /Age: 1980/44 y.o.     Visit Type:   In-Person       Chief Complaint: Other specified polyneuropathies    HPI  Patient is a 44-year-old male with neuropathy of unknown etiology being evaluated in [facility] in anticipation of a left Sural Nerve and Gastrocnemius Muscle Biopsy with Dr. Francis on 25.  Past Medical History:   Diagnosis Date    Anxiety     Atypical chest pain     intermittent- workup completed with bo    Carpal tunnel syndrome     Chemotherapy induced nausea and vomiting 2023    Chronic allergic conjunctivitis     Chronic pain disorder     Depression     Disorder of muscle, unspecified 04/10/2018    Disorder of muscle, unspecified    Disorder of prostate, unspecified     Prostate disease    Fibromyalgia, primary     Hiccough 2020    Intractable hiccups    Hypertension     Male erectile dysfunction, unspecified 2013    Organic impotence    Moderate persistent asthma 2023    Unspecified whether complicated      Neuropathy     Other instability, unspecified ankle 2019    Ankle instability    Other meniscus derangements, unspecified medial meniscus, unspecified knee 2018    Derangement of medial meniscus    Personal history of diseases of the skin and subcutaneous tissue 2019    History of dermatitis    Personal history of other diseases of the nervous system and sense organs     History of migraine with aura    Personal history of other diseases of the respiratory system     Personal history of asthma    Personal history of other diseases of urinary system     History of bladder problems    Personal history of other endocrine, nutritional and metabolic disease 2016    History of goiter    Personal history of other mental and behavioral disorders     History of depression    Pure hypercholesterolemia, unspecified     High cholesterol    Sjogren syndrome, unspecified (Multi)      History of Sjogren's disease    Sleep apnea, unspecified 2024    Mild MARY JO- not on cpap    Snoring     Snoring       Past Surgical History:   Procedure Laterality Date    ADENOIDECTOMY      COLONOSCOPY      CYST REMOVAL      index    ESOPHAGOGASTRODUODENOSCOPY      KNEE SURGERY Left     multiple domingo surgery    TONSILLECTOMY      Tonsillectomy       Patient  has no history on file for sexual activity.    Family History   Problem Relation Name Age of Onset    Other (Cerebrovascular accident) Mother      Other (Diabetes mellitus) Mother      Hypertension Mother      Heart attack Mother      Coronary artery disease Mother          CAD- 42    Asthma Father      Other (Diabetes mellitus) Father      Hypertension Father      Crohn's disease Brother          x 2    Stroke Maternal Grandfather      Stroke Paternal Grandmother      Lung cancer Paternal Grandfather      Asthma Other Grandmother     Hypertension Other Grandmother        Allergies   Allergen Reactions    Sulfa (Sulfonamide Antibiotics) Hives and Rash    Bee Pollen Unknown    Cat Dander Unknown    Grass Pollen Itching    House Dust Unknown    Other Unknown     Grass       Prior to Admission medications    Medication Sig Start Date End Date Taking? Authorizing Provider   albuterol (ProAir HFA) 90 mcg/actuation inhaler Inhale 2 puffs every 4 hours if needed for wheezing or shortness of breath. 24  Sherrie Rosas, DO   albuterol 2.5 mg /3 mL (0.083 %) nebulizer solution Inhale 3 mL (2.5 mg) every 6 hours if needed.    Historical Provider, MD   ALPRAZolam (Xanax) 2 mg tablet Take 1 tablet (2 mg) by mouth 4 times a day as needed.    Historical Provider, MD   azelastine (Astelin) 137 mcg (0.1 %) nasal spray Administer 2 sprays into each nostril 2 times a day. Use in each nostril as directed 24   Sherrie Rosas,    buPROPion (Wellbutrin) 75 mg tablet Take 2 tablets (150 mg) by mouth 2 times a day. 23   Historical Provider, MD    cetirizine (ZyrTEC) 10 mg tablet Take 1 tablet (10 mg) by mouth once daily.    Historical Provider, MD   cloNIDine (Catapres) 0.1 mg tablet Take 1 tablet (0.1 mg) by mouth 3 times a day. 5/16/24   Historical Provider, MD   cyclobenzaprine (Flexeril) 10 mg tablet Take 1 tablet (10 mg) by mouth 2 times a day. 8/8/24 8/8/25  Christina Ely MD   doxycycline (Vibramycin) 100 mg capsule TAKE ONE PILL DAILY WITH A FULL GLASS OF WATER AND FOOD.  Patient not taking: Reported on 2/10/2025 12/17/24   Historical Provider, MD   EPINEPHrine 0.3 mg/0.3 mL injection syringe Inject 0.3 mL (0.3 mg) into the muscle 1 time if needed for anaphylaxis for up to 1 dose. As directed 6/27/24   Deejay Garcia MD   fluticasone (Flonase) 50 mcg/actuation nasal spray Administer 2 sprays into each nostril once daily. Shake gently. Before first use, prime pump. After use, clean tip and replace cap. 9/4/24 3/3/25  Sherrie Rosas DO   fluticasone (Flovent) 220 mcg/actuation inhaler Inhale 2 puffs 2 times a day. Rinse mouth after use  Patient not taking: Reported on 2/10/2025 2/17/20   Historical Provider, MD   gabapentin (Neurontin) 100 mg capsule TAKE 1 CAPSULE BY MOUTH THREE TIMES A DAY 11/25/24   Polina Yan MD PhD   gabapentin (Neurontin) 600 mg tablet TAKE 3 TABLETS BY MOUTH 3 TIMES DAILY AS DIRECTED 12/18/24   Polina Yan MD PhD   hydrOXYzine pamoate (Vistaril) 25 mg capsule TAKE 3-4 CAPSULES BY MOUTH AT BEDTIME AS NEEDED 5/9/24   Historical Provider, MD   icosapent ethyL (Vascepa) 1 gram capsule Take 2 capsules (2 g) by mouth 2 times daily (morning and late afternoon). 9/4/24 3/3/25  Sherrie Rosas DO   ketoconazole (NIZOral) 2 % shampoo PLEASE SEE ATTACHED FOR DETAILED DIRECTIONS 12/17/24   Historical Provider, MD   lisinopril 20 mg tablet Take 1 tablet (20 mg) by mouth once daily. 9/4/24 3/3/25  Sherrie Rosas DO   lubiprostone (Amitiza) 24 mcg capsule Take 1 capsule (24 mcg) by mouth 2 times a day. With  food 9/11/24 3/10/25  Sherrie Rosas DO   meloxicam (Mobic) 15 mg tablet TAKE 1 TABLET BY MOUTH EVERY DAY AS NEEDED 10/8/24   Christina Ely MD   methylPREDNISolone (Medrol Dospak) 4 mg tablets TAKE 6 TABLETS ON DAY 1 AS DIRECTED ON PACKAGE AND DECREASE BY 1 TAB EACH DAY FOR A TOTAL OF 6 DAYS 8/8/24   Christina Ely MD   mirtazapine (Remeron) 30 mg tablet Take 2 tablets (60 mg) by mouth once daily at bedtime.    Historical Provider, MD   multivitamin tablet Take 1 tablet by mouth once daily.    Historical Provider, MD   naloxone (Narcan) 4 mg/0.1 mL nasal spray Administer 1 spray (4 mg) into affected nostril(s). 1 actuation in one nostril x1, may repeat dose q2-3min until pt responsive or EMS arrives    Historical Provider, MD   omeprazole (PriLOSEC) 40 mg DR capsule Take 1 capsule (40 mg) by mouth once daily in the morning. Take before meals. Do not crush or chew. 3/21/24 9/17/24  Sherrie Rosas DO   oxyCODONE (Roxicodone) 10 mg immediate release tablet Take 1 tablet (10 mg) by mouth every 6 hours if needed for severe pain (7 - 10). 1/30/25   Christina Ely MD   PARoxetine (Paxil) 20 mg tablet Take 1 tablet (20 mg) by mouth once daily in the morning.    Historical Provider, MD   propranolol (Inderal) 10 mg tablet Take 1 tablet (10 mg) by mouth 2 times a day as needed (anxiety).    Historical Provider, MD   tazarotene (Tazorac) 0.1 % cream APPLY PEA SIZED AMOUNT TOPICALLY TO FACE 2-3 NIGHTS A WEEK 12/23/24   Historical Provider, MD   TURMERIC ORAL Turmeric 500 MG Oral Capsule    Historical Provider, MD   ZINC ORAL Take by mouth once daily. Zinc 100 MG Oral Tablet    Historical Provider, MD        PAT ROS:   Constitutional:   neg    Neuro/Psych:    weakness (generalized throughout body)  Eyes:   neg    Ears:   neg    Nose:   neg    Mouth:   neg    Throat:   neg    Neck:   neg    Cardio:   neg    Respiratory:   neg    Endocrine:   neg    GI:   neg    :   neg    Musculoskeletal:   neg    Hematologic:    Skin:  neg        Physical Exam  Vitals reviewed.   Constitutional:       Appearance: Normal appearance.   HENT:      Head: Normocephalic and atraumatic.      Nose: Nose normal.   Eyes:      Pupils: Pupils are equal, round, and reactive to light.   Cardiovascular:      Rate and Rhythm: Normal rate.      Pulses: Normal pulses.      Heart sounds: Normal heart sounds.   Pulmonary:      Effort: Pulmonary effort is normal.      Breath sounds: Normal breath sounds.   Abdominal:      Palpations: Abdomen is soft.   Musculoskeletal:         General: Normal range of motion.      Cervical back: Normal range of motion.   Skin:     General: Skin is warm.   Neurological:      General: No focal deficit present.      Mental Status: He is alert and oriented to person, place, and time.   Psychiatric:         Mood and Affect: Mood normal.         Behavior: Behavior normal.          PAT AIRWAY:   Airway:     Mallampati::  II    TM distance::  >3 FB    Neck ROM::  Full  normal        Testing/Diagnostic:     Patient Specialist/PCP:     Visit Vitals  Smoking Status Never       DASI Risk Score    No data to display       Caprini DVT Assessment    No data to display       Modified Frailty Index    No data to display       CHADS2 Stroke Risk  Current as of yesterday        N/A 3 to 100%: High Risk   2 to < 3%: Medium Risk   0 to < 2%: Low Risk     Last Change: N/A          This score determines the patient's risk of having a stroke if the patient has atrial fibrillation.        This score is not applicable to this patient. Components are not calculated.          Revised Cardiac Risk Index    No data to display       Apfel Simplified Score    No data to display       Risk Analysis Index Results This Encounter    No data found in the last 10 encounters.       Stop Bang Score      Flowsheet Row Pre-Admission Testing from 2/12/2025 in [facility]   Do you snore loudly? 0 filed at 02/12/2025 0748   Do you often feel tired or  fatigued after your sleep? 1 filed at 02/12/2025 0748   Has anyone ever observed you stop breathing in your sleep? 0 filed at 02/12/2025 0748   Do you have or are you being treated for high blood pressure? 1 filed at 02/12/2025 0748   Recent BMI (Calculated) 33.5 filed at 02/12/2025 0748   Is BMI greater than 35 kg/m2? 0=No filed at 02/12/2025 0748   Age older than 50 years old? 0=No filed at 02/12/2025 0748   Is your neck circumference greater than 17 inches (Male) or 16 inches (Female)? 0 filed at 02/12/2025 0748   Gender - Male 1=Yes filed at 02/12/2025 0748   STOP-BANG Total Score 3 filed at 02/12/2025 0748          Prodigy: High Risk  Total Score: 16              Prodigy Gender Score     Prodigy Previous Opioid Use Score      Prodigy SDB Score          ARISCAT Score for Postoperative Pulmonary Complications    No data to display       [place] Perioperative Risk for Myocardial Infarction or Cardiac Arrest (ASTON)    No data to display         Assessment and Plan:     Anesthesia  The patient denies problems with anesthesia in the past such as PONV, prolonged sedation, awareness, dental damage, aspiration, cardiac arrest, difficult intubation, or unexpected hospital admissions.     Neurology  The patient has neuropathy of unknown etiology, anxiety, depression and PTSD. The patient is at increased risk for postoperative delirium secondary to depression, sensory Impairment. The patient is at increased risk for perioperative stroke secondary to hypertension , hyperlipidemia, general anesthesia, operative time >2.5 hours.    HEENT/Airway  No diagnoses, significant findings on chart review, clinical presentation, or evaluation. No documented or reported history of airway difficulty.     Cardiovascular  The patient is scheduled for non-cardiac surgery associated with elevated risk. The patient has no major cardiac contraindications to non- cardiac surgery.  RCRI  The patient meets 0 RCRI criteria and therefor has a 3.9%  risk of major adverse cardiac complications.  METS  The patient's functional capacity is greater than 4 METS.  EKG  The patient has no EKG or echocardiographic changes concerning for myocardial ischemia.   Heart Failure  The patient has no known history of heart failure.  Additionally, the patient reports no symptoms of heart failure and demonstrates no signs of heart failure.  Hypertension Evaluation  The patient has a known history of hypertension that is controlled.  Patient's hypertension is most consistent with stage 1.  Heart Rhythm Evaluation  The patient has no history of arrhythmias.  Heart Valve Evaluation  The patient has no known history of valvular heart disease. The patient has no symptoms or physical exam findings to suggest valvular heart disease.  Cardiology Evaluation  The patient follows with cardiology, Dr. Aguilar. Patient was last seen 5-29-24. Per note,  Pt is low risk and may proceed    The patient has a 30-day risk for MACE of 0 predictors, 3.9% risk for cardiac death, nonfatal myocardial infarction, and nonfatal cardiac arrest.  ASTON score which indicates a 0.1% risk of intraoperative or 30-day postoperative MACE (major adverse cardiac event).    Pulmonary  The patient has asthma and mild MARY JO no CPAP.    The patient has a stop bang score of 3, which places patient at intermediate risk for having MARY JO.    ARISCAT 16, low, 1.6% risk of in-hospital postoperative pulmonary complications  PRODIGY 13, intermediate risk of respiratory depression episode. Patient given PI sheet for preoperative deep breathing exercises.    Hematology  No diagnoses or significant findings on chart review or clinical presentation and evaluation.  Antiplatelet management   The patient is not currently receiving antiplatelet therapy.  Anticoagulation management  The patient is not currently receiving anticoagulation therapy.    Caprini score 7, high risk of perioperative VTE.     Patient instructed to ambulate as soon  as possible postoperatively to decrease thromboembolic risk. Initiate mechanical DVT prophylaxis as soon as possible and initiate chemical prophylaxis when deemed safe from a bleeding standpoint post surgery.     Transfusion Evaluation  T&S not obtained. Low likelihood for perioperative transfusion of blood or blood products.    Gastrointestinal  No diagnoses or significant findings on chart review or clinical presentation and evaluation.    Eat 10- 0,  self-perceived oropharyngeal dysphagia scale (0-40)     Genitourinary  No diagnoses or significant findings on chart review or clinical presentation and evaluation.    Renal  No renal diagnoses or significant findings on chart review or clinical presentation and evaluation. The patient has specific risk factors associated with increased risk of perioperative renal complications related to male gender, hypertension.    Musculoskeletal  The patient has osteoarthritis, fibromyalgia and chronic pain    Endocrine  Diabetes Evaluation  The patient has no history of diabetes mellitus.  Thyroid Disease Evaluation  The patient has no history of thyroid disease.    ID  No diagnoses or significant findings on chart review or clinical presentation and evaluation.    -Preoperative medication instructions were provided and reviewed with the patient.  Any additional testing or evaluation was explained to the patient.  NPO Instructions were discussed, and the patient's questions were answered prior to conclusion of this encounter. Patient verbalized understanding of preoperative instructions. After Visit Summary given.      Recent Results (from the past 48 hours)   Basic Metabolic Panel    Collection Time: 02/12/25  8:12 AM   Result Value Ref Range    Glucose 99 74 - 99 mg/dL    Sodium 140 136 - 145 mmol/L    Potassium 4.8 3.5 - 5.3 mmol/L    Chloride 103 98 - 107 mmol/L    Bicarbonate 20 (L) 21 - 32 mmol/L    Anion Gap 22 (H) 10 - 20 mmol/L    Urea Nitrogen 7 6 - 23 mg/dL     Creatinine 0.95 0.50 - 1.30 mg/dL    eGFR >90 >60 mL/min/1.73m*2    Calcium 9.0 8.6 - 10.6 mg/dL   Prealbumin    Collection Time: 02/12/25  8:12 AM   Result Value Ref Range    Prealbumin 20.9 18.0 - 40.0 mg/dL   CBC    Collection Time: 02/12/25  8:12 AM   Result Value Ref Range    WBC 5.7 4.4 - 11.3 x10*3/uL    nRBC 0.0 0.0 - 0.0 /100 WBCs    RBC 4.60 4.50 - 5.90 x10*6/uL    Hemoglobin 16.0 13.5 - 17.5 g/dL    Hematocrit 42.1 41.0 - 52.0 %    MCV 92 80 - 100 fL    MCH 34.8 (H) 26.0 - 34.0 pg    MCHC 38.0 (H) 32.0 - 36.0 g/dL    RDW 12.6 11.5 - 14.5 %    Platelets 249 150 - 450 x10*3/uL

## 2025-02-12 NOTE — CPM/PAT H&P
CPM/PAT Evaluation       Name: Luis Condon  (Luis ALLISON MillardRoseannaalexsander Butler)  /Age: 1980/44 y.o.     Visit Type:   In-Person       Chief Complaint: Other specified polyneuropathies    HPI  Patient is a 44-year-old male with neuropathy of unknown etiology being evaluated in [facility] in anticipation of a left Sural Nerve and Gastrocnemius Muscle Biopsy with Dr. Francis on 25.  Past Medical History:   Diagnosis Date    Anxiety     Atypical chest pain     intermittent- workup completed with bo    Carpal tunnel syndrome     Chemotherapy induced nausea and vomiting 2023    Chronic allergic conjunctivitis     Chronic pain disorder     Depression     Disorder of muscle, unspecified 04/10/2018    Disorder of muscle, unspecified    Disorder of prostate, unspecified     Prostate disease    Fibromyalgia, primary     Hiccough 2020    Intractable hiccups    Hypertension     Male erectile dysfunction, unspecified 2013    Organic impotence    Moderate persistent asthma 2023    Unspecified whether complicated      Neuropathy     Other instability, unspecified ankle 2019    Ankle instability    Other meniscus derangements, unspecified medial meniscus, unspecified knee 2018    Derangement of medial meniscus    Personal history of diseases of the skin and subcutaneous tissue 2019    History of dermatitis    Personal history of other diseases of the nervous system and sense organs     History of migraine with aura    Personal history of other diseases of the respiratory system     Personal history of asthma    Personal history of other diseases of urinary system     History of bladder problems    Personal history of other endocrine, nutritional and metabolic disease 2016    History of goiter    Personal history of other mental and behavioral disorders     History of depression    Pure hypercholesterolemia, unspecified     High cholesterol    Sjogren syndrome, unspecified (Multi)      History of Sjogren's disease    Sleep apnea, unspecified 2024    Mild MARY JO- not on cpap    Snoring     Snoring       Past Surgical History:   Procedure Laterality Date    ADENOIDECTOMY      COLONOSCOPY      CYST REMOVAL      index    ESOPHAGOGASTRODUODENOSCOPY      KNEE SURGERY Left     multiple domingo surgery    TONSILLECTOMY      Tonsillectomy       Patient  has no history on file for sexual activity.    Family History   Problem Relation Name Age of Onset    Other (Cerebrovascular accident) Mother      Other (Diabetes mellitus) Mother      Hypertension Mother      Heart attack Mother      Coronary artery disease Mother          CAD- 42    Asthma Father      Other (Diabetes mellitus) Father      Hypertension Father      Crohn's disease Brother          x 2    Stroke Maternal Grandfather      Stroke Paternal Grandmother      Lung cancer Paternal Grandfather      Asthma Other Grandmother     Hypertension Other Grandmother        Allergies   Allergen Reactions    Sulfa (Sulfonamide Antibiotics) Hives and Rash    Bee Pollen Unknown    Cat Dander Unknown    Grass Pollen Itching    House Dust Unknown    Other Unknown     Grass       Prior to Admission medications    Medication Sig Start Date End Date Taking? Authorizing Provider   albuterol (ProAir HFA) 90 mcg/actuation inhaler Inhale 2 puffs every 4 hours if needed for wheezing or shortness of breath. 24  Sherrie Rosas, DO   albuterol 2.5 mg /3 mL (0.083 %) nebulizer solution Inhale 3 mL (2.5 mg) every 6 hours if needed.    Historical Provider, MD   ALPRAZolam (Xanax) 2 mg tablet Take 1 tablet (2 mg) by mouth 4 times a day as needed.    Historical Provider, MD   azelastine (Astelin) 137 mcg (0.1 %) nasal spray Administer 2 sprays into each nostril 2 times a day. Use in each nostril as directed 24   Sherrie Rosas,    buPROPion (Wellbutrin) 75 mg tablet Take 2 tablets (150 mg) by mouth 2 times a day. 23   Historical Provider, MD    cetirizine (ZyrTEC) 10 mg tablet Take 1 tablet (10 mg) by mouth once daily.    Historical Provider, MD   cloNIDine (Catapres) 0.1 mg tablet Take 1 tablet (0.1 mg) by mouth 3 times a day. 5/16/24   Historical Provider, MD   cyclobenzaprine (Flexeril) 10 mg tablet Take 1 tablet (10 mg) by mouth 2 times a day. 8/8/24 8/8/25  Christina Ely MD   doxycycline (Vibramycin) 100 mg capsule TAKE ONE PILL DAILY WITH A FULL GLASS OF WATER AND FOOD.  Patient not taking: Reported on 2/10/2025 12/17/24   Historical Provider, MD   EPINEPHrine 0.3 mg/0.3 mL injection syringe Inject 0.3 mL (0.3 mg) into the muscle 1 time if needed for anaphylaxis for up to 1 dose. As directed 6/27/24   Deejay Garcia MD   fluticasone (Flonase) 50 mcg/actuation nasal spray Administer 2 sprays into each nostril once daily. Shake gently. Before first use, prime pump. After use, clean tip and replace cap. 9/4/24 3/3/25  Sherrie Rosas DO   fluticasone (Flovent) 220 mcg/actuation inhaler Inhale 2 puffs 2 times a day. Rinse mouth after use  Patient not taking: Reported on 2/10/2025 2/17/20   Historical Provider, MD   gabapentin (Neurontin) 100 mg capsule TAKE 1 CAPSULE BY MOUTH THREE TIMES A DAY 11/25/24   Polina Yan MD PhD   gabapentin (Neurontin) 600 mg tablet TAKE 3 TABLETS BY MOUTH 3 TIMES DAILY AS DIRECTED 12/18/24   Polina Yan MD PhD   hydrOXYzine pamoate (Vistaril) 25 mg capsule TAKE 3-4 CAPSULES BY MOUTH AT BEDTIME AS NEEDED 5/9/24   Historical Provider, MD   icosapent ethyL (Vascepa) 1 gram capsule Take 2 capsules (2 g) by mouth 2 times daily (morning and late afternoon). 9/4/24 3/3/25  Sherrie Rosas DO   ketoconazole (NIZOral) 2 % shampoo PLEASE SEE ATTACHED FOR DETAILED DIRECTIONS 12/17/24   Historical Provider, MD   lisinopril 20 mg tablet Take 1 tablet (20 mg) by mouth once daily. 9/4/24 3/3/25  Sherrie Rosas DO   lubiprostone (Amitiza) 24 mcg capsule Take 1 capsule (24 mcg) by mouth 2 times a day. With  food 9/11/24 3/10/25  Sherrie Rosas DO   meloxicam (Mobic) 15 mg tablet TAKE 1 TABLET BY MOUTH EVERY DAY AS NEEDED 10/8/24   Christina Ely MD   methylPREDNISolone (Medrol Dospak) 4 mg tablets TAKE 6 TABLETS ON DAY 1 AS DIRECTED ON PACKAGE AND DECREASE BY 1 TAB EACH DAY FOR A TOTAL OF 6 DAYS 8/8/24   Christina Ely MD   mirtazapine (Remeron) 30 mg tablet Take 2 tablets (60 mg) by mouth once daily at bedtime.    Historical Provider, MD   multivitamin tablet Take 1 tablet by mouth once daily.    Historical Provider, MD   naloxone (Narcan) 4 mg/0.1 mL nasal spray Administer 1 spray (4 mg) into affected nostril(s). 1 actuation in one nostril x1, may repeat dose q2-3min until pt responsive or EMS arrives    Historical Provider, MD   omeprazole (PriLOSEC) 40 mg DR capsule Take 1 capsule (40 mg) by mouth once daily in the morning. Take before meals. Do not crush or chew. 3/21/24 9/17/24  Sherrie Rosas DO   oxyCODONE (Roxicodone) 10 mg immediate release tablet Take 1 tablet (10 mg) by mouth every 6 hours if needed for severe pain (7 - 10). 1/30/25   Christina Ely MD   PARoxetine (Paxil) 20 mg tablet Take 1 tablet (20 mg) by mouth once daily in the morning.    Historical Provider, MD   propranolol (Inderal) 10 mg tablet Take 1 tablet (10 mg) by mouth 2 times a day as needed (anxiety).    Historical Provider, MD   tazarotene (Tazorac) 0.1 % cream APPLY PEA SIZED AMOUNT TOPICALLY TO FACE 2-3 NIGHTS A WEEK 12/23/24   Historical Provider, MD   TURMERIC ORAL Turmeric 500 MG Oral Capsule    Historical Provider, MD   ZINC ORAL Take by mouth once daily. Zinc 100 MG Oral Tablet    Historical Provider, MD        PAT ROS:   Constitutional:   neg    Neuro/Psych:    weakness (generalized throughout body)  Eyes:   neg    Ears:   neg    Nose:   neg    Mouth:   neg    Throat:   neg    Neck:   neg    Cardio:   neg    Respiratory:   neg    Endocrine:   neg    GI:   neg    :   neg    Musculoskeletal:   neg    Hematologic:    Skin:  neg        Physical Exam  Vitals reviewed.   Constitutional:       Appearance: Normal appearance.   HENT:      Head: Normocephalic and atraumatic.      Nose: Nose normal.   Eyes:      Pupils: Pupils are equal, round, and reactive to light.   Cardiovascular:      Rate and Rhythm: Normal rate.      Pulses: Normal pulses.      Heart sounds: Normal heart sounds.   Pulmonary:      Effort: Pulmonary effort is normal.      Breath sounds: Normal breath sounds.   Abdominal:      Palpations: Abdomen is soft.   Musculoskeletal:         General: Normal range of motion.      Cervical back: Normal range of motion.   Skin:     General: Skin is warm.   Neurological:      General: No focal deficit present.      Mental Status: He is alert and oriented to person, place, and time.   Psychiatric:         Mood and Affect: Mood normal.         Behavior: Behavior normal.          PAT AIRWAY:   Airway:     Mallampati::  II    TM distance::  >3 FB    Neck ROM::  Full  normal        Testing/Diagnostic:     Patient Specialist/PCP:     Visit Vitals  Smoking Status Never       DASI Risk Score    No data to display       Caprini DVT Assessment    No data to display       Modified Frailty Index    No data to display       CHADS2 Stroke Risk  Current as of yesterday        N/A 3 to 100%: High Risk   2 to < 3%: Medium Risk   0 to < 2%: Low Risk     Last Change: N/A          This score determines the patient's risk of having a stroke if the patient has atrial fibrillation.        This score is not applicable to this patient. Components are not calculated.          Revised Cardiac Risk Index    No data to display       Apfel Simplified Score    No data to display       Risk Analysis Index Results This Encounter    No data found in the last 10 encounters.       Stop Bang Score      Flowsheet Row Pre-Admission Testing from 2/12/2025 in [facility]   Do you snore loudly? 0 filed at 02/12/2025 0748   Do you often feel tired or  fatigued after your sleep? 1 filed at 02/12/2025 0748   Has anyone ever observed you stop breathing in your sleep? 0 filed at 02/12/2025 0748   Do you have or are you being treated for high blood pressure? 1 filed at 02/12/2025 0748   Recent BMI (Calculated) 33.5 filed at 02/12/2025 0748   Is BMI greater than 35 kg/m2? 0=No filed at 02/12/2025 0748   Age older than 50 years old? 0=No filed at 02/12/2025 0748   Is your neck circumference greater than 17 inches (Male) or 16 inches (Female)? 0 filed at 02/12/2025 0748   Gender - Male 1=Yes filed at 02/12/2025 0748   STOP-BANG Total Score 3 filed at 02/12/2025 0748          Prodigy: High Risk  Total Score: 16              Prodigy Gender Score     Prodigy Previous Opioid Use Score      Prodigy SDB Score          ARISCAT Score for Postoperative Pulmonary Complications    No data to display       [place] Perioperative Risk for Myocardial Infarction or Cardiac Arrest (ASTON)    No data to display         Assessment and Plan:     Anesthesia  The patient denies problems with anesthesia in the past such as PONV, prolonged sedation, awareness, dental damage, aspiration, cardiac arrest, difficult intubation, or unexpected hospital admissions.     Neurology  The patient has neuropathy of unknown etiology, anxiety, depression and PTSD. The patient is at increased risk for postoperative delirium secondary to depression, sensory Impairment. The patient is at increased risk for perioperative stroke secondary to hypertension , hyperlipidemia, general anesthesia, operative time >2.5 hours.    HEENT/Airway  No diagnoses, significant findings on chart review, clinical presentation, or evaluation. No documented or reported history of airway difficulty.     Cardiovascular  The patient is scheduled for non-cardiac surgery associated with elevated risk. The patient has no major cardiac contraindications to non- cardiac surgery.  RCRI  The patient meets 0 RCRI criteria and therefor has a 3.9%  risk of major adverse cardiac complications.  METS  The patient's functional capacity is greater than 4 METS.  EKG  The patient has no EKG or echocardiographic changes concerning for myocardial ischemia.   Heart Failure  The patient has no known history of heart failure.  Additionally, the patient reports no symptoms of heart failure and demonstrates no signs of heart failure.  Hypertension Evaluation  The patient has a known history of hypertension that is controlled.  Patient's hypertension is most consistent with stage 1.  Heart Rhythm Evaluation  The patient has no history of arrhythmias.  Heart Valve Evaluation  The patient has no known history of valvular heart disease. The patient has no symptoms or physical exam findings to suggest valvular heart disease.  Cardiology Evaluation  The patient follows with cardiology, Dr. Aguilar. Patient was last seen 5-29-24. Per note,  Pt is low risk and may proceed    The patient has a 30-day risk for MACE of 0 predictors, 3.9% risk for cardiac death, nonfatal myocardial infarction, and nonfatal cardiac arrest.  ASTON score which indicates a 0.1% risk of intraoperative or 30-day postoperative MACE (major adverse cardiac event).    Pulmonary  The patient has asthma and mild MARY JO no CPAP.    The patient has a stop bang score of 3, which places patient at intermediate risk for having MARY JO.    ARISCAT 16, low, 1.6% risk of in-hospital postoperative pulmonary complications  PRODIGY 13, intermediate risk of respiratory depression episode. Patient given PI sheet for preoperative deep breathing exercises.    Hematology  No diagnoses or significant findings on chart review or clinical presentation and evaluation.  Antiplatelet management   The patient is not currently receiving antiplatelet therapy.  Anticoagulation management  The patient is not currently receiving anticoagulation therapy.    Caprini score 7, high risk of perioperative VTE.     Patient instructed to ambulate as soon  as possible postoperatively to decrease thromboembolic risk. Initiate mechanical DVT prophylaxis as soon as possible and initiate chemical prophylaxis when deemed safe from a bleeding standpoint post surgery.     Transfusion Evaluation  T&S not obtained. Low likelihood for perioperative transfusion of blood or blood products.    Gastrointestinal  No diagnoses or significant findings on chart review or clinical presentation and evaluation.    Eat 10- 0,  self-perceived oropharyngeal dysphagia scale (0-40)     Genitourinary  No diagnoses or significant findings on chart review or clinical presentation and evaluation.    Renal  No renal diagnoses or significant findings on chart review or clinical presentation and evaluation. The patient has specific risk factors associated with increased risk of perioperative renal complications related to male gender, hypertension.    Musculoskeletal  The patient has osteoarthritis, fibromyalgia and chronic pain    Endocrine  Diabetes Evaluation  The patient has no history of diabetes mellitus.  Thyroid Disease Evaluation  The patient has no history of thyroid disease.    ID  No diagnoses or significant findings on chart review or clinical presentation and evaluation.    -Preoperative medication instructions were provided and reviewed with the patient.  Any additional testing or evaluation was explained to the patient.  NPO Instructions were discussed, and the patient's questions were answered prior to conclusion of this encounter. Patient verbalized understanding of preoperative instructions. After Visit Summary given.      Recent Results (from the past 48 hours)   Basic Metabolic Panel    Collection Time: 02/12/25  8:12 AM   Result Value Ref Range    Glucose 99 74 - 99 mg/dL    Sodium 140 136 - 145 mmol/L    Potassium 4.8 3.5 - 5.3 mmol/L    Chloride 103 98 - 107 mmol/L    Bicarbonate 20 (L) 21 - 32 mmol/L    Anion Gap 22 (H) 10 - 20 mmol/L    Urea Nitrogen 7 6 - 23 mg/dL     Creatinine 0.95 0.50 - 1.30 mg/dL    eGFR >90 >60 mL/min/1.73m*2    Calcium 9.0 8.6 - 10.6 mg/dL   Prealbumin    Collection Time: 02/12/25  8:12 AM   Result Value Ref Range    Prealbumin 20.9 18.0 - 40.0 mg/dL   CBC    Collection Time: 02/12/25  8:12 AM   Result Value Ref Range    WBC 5.7 4.4 - 11.3 x10*3/uL    nRBC 0.0 0.0 - 0.0 /100 WBCs    RBC 4.60 4.50 - 5.90 x10*6/uL    Hemoglobin 16.0 13.5 - 17.5 g/dL    Hematocrit 42.1 41.0 - 52.0 %    MCV 92 80 - 100 fL    MCH 34.8 (H) 26.0 - 34.0 pg    MCHC 38.0 (H) 32.0 - 36.0 g/dL    RDW 12.6 11.5 - 14.5 %    Platelets 249 150 - 450 x10*3/uL

## 2025-02-13 ENCOUNTER — ANESTHESIA EVENT (OUTPATIENT)
Dept: OPERATING ROOM | Facility: HOSPITAL | Age: 45
End: 2025-02-13
Payer: MEDICARE

## 2025-02-14 ENCOUNTER — ANESTHESIA (OUTPATIENT)
Dept: OPERATING ROOM | Facility: HOSPITAL | Age: 45
End: 2025-02-14
Payer: MEDICARE

## 2025-02-14 ENCOUNTER — HOSPITAL ENCOUNTER (OUTPATIENT)
Facility: HOSPITAL | Age: 45
Setting detail: OUTPATIENT SURGERY
Discharge: HOME | End: 2025-02-14
Attending: STUDENT IN AN ORGANIZED HEALTH CARE EDUCATION/TRAINING PROGRAM | Admitting: STUDENT IN AN ORGANIZED HEALTH CARE EDUCATION/TRAINING PROGRAM
Payer: MEDICARE

## 2025-02-14 VITALS
SYSTOLIC BLOOD PRESSURE: 159 MMHG | HEART RATE: 66 BPM | OXYGEN SATURATION: 98 % | DIASTOLIC BLOOD PRESSURE: 80 MMHG | TEMPERATURE: 96.8 F | RESPIRATION RATE: 16 BRPM | BODY MASS INDEX: 33.33 KG/M2 | WEIGHT: 238.1 LBS | HEIGHT: 71 IN

## 2025-02-14 DIAGNOSIS — G62.89 OTHER SPECIFIED POLYNEUROPATHIES: Primary | ICD-10-CM

## 2025-02-14 PROBLEM — R56.9 SEIZURES (MULTI): Status: ACTIVE | Noted: 2025-02-14

## 2025-02-14 PROCEDURE — 88313 SPECIAL STAINS GROUP 2: CPT | Mod: TC,SUR | Performed by: STUDENT IN AN ORGANIZED HEALTH CARE EDUCATION/TRAINING PROGRAM

## 2025-02-14 PROCEDURE — 64795 BIOPSY OF NERVE: CPT | Performed by: STUDENT IN AN ORGANIZED HEALTH CARE EDUCATION/TRAINING PROGRAM

## 2025-02-14 PROCEDURE — 2720000007 HC OR 272 NO HCPCS: Performed by: STUDENT IN AN ORGANIZED HEALTH CARE EDUCATION/TRAINING PROGRAM

## 2025-02-14 PROCEDURE — 88319 ENZYME HISTOCHEMISTRY: CPT | Performed by: PATHOLOGY

## 2025-02-14 PROCEDURE — 3700000001 HC GENERAL ANESTHESIA TIME - INITIAL BASE CHARGE: Performed by: STUDENT IN AN ORGANIZED HEALTH CARE EDUCATION/TRAINING PROGRAM

## 2025-02-14 PROCEDURE — 7100000010 HC PHASE TWO TIME - EACH INCREMENTAL 1 MINUTE: Performed by: STUDENT IN AN ORGANIZED HEALTH CARE EDUCATION/TRAINING PROGRAM

## 2025-02-14 PROCEDURE — 2500000005 HC RX 250 GENERAL PHARMACY W/O HCPCS: Performed by: STUDENT IN AN ORGANIZED HEALTH CARE EDUCATION/TRAINING PROGRAM

## 2025-02-14 PROCEDURE — 7100000009 HC PHASE TWO TIME - INITIAL BASE CHARGE: Performed by: STUDENT IN AN ORGANIZED HEALTH CARE EDUCATION/TRAINING PROGRAM

## 2025-02-14 PROCEDURE — 88342 IMHCHEM/IMCYTCHM 1ST ANTB: CPT | Performed by: PATHOLOGY

## 2025-02-14 PROCEDURE — 88341 IMHCHEM/IMCYTCHM EA ADD ANTB: CPT | Performed by: PATHOLOGY

## 2025-02-14 PROCEDURE — 88305 TISSUE EXAM BY PATHOLOGIST: CPT | Performed by: PATHOLOGY

## 2025-02-14 PROCEDURE — 20205 DEEP MUSCLE BIOPSY: CPT | Performed by: STUDENT IN AN ORGANIZED HEALTH CARE EDUCATION/TRAINING PROGRAM

## 2025-02-14 PROCEDURE — 88348 ELECTRON MICROSCOPY DX: CPT | Performed by: PATHOLOGY

## 2025-02-14 PROCEDURE — 3600000007 HC OR TIME - EACH INCREMENTAL 1 MINUTE - PROCEDURE LEVEL TWO: Performed by: STUDENT IN AN ORGANIZED HEALTH CARE EDUCATION/TRAINING PROGRAM

## 2025-02-14 PROCEDURE — 88313 SPECIAL STAINS GROUP 2: CPT | Performed by: PATHOLOGY

## 2025-02-14 PROCEDURE — 3600000002 HC OR TIME - INITIAL BASE CHARGE - PROCEDURE LEVEL TWO: Performed by: STUDENT IN AN ORGANIZED HEALTH CARE EDUCATION/TRAINING PROGRAM

## 2025-02-14 PROCEDURE — 2500000004 HC RX 250 GENERAL PHARMACY W/ HCPCS (ALT 636 FOR OP/ED)

## 2025-02-14 PROCEDURE — 3700000002 HC GENERAL ANESTHESIA TIME - EACH INCREMENTAL 1 MINUTE: Performed by: STUDENT IN AN ORGANIZED HEALTH CARE EDUCATION/TRAINING PROGRAM

## 2025-02-14 RX ORDER — BUPIVACAINE HYDROCHLORIDE AND EPINEPHRINE 5; 5 MG/ML; UG/ML
INJECTION, SOLUTION EPIDURAL; INTRACAUDAL; PERINEURAL AS NEEDED
Status: DISCONTINUED | OUTPATIENT
Start: 2025-02-14 | End: 2025-02-14 | Stop reason: HOSPADM

## 2025-02-14 RX ORDER — LABETALOL HYDROCHLORIDE 5 MG/ML
5 INJECTION, SOLUTION INTRAVENOUS EVERY 10 MIN PRN
Status: CANCELLED | OUTPATIENT
Start: 2025-02-14

## 2025-02-14 RX ORDER — ACETAMINOPHEN 325 MG/1
975 TABLET ORAL ONCE
Status: DISCONTINUED | OUTPATIENT
Start: 2025-02-14 | End: 2025-02-14 | Stop reason: HOSPADM

## 2025-02-14 RX ORDER — SODIUM CHLORIDE, SODIUM LACTATE, POTASSIUM CHLORIDE, CALCIUM CHLORIDE 600; 310; 30; 20 MG/100ML; MG/100ML; MG/100ML; MG/100ML
5 INJECTION, SOLUTION INTRAVENOUS CONTINUOUS
Status: CANCELLED | OUTPATIENT
Start: 2025-02-14 | End: 2025-02-15

## 2025-02-14 RX ORDER — FENTANYL CITRATE 50 UG/ML
12.5 INJECTION, SOLUTION INTRAMUSCULAR; INTRAVENOUS EVERY 5 MIN PRN
Status: CANCELLED | OUTPATIENT
Start: 2025-02-14

## 2025-02-14 RX ORDER — OXYCODONE HYDROCHLORIDE 5 MG/1
10 TABLET ORAL EVERY 4 HOURS PRN
Status: CANCELLED | OUTPATIENT
Start: 2025-02-14

## 2025-02-14 RX ORDER — PROPOFOL 10 MG/ML
INJECTION, EMULSION INTRAVENOUS AS NEEDED
Status: DISCONTINUED | OUTPATIENT
Start: 2025-02-14 | End: 2025-02-14

## 2025-02-14 RX ORDER — ALBUTEROL SULFATE 0.83 MG/ML
2.5 SOLUTION RESPIRATORY (INHALATION) ONCE AS NEEDED
Status: CANCELLED | OUTPATIENT
Start: 2025-02-14

## 2025-02-14 RX ORDER — CEFAZOLIN 1 G/1
INJECTION, POWDER, FOR SOLUTION INTRAVENOUS AS NEEDED
Status: DISCONTINUED | OUTPATIENT
Start: 2025-02-14 | End: 2025-02-14

## 2025-02-14 RX ORDER — SODIUM CHLORIDE 0.9 G/100ML
INJECTION, SOLUTION IRRIGATION AS NEEDED
Status: DISCONTINUED | OUTPATIENT
Start: 2025-02-14 | End: 2025-02-14 | Stop reason: HOSPADM

## 2025-02-14 RX ORDER — HYDRALAZINE HYDROCHLORIDE 20 MG/ML
5 INJECTION INTRAMUSCULAR; INTRAVENOUS EVERY 10 MIN PRN
Status: CANCELLED | OUTPATIENT
Start: 2025-02-14

## 2025-02-14 RX ORDER — OXYCODONE AND ACETAMINOPHEN 5; 325 MG/1; MG/1
1 TABLET ORAL EVERY 4 HOURS PRN
Status: CANCELLED | OUTPATIENT
Start: 2025-02-14

## 2025-02-14 RX ORDER — LIDOCAINE HYDROCHLORIDE 10 MG/ML
0.1 INJECTION, SOLUTION INFILTRATION; PERINEURAL ONCE
Status: CANCELLED | OUTPATIENT
Start: 2025-02-14 | End: 2025-02-14

## 2025-02-14 RX ORDER — DROPERIDOL 2.5 MG/ML
0.62 INJECTION, SOLUTION INTRAMUSCULAR; INTRAVENOUS ONCE AS NEEDED
Status: CANCELLED | OUTPATIENT
Start: 2025-02-14

## 2025-02-14 RX ORDER — ACETAMINOPHEN 325 MG/1
650 TABLET ORAL EVERY 4 HOURS PRN
Status: CANCELLED | OUTPATIENT
Start: 2025-02-14

## 2025-02-14 RX ORDER — MIDAZOLAM HYDROCHLORIDE 1 MG/ML
INJECTION INTRAMUSCULAR; INTRAVENOUS AS NEEDED
Status: DISCONTINUED | OUTPATIENT
Start: 2025-02-14 | End: 2025-02-14

## 2025-02-14 RX ORDER — METOCLOPRAMIDE HYDROCHLORIDE 5 MG/ML
10 INJECTION INTRAMUSCULAR; INTRAVENOUS ONCE AS NEEDED
Status: CANCELLED | OUTPATIENT
Start: 2025-02-14

## 2025-02-14 RX ORDER — FENTANYL CITRATE 50 UG/ML
INJECTION, SOLUTION INTRAMUSCULAR; INTRAVENOUS AS NEEDED
Status: DISCONTINUED | OUTPATIENT
Start: 2025-02-14 | End: 2025-02-14

## 2025-02-14 RX ORDER — AMOXICILLIN 250 MG
1 CAPSULE ORAL DAILY
Qty: 7 TABLET | Refills: 0 | Status: SHIPPED | OUTPATIENT
Start: 2025-02-14

## 2025-02-14 RX ORDER — HYDROMORPHONE HYDROCHLORIDE 0.2 MG/ML
0.2 INJECTION INTRAMUSCULAR; INTRAVENOUS; SUBCUTANEOUS EVERY 5 MIN PRN
Status: CANCELLED | OUTPATIENT
Start: 2025-02-14

## 2025-02-14 RX ORDER — LIDOCAINE HYDROCHLORIDE 20 MG/ML
INJECTION, SOLUTION INFILTRATION; PERINEURAL AS NEEDED
Status: DISCONTINUED | OUTPATIENT
Start: 2025-02-14 | End: 2025-02-14

## 2025-02-14 RX ORDER — OXYCODONE HYDROCHLORIDE 5 MG/1
5 TABLET ORAL EVERY 6 HOURS PRN
Qty: 28 TABLET | Refills: 0 | Status: SHIPPED | OUTPATIENT
Start: 2025-02-14 | End: 2025-02-21 | Stop reason: ALTCHOICE

## 2025-02-14 RX ADMIN — MIDAZOLAM HYDROCHLORIDE 2 MG: 1 INJECTION, SOLUTION INTRAMUSCULAR; INTRAVENOUS at 17:10

## 2025-02-14 RX ADMIN — SODIUM CHLORIDE, SODIUM LACTATE, POTASSIUM CHLORIDE, AND CALCIUM CHLORIDE: 600; 310; 30; 20 INJECTION, SOLUTION INTRAVENOUS at 17:09

## 2025-02-14 RX ADMIN — LIDOCAINE HYDROCHLORIDE 60 MG: 20 INJECTION, SOLUTION INFILTRATION; PERINEURAL at 17:14

## 2025-02-14 RX ADMIN — CEFAZOLIN 2 G: 1 INJECTION, POWDER, FOR SOLUTION INTRAMUSCULAR; INTRAVENOUS at 17:15

## 2025-02-14 RX ADMIN — PROPOFOL 150 MCG/KG/MIN: 10 INJECTION, EMULSION INTRAVENOUS at 17:15

## 2025-02-14 RX ADMIN — FENTANYL CITRATE 50 MCG: 50 INJECTION, SOLUTION INTRAMUSCULAR; INTRAVENOUS at 17:14

## 2025-02-14 RX ADMIN — PROPOFOL 50 MG: 10 INJECTION, EMULSION INTRAVENOUS at 17:14

## 2025-02-14 RX ADMIN — FENTANYL CITRATE 25 MCG: 50 INJECTION, SOLUTION INTRAMUSCULAR; INTRAVENOUS at 17:32

## 2025-02-14 RX ADMIN — PROPOFOL 10 MG: 10 INJECTION, EMULSION INTRAVENOUS at 17:44

## 2025-02-14 ASSESSMENT — PAIN SCALES - GENERAL
PAINLEVEL_OUTOF10: 3
PAINLEVEL_OUTOF10: 2
PAINLEVEL_OUTOF10: 3
PAINLEVEL_OUTOF10: 7

## 2025-02-14 ASSESSMENT — COLUMBIA-SUICIDE SEVERITY RATING SCALE - C-SSRS
2. HAVE YOU ACTUALLY HAD ANY THOUGHTS OF KILLING YOURSELF?: NO
6. HAVE YOU EVER DONE ANYTHING, STARTED TO DO ANYTHING, OR PREPARED TO DO ANYTHING TO END YOUR LIFE?: NO
1. IN THE PAST MONTH, HAVE YOU WISHED YOU WERE DEAD OR WISHED YOU COULD GO TO SLEEP AND NOT WAKE UP?: NO

## 2025-02-14 ASSESSMENT — PAIN - FUNCTIONAL ASSESSMENT
PAIN_FUNCTIONAL_ASSESSMENT: 0-10

## 2025-02-14 NOTE — ANESTHESIA POSTPROCEDURE EVALUATION
Patient: Luis Condon Jr.    Procedure Summary       Date: 02/14/25 Room / Location: [facility] OR 05 / [facility]    Anesthesia Start: 1710 Anesthesia Stop: 1811    Procedure: LEFT Sural Nerve and Gastrocnemius Muscle Biopsy (Left) Diagnosis:       Other specified polyneuropathies      (Other specified polyneuropathies [G62.89])    Surgeons: Parker Francis MD PhD Responsible Provider: Fausto Mario MD    Anesthesia Type: MAC ASA Status: 3            Anesthesia Type: MAC    Vitals Value Taken Time   /92 02/14/25 1818   Temp 35.8 02/14/25 1818   Pulse 66 02/14/25 1818   Resp off 02/14/25 1818   SpO2 97 % 02/14/25 1818       Anesthesia Post Evaluation    Patient location during evaluation: PACU  Patient participation: complete - patient participated  Level of consciousness: awake and alert  Pain management: satisfactory to patient  Airway patency: patent  Cardiovascular status: blood pressure returned to baseline and acceptable  Respiratory status: acceptable  Hydration status: acceptable  Postoperative Nausea and Vomiting: none        There were no known notable events for this encounter.

## 2025-02-14 NOTE — DISCHARGE SUMMARY
Discharge Diagnosis  Other specified polyneuropathies    Issues Requiring Follow-Up  Incision follow up    Test Results Pending At Discharge  Pending Labs       Order Current Status    Surgical Pathology Exam Collected (02/14/25 4305)            Hospital Course   Patient presented with concern for undifferentiated polyneuropathy    2/14 s/p L gastroc muscle and sural nerve biopsy    Patient did well postoperatively and was discharged home    Pertinent Physical Exam At Time of Discharge  Incision c/d/I with glue in place    Home Medications     Medication List      START taking these medications     sennosides-docusate sodium 8.6-50 mg tablet; Commonly known as:   Mendy-Colace; Take 1 tablet by mouth once daily.     CHANGE how you take these medications     * oxyCODONE 10 mg immediate release tablet; Commonly known as:   Roxicodone; Take 1 tablet (10 mg) by mouth every 6 hours if needed for   severe pain (7 - 10).; What changed: Another medication with the same name   was added. Make sure you understand how and when to take each.   * oxyCODONE 5 mg immediate release tablet; Commonly known as:   Roxicodone; Take 1 tablet (5 mg) by mouth every 6 hours if needed for   severe pain (7 - 10) for up to 7 days.; What changed: You were already   taking a medication with the same name, and this prescription was added.   Make sure you understand how and when to take each.  * This list has 2 medication(s) that are the same as other medications   prescribed for you. Read the directions carefully, and ask your doctor or   other care provider to review them with you.     CONTINUE taking these medications     * albuterol 2.5 mg /3 mL (0.083 %) nebulizer solution   * albuterol 90 mcg/actuation inhaler; Commonly known as: ProAir HFA;   Inhale 2 puffs every 4 hours if needed for wheezing or shortness of   breath.   ALPRAZolam 2 mg tablet; Commonly known as: Xanax   azelastine 137 mcg (0.1 %) nasal spray; Commonly known as: Astelin;    Administer 2 sprays into each nostril 2 times a day. Use in each nostril   as directed   buPROPion 75 mg tablet; Commonly known as: Wellbutrin   cetirizine 10 mg tablet; Commonly known as: ZyrTEC   cloNIDine 0.1 mg tablet; Commonly known as: Catapres   cyclobenzaprine 10 mg tablet; Commonly known as: Flexeril; Take 1 tablet   (10 mg) by mouth 2 times a day.   EPINEPHrine 0.3 mg/0.3 mL injection syringe; Commonly known as: Epipen;   Inject 0.3 mL (0.3 mg) into the muscle 1 time if needed for anaphylaxis   for up to 1 dose. As directed   fluticasone 50 mcg/actuation nasal spray; Commonly known as: Flonase;   Administer 2 sprays into each nostril once daily. Shake gently. Before   first use, prime pump. After use, clean tip and replace cap.   * gabapentin 100 mg capsule; Commonly known as: Neurontin; TAKE 1   CAPSULE BY MOUTH THREE TIMES A DAY   * gabapentin 600 mg tablet; Commonly known as: Neurontin; TAKE 3 TABLETS   BY MOUTH 3 TIMES DAILY AS DIRECTED   hydrOXYzine pamoate 25 mg capsule; Commonly known as: Vistaril   icosapent ethyL 1 gram capsule; Commonly known as: Vascepa; Take 2   capsules (2 g) by mouth 2 times daily (morning and late afternoon).   ketoconazole 2 % shampoo; Commonly known as: NIZOral   lisinopril 20 mg tablet; Take 1 tablet (20 mg) by mouth once daily.   lubiprostone 24 mcg capsule; Commonly known as: Amitiza; Take 1 capsule   (24 mcg) by mouth 2 times a day. With food   meloxicam 15 mg tablet; Commonly known as: Mobic; TAKE 1 TABLET BY MOUTH   EVERY DAY AS NEEDED   methylPREDNISolone 4 mg tablets; Commonly known as: Medrol Dospak; TAKE   6 TABLETS ON DAY 1 AS DIRECTED ON PACKAGE AND DECREASE BY 1 TAB EACH DAY   FOR A TOTAL OF 6 DAYS   mirtazapine 30 mg tablet; Commonly known as: Remeron   multivitamin tablet   naloxone 4 mg/0.1 mL nasal spray; Commonly known as: Narcan   omeprazole 40 mg DR capsule; Commonly known as: PriLOSEC; Take 1 capsule   (40 mg) by mouth once daily in the morning. Take  before meals. Do not   crush or chew.   PARoxetine 20 mg tablet; Commonly known as: Paxil   propranolol 10 mg tablet; Commonly known as: Inderal   tazarotene 0.1 % cream; Commonly known as: Tazorac   TURMERIC ORAL   ZINC ORAL  * This list has 4 medication(s) that are the same as other medications   prescribed for you. Read the directions carefully, and ask your doctor or   other care provider to review them with you.       Outpatient Follow-Up  Future Appointments   Date Time Provider Department [place]   2/21/2025 10:00 AM Christina Ely MD [id number] [facility]   3/4/2025  8:50 AM Sherrie Rosas DO [id number] [facility]   3/4/2025  1:00 PM Parker Francis MD PhD [id number] [place]   3/6/2025  8:45 AM DO [id number] AI LEONEL HIGGINS MA [id number] [facility]   4/3/2025  8:45 AM DO [id number] AI LEONEL HIGGINS MA [id number] [facility]   5/1/2025  8:45 AM DO [id number] JOSEPH HIGGINS MA [id number] [facility]       Curtis Prado MD

## 2025-02-14 NOTE — OP NOTE
LEFT Sural Nerve and Gastrocnemius Muscle Biopsy (L) Operative Note     Date: 2025  OR Location: [facility] OR    Name: Lius Condon Jr., : 1980, Age: 44 y.o., MRN: [id number], Sex: male    Diagnosis  Pre-op Diagnosis      * Other specified polyneuropathies [G62.89] Post-op Diagnosis     * Other specified polyneuropathies [G62.89]     Procedures  Biopsy of the Sural Nerve - LEFT    - AL BIOPSY NERVE     Biopsy of Medial Gastrocnemius Muscle - LEFT   - AL BIOPSY MUSCLE DEEP     Surgeons      * Parker Francis - Primary    Resident/Fellow/Other Assistant:  Surgeons and Role:     * Curtis Prado MD - Resident - Assisting    Staff:   Circulator: Zahira  Scrub Person: Marcela Nevarez Scrub: Diony NOTES    Anesthesia Staff: Anesthesiologist: Fausto Mario MD  C-AA: JONELLE Mina  Anesthesia Resident: Maikel Khalil MD    Procedure Summary  Anesthesia: Monitor Anesthesia Care  ASA: III  Estimated Blood Loss: 5 mL  Intra-op Medications:   Administrations occurring from  to  on 25:   Medication Name Total Dose   BUPivacaine-EPINEPHrine (PF) (Marcaine w/EPI) 0.5 %-1:200,000 injection 15 mL   sodium chloride 0.9 % irrigation solution 1,000 mL   ceFAZolin (Ancef) 1 g 2 g   fentaNYL (Sublimaze) injection 50 mcg/mL 75 mcg   LR bolus Cannot be calculated   lidocaine (Xylocaine) 2 % 60 mg   midazolam PF (Versed) injection 1 mg/mL 2 mg   propofol (Diprivan) injection 10 mg/mL 1,168.08 mg              Anesthesia Record               Intraprocedure I/O Totals       None           Specimen:   ID Type Source Tests Collected by Time   1 : left sural nerve; saline soaked telfa Tissue MUSCLE BIOPSY SURGICAL PATHOLOGY EXAM Parker Francis MD PhD 2025 173   2 : Gastrocnemius Muscle Biopsy; saline soaked telfa Tissue MUSCLE BIOPSY SURGICAL PATHOLOGY EXAM Parker Francis MD PhD 2025          Findings:  Normal appearing muscle and nerve.  2 cm of nerve and muscle  biopsies sent as fresh specimen to pathology    Indications:     Luis Condon Jr. is a 44 y.o. year old male who presents with diffuse pain and paresthesia symptoms of unknown etiology with recent sensory loss in the left leg. He was referred by neurology for a LEFT Sural Nerve and Gastrocnemius muscle biopsy. He states the symptoms have progressed every year and has become very debilitating. He states that Dr. Yan discussed extensively with him the benefit if a biopsy.  The request seems appropriate in light of his progressive and debilitating symptoms.      I explained the risks of the procedure.  The most significant risk is that the biopsy does not guarantee a diagnosis, which is relatively common.  There is also the risk of infection and bleeding.  There will be numbness in the distribution of the sural nerve in the foot (found to be roughly 70% of normal sensation pre-op ).  There is also the risk of neuropathic pain in the sural nerve distribution.  The muscle biopsy has a risk of infection and bleeding. The patient would like to proceed with the biopsy.      Procedure Details:   The patient was brought to the operating room theater. A verbal huddle was performed confirming the patient by name, date of birth, medical record number, site of surgery. After all team members were in agreement, the patient was sedated under monitored anesthesia care.  Perioperative antibiotic administration was confirmed. Venous thrombosis prophylaxis have been ordered including unilateral sequential compression device on the contralateral extremity.     The patient was flipped onto a regular table with gel pads in the prone position. The entire LEFT leg was prepped under sterile conditions from the popliteal fossa and an extremity drape was used. A sterile clear bag was placed over the foot to further decrease the risk of contamination. Two separate incisions were designed for the nerve and muscle biopsy and each site was  infiltrated with local anesthetic and epinephrine.     A 3-cm vertical incision for the sural nerve was designed in the distal leg just proximal to the lateral malleolus in the groove between the fibula and Achilles tendon.  An incision was made with a 15-blade and further dissection through the subcutaneous tissue and superficial fascia was performed using blunt dissection, tenotomy scissors, right angle, and Bovie electrocautery. The sural nerve was clearly distinguished from the small saphenous vein and looped. The sural nerve was neurolysed to achieve a 2 cm length and transected with tenotomy scissors. The sample was sent to pathology as a fresh specimen wrapped in saline soaked telfa.    We then moved the medial calf and a vertical incision was made over the medial gastrocnemius muscle further dissection through the subcutaneous tissue was performed using blunt dissection, tenotomy scissors, right angle, and Bovie electrocautery. The deep muscle fascia and aponeurosis were opened with a 15 blade and tenotomy scissors.  A right angle was used to isolate about 2 cm of longitudinal muscle fibers with care not to damage the muscle. The muscle was resected with tenotomy scissors. The free muscle ends were then coagulated with bipolar electrocautery. The muscle sample was also sent as a fresh specimen wrapped in saline soaked telfa.     Electrocautery was used to establish hemostasis at both incisions and they were irrigated copiously with normal saline and closed with 3-0 for the dermis, followed by 4-0 monocryl and glue for the skin. Patient was turned into supine position on the stretcher and awakened without issues. All counts were correct at end of case.      Complications:  None; patient tolerated the procedure well.    Disposition: PACU - hemodynamically stable.  Condition: stable     Additional Details: none    Attending Attestation:     Parker Francis  Phone Number: [phone]

## 2025-02-14 NOTE — ANESTHESIA PREPROCEDURE EVALUATION
Patient: Luis Condon Jr.    Procedure Information       Date/Time: 02/14/25 4475    Procedure: LEFT Sural Nerve and Gastrocnemius Muscle Biopsy (Left) - LMA vs MAC vs GEN [ Per  ]    Location: [facility] OR  / [facility] OR    Surgeons: Parker Francis MD PhD           Patient with diffuse neuropathy of unknown etiology and was referred by neurology for a Sural Nerve and Gastrocnemius muscle biopsy.      Luis is a very pleasant gentlemen who has had diffuse symptoms likely due to an underlying autoimmune etiology, including Sjogrens, fibromyalgia, and history of GI issues.  He states the symptoms have progressed every year and has become very debilitating.    Relevant Problems   Anesthesia (within normal limits)      Cardiac  Normal ECHO 2024.  Mets >4   (+) Abnormal EKG   (+) Benign essential hypertension   (+) Essential hypertension   (+) Hyperlipidemia   (+) PVC's (premature ventricular contractions)      Pulmonary   (+) Intrinsic asthma without status asthmaticus (HHS-HCC)   (+) Pulmonary hypertension (Multi)      Neuro   (+) Carpal tunnel syndrome of right wrist   (+) Carpal tunnel syndrome, bilateral   (+) Generalized anxiety disorder   (+) Other specified polyneuropathies   (+) Severe major depression without psychotic features (Multi)   (+) Small fiber neuropathy   (+) Small fiber polyneuropathy      GI   (+) GERD (gastroesophageal reflux disease)      Endocrine   (+) Morbid (severe) obesity due to excess calories (Multi)      Hematology (within normal limits)      Musculoskeletal   (+) Carpal tunnel syndrome of right wrist   (+) Carpal tunnel syndrome, bilateral   (+) Chronic pain syndrome   (+) Localized chondromalacia      ID   (+) Oral thrush       Clinical information reviewed:    Allergies  Meds               NPO Detail:  NPO/Void Status  Carbohydrate Drink Given Prior to Surgery? : N  Date of Last Liquid: 02/14/25  Time of Last Liquid: 1000  Date of Last Solid:  02/13/25  Time of Last Solid: 2330  Last Intake Type: Clear fluids  Time of Last Void: 1200         Physical Exam    Airway  Mallampati: II     Cardiovascular - normal exam     Dental - normal exam     Pulmonary - normal exam     Abdominal      Other findings: Limited neck ROM, causing shooting pain in back and arms.        Anesthesia Plan    History of general anesthesia?: yes  History of complications of general anesthesia?: no    ASA 3     MAC     Anesthetic plan and risks discussed with patient and spouse.  Use of blood products discussed with patient and spouse who consented to blood products.    Plan discussed with attending.

## 2025-02-21 ENCOUNTER — OFFICE VISIT (OUTPATIENT)
Dept: RHEUMATOLOGY | Facility: CLINIC | Age: 45
End: 2025-02-21
Payer: MEDICARE

## 2025-02-21 VITALS
WEIGHT: 241.4 LBS | BODY MASS INDEX: 33.8 KG/M2 | DIASTOLIC BLOOD PRESSURE: 62 MMHG | HEART RATE: 81 BPM | OXYGEN SATURATION: 98 % | HEIGHT: 71 IN | SYSTOLIC BLOOD PRESSURE: 194 MMHG

## 2025-02-21 DIAGNOSIS — Z79.891 ENCOUNTER FOR LONG-TERM OPIATE ANALGESIC USE: ICD-10-CM

## 2025-02-21 DIAGNOSIS — R52 GENERALIZED PAIN: ICD-10-CM

## 2025-02-21 DIAGNOSIS — M79.2 NEUROPATHIC PAIN: ICD-10-CM

## 2025-02-21 DIAGNOSIS — M35.06 SJOGREN'S SYNDROME WITH PERIPHERAL NERVOUS SYSTEM INVOLVEMENT: Primary | ICD-10-CM

## 2025-02-21 PROCEDURE — 3078F DIAST BP <80 MM HG: CPT | Performed by: INTERNAL MEDICINE

## 2025-02-21 PROCEDURE — 99214 OFFICE O/P EST MOD 30 MIN: CPT | Performed by: INTERNAL MEDICINE

## 2025-02-21 PROCEDURE — G2211 COMPLEX E/M VISIT ADD ON: HCPCS | Performed by: INTERNAL MEDICINE

## 2025-02-21 PROCEDURE — 3008F BODY MASS INDEX DOCD: CPT | Performed by: INTERNAL MEDICINE

## 2025-02-21 PROCEDURE — 3077F SYST BP >= 140 MM HG: CPT | Performed by: INTERNAL MEDICINE

## 2025-02-21 RX ORDER — OXYCODONE HYDROCHLORIDE 10 MG/1
10 TABLET ORAL EVERY 6 HOURS PRN
Qty: 115 TABLET | Refills: 0 | Status: SHIPPED | OUTPATIENT
Start: 2025-02-28

## 2025-02-21 ASSESSMENT — ENCOUNTER SYMPTOMS
BACK PAIN: 1
JOINT SWELLING: 1
CONSTIPATION: 1
ARTHRALGIAS: 1
MYALGIAS: 1
NECK PAIN: 1
DIARRHEA: 1
FATIGUE: 1
NECK STIFFNESS: 1

## 2025-02-21 NOTE — PROGRESS NOTES
Subjective   Patient ID: Luis Condon Jr. is a 44 y.o. male who presents for Follow-up (3 mo fuv).  HPI  Patient with Sjogren's with positive STEPHANY and SSB with sicca symptoms, elevated CPK with normal muscle biopsy, episcleritis, possible small fiber neuropathy on skin biopsy.  Previous medications have included azathioprine, CellCept, methotrexate, Cytoxan, IVIG, Rituxan, high-dose steroids.    Patient was last seen in November and at that time He continued to have nerve issues and had an appointment for nerve biopsy.  He had nerve biopsy on 2/14/2025 of the left gastroc muscle and sural nerve.    Burns a lot of pain    Painte pain in his feeoot and that why he went frot eh biopsy  Still having issues with the ankel on the right.     More oft eh same  Noticing oo  Increasing becoming harder to focus on anyting   Hard to concentrate  He will miss a whole segment  Reading rereading and figiting.   Review of Systems   Constitutional:  Positive for fatigue.   Eyes:         Dry eye   Gastrointestinal:  Positive for constipation and diarrhea.   Musculoskeletal:  Positive for arthralgias, back pain, joint swelling, myalgias, neck pain and neck stiffness.   Psychiatric/Behavioral:          Depression       Objective   Physical Exam  GEN: NAD A&O x3 appropriate affect   EYES:  Wearing a brace on his right wrist   No current swelling in the hands elbows mild guarding with range of motion of the right shoulder has a brace on the right foot no swelling in the knees  No rashes seen on exposed skin     Assessment/Plan     Patient with +STEPHANY +SSB with sicca symptoms elevated cpks with normal muscle biopsy, episcleritis.   -Continues to work with neurology.  He has been found to have enlarged nerves and uncertain if this is hereditary/genetic or if it is due to autoimmune.  He has an appointment to meet with neurosurgeon for nerve biopsy.    -In the past we have done different immunosuppression, immunomodulation that had minimal  effect on his symptoms  -Reviewed his genetics and neurology appointments.      Sjogren's.- Has been on azathioprine, CellCept, methotrexate, Cytoxan, IVIG, Rituxan without any help in his symptoms. He had high dose steroids and had side effects to this       Has chronic pain.  Previously has seen pain management, Dr. Keagan Fan.  I have taken over his oxycodone prescription.  He is on combination of gabapentin, alprazolam, muscle relaxants and I am hesitant about increasing his medications further.  He also has had GI slowing which increasing narcotics could contribute more to.          In 3 months or as needed.     OARRS:  11/22/2024  I have personally reviewed the OARRS report for Luis Condon Jr.. I have considered the risks of abuse, dependence, addiction and diversion    Is the patient prescribed a combination of a benzodiazepine and opioid?  Yes, I feel it is clincially indicated to continue the medication and have discussed with the patient risks/benefits/alternatives.    Last Urine Drug Screen / ordered today: Yes  No results found for this or any previous visit (from the past 8760 hours).    Results are as expected.         Controlled Substance Agreement:  Date of the Last Agreement: 02/29/24  Reviewed Controlled Substance Agreement including but not limited to the benefits, risks, and alternatives to treatment with a Controlled Substance medication(s).    Opioids:  What is the patient's goal of therapy?help with pain  Is this being achieved with current treatment? some    I have calculated the patient's Morphine Dose Equivalent (MED):     I feel that it is clinically indicated to continue this current medication regimen after consideration of alternative therapies, and other non-opioid treatment.    Opioid Risk Screening:  No data recorded    Pain Assessment:  No data recorded  Christina Ely MD 02/21/25 10:16 AM

## 2025-02-25 DIAGNOSIS — B00.9 HERPES: Primary | ICD-10-CM

## 2025-02-25 DIAGNOSIS — I10 ESSENTIAL HYPERTENSION: ICD-10-CM

## 2025-02-25 DIAGNOSIS — R73.03 PREDIABETES: ICD-10-CM

## 2025-02-25 RX ORDER — LISINOPRIL 20 MG/1
20 TABLET ORAL DAILY
Qty: 90 TABLET | Refills: 1 | OUTPATIENT
Start: 2025-02-25

## 2025-02-27 LAB
LAB AP ASR DISCLAIMER: NORMAL
LABORATORY COMMENT REPORT: NORMAL
PATH REPORT.COMMENTS IMP SPEC: NORMAL
PATH REPORT.FINAL DX SPEC: NORMAL
PATH REPORT.GROSS SPEC: NORMAL
PATH REPORT.RELEVANT HX SPEC: NORMAL
PATH REPORT.TOTAL CANCER: NORMAL

## 2025-02-28 DIAGNOSIS — M79.2 NEUROPATHIC PAIN: Primary | ICD-10-CM

## 2025-03-01 DIAGNOSIS — E78.5 HYPERLIPIDEMIA, UNSPECIFIED HYPERLIPIDEMIA TYPE: ICD-10-CM

## 2025-03-02 ASSESSMENT — ENCOUNTER SYMPTOMS
FLATUS: 0
CONSTIPATION: 0
HEADACHES: 0
HEMATOCHEZIA: 0
ANOREXIA: 0
ABDOMINAL PAIN: 1
FREQUENCY: 0
DYSURIA: 0
HEMATURIA: 0
WEIGHT LOSS: 0
FEVER: 0
BELCHING: 0
DIARRHEA: 0
ARTHRALGIAS: 0
NAUSEA: 0
MYALGIAS: 0
VOMITING: 0

## 2025-03-03 ENCOUNTER — PROCEDURE VISIT (OUTPATIENT)
Dept: NEUROLOGY | Facility: HOSPITAL | Age: 45
End: 2025-03-03
Payer: MEDICARE

## 2025-03-03 DIAGNOSIS — M79.2 NEUROPATHIC PAIN: ICD-10-CM

## 2025-03-03 DIAGNOSIS — G57.80: Primary | ICD-10-CM

## 2025-03-03 PROCEDURE — 76882 US LMTD JT/FCL EVL NVASC XTR: CPT | Performed by: PSYCHIATRY & NEUROLOGY

## 2025-03-03 PROCEDURE — 76882 US LMTD JT/FCL EVL NVASC XTR: CPT | Mod: LT | Performed by: PSYCHIATRY & NEUROLOGY

## 2025-03-03 NOTE — PROGRESS NOTES
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  Now with neuropathic pain at biopsy site.  Please evaluate for sural neuropathy.    (a) to evaluate the echotexture and size of the [right] [sural nerve];   (b) to assess for any identifiable structural source the [right] [sural nerve] compression;   (c) to assess adjacent structures for abnormalities.    HEIGHT: 6 ft 0 in  WEIGHT: 235 lbs.      COMPARISON:   [None.]    TECHNIQUE:  Neuromuscular ultrasound of the [right] sural nerve was performed using a [AX[employer]] ultrasound machine with a [15-6 MHz matrix linear transducer.] [A limited study of the nerve proper was performed.] Cross sectional area (CSA) was measured within the epineurium, using the trace method. At locations where repeat measurements were taken, the mean value is reported below. Transverse and longitudinal images were obtained.     FINDINGS:  The sural nerve was well identified in the calf between the 2 heads of the gastrocnemius muscle next to the lesser saphenous vein.  The nerve showed a normal fascicular structure and was normal in approxi-4 mm².  Approaching the biopsy site, the nerve became hypoechoic and enlarged to 9.5 mm².  There was no nerve past that point.  There was slight increase Doppler signal consistent with mildly increased vascularity.  The distal part of the nerve was normal and was found several centimeters distal.  It measured 7 mm in cross-sectional area with a normal fascicular pattern.    IMPRESSION:  This is an abnormal neuromuscular ultrasound examination of the [right] sural nerve. At the proximal biopsy site, the nerve was hypoechoic and slightly enlarged.  This is consistent with either edema or potentially early neuroma formation.  There was slight increased vascularity at this point consistent with the fact that the surgery was only a couple weeks ago.  Of  note there was no structural lesion.  Specifically there was no hematoma, cyst or other structural abnormality..     The surrounding anatomic structures (osseous, ligamentous and tendinous) appeared normal.     Of note, probe pressure along the biopsy site did not elicit any increased symptoms.    Thus the findings above are consistent with the normal findings expected post sural nerve biopsy.      Performed by: Allen Montiel MD  Authorized by: Allen Montiel MD

## 2025-03-04 ENCOUNTER — OFFICE VISIT (OUTPATIENT)
Dept: NEUROSURGERY | Facility: CLINIC | Age: 45
End: 2025-03-04
Payer: MEDICARE

## 2025-03-04 ENCOUNTER — APPOINTMENT (OUTPATIENT)
Dept: PRIMARY CARE | Facility: CLINIC | Age: 45
End: 2025-03-04
Payer: MEDICARE

## 2025-03-04 VITALS
BODY MASS INDEX: 33.89 KG/M2 | SYSTOLIC BLOOD PRESSURE: 144 MMHG | OXYGEN SATURATION: 94 % | HEART RATE: 78 BPM | DIASTOLIC BLOOD PRESSURE: 98 MMHG | WEIGHT: 243 LBS | TEMPERATURE: 98.2 F

## 2025-03-04 DIAGNOSIS — I10 BENIGN ESSENTIAL HYPERTENSION: ICD-10-CM

## 2025-03-04 DIAGNOSIS — I10 ESSENTIAL HYPERTENSION: ICD-10-CM

## 2025-03-04 DIAGNOSIS — G62.89 OTHER POLYNEUROPATHY: Primary | ICD-10-CM

## 2025-03-04 DIAGNOSIS — R21 RASH: ICD-10-CM

## 2025-03-04 DIAGNOSIS — R73.03 PREDIABETES: ICD-10-CM

## 2025-03-04 DIAGNOSIS — R39.89 BLADDER PAIN: Primary | ICD-10-CM

## 2025-03-04 DIAGNOSIS — L03.818 CELLULITIS OF OTHER SPECIFIED SITE: ICD-10-CM

## 2025-03-04 DIAGNOSIS — J45.20 MILD INTERMITTENT INTRINSIC ASTHMA WITHOUT STATUS ASTHMATICUS WITHOUT COMPLICATION (HHS-HCC): ICD-10-CM

## 2025-03-04 LAB
POC APPEARANCE, URINE: CLEAR
POC BILIRUBIN, URINE: NEGATIVE
POC BLOOD, URINE: NEGATIVE
POC COLOR, URINE: YELLOW
POC GLUCOSE, URINE: NEGATIVE MG/DL
POC KETONES, URINE: NEGATIVE MG/DL
POC LEUKOCYTES, URINE: NEGATIVE
POC NITRITE,URINE: NEGATIVE
POC PH, URINE: 6 PH
POC PROTEIN, URINE: NEGATIVE MG/DL
POC SPECIFIC GRAVITY, URINE: 1.02
POC UROBILINOGEN, URINE: 0.2 EU/DL

## 2025-03-04 PROCEDURE — 3077F SYST BP >= 140 MM HG: CPT | Performed by: FAMILY MEDICINE

## 2025-03-04 PROCEDURE — 3080F DIAST BP >= 90 MM HG: CPT | Performed by: FAMILY MEDICINE

## 2025-03-04 PROCEDURE — 1036F TOBACCO NON-USER: CPT | Performed by: FAMILY MEDICINE

## 2025-03-04 PROCEDURE — 81003 URINALYSIS AUTO W/O SCOPE: CPT | Performed by: FAMILY MEDICINE

## 2025-03-04 PROCEDURE — 99024 POSTOP FOLLOW-UP VISIT: CPT | Performed by: STUDENT IN AN ORGANIZED HEALTH CARE EDUCATION/TRAINING PROGRAM

## 2025-03-04 PROCEDURE — 1036F TOBACCO NON-USER: CPT | Performed by: STUDENT IN AN ORGANIZED HEALTH CARE EDUCATION/TRAINING PROGRAM

## 2025-03-04 PROCEDURE — 99214 OFFICE O/P EST MOD 30 MIN: CPT | Performed by: FAMILY MEDICINE

## 2025-03-04 PROCEDURE — G2211 COMPLEX E/M VISIT ADD ON: HCPCS | Performed by: FAMILY MEDICINE

## 2025-03-04 RX ORDER — LISINOPRIL 30 MG/1
30 TABLET ORAL DAILY
Qty: 30 TABLET | Refills: 0 | Status: SHIPPED | OUTPATIENT
Start: 2025-03-04 | End: 2025-04-03

## 2025-03-04 RX ORDER — ALBUTEROL SULFATE 90 UG/1
2 INHALANT RESPIRATORY (INHALATION) EVERY 4 HOURS PRN
Qty: 8.5 G | Refills: 1 | Status: SHIPPED | OUTPATIENT
Start: 2025-03-04 | End: 2026-03-04

## 2025-03-04 RX ORDER — AMOXICILLIN AND CLAVULANATE POTASSIUM 875; 125 MG/1; MG/1
875 TABLET, FILM COATED ORAL 2 TIMES DAILY
Qty: 20 TABLET | Refills: 0 | Status: SHIPPED | OUTPATIENT
Start: 2025-03-04 | End: 2025-03-14

## 2025-03-04 ASSESSMENT — ENCOUNTER SYMPTOMS
CONSTIPATION: 0
HEMATOCHEZIA: 0
VOMITING: 0
MYALGIAS: 0
FEVER: 0
POLYPHAGIA: 0
FREQUENCY: 0
SLEEP DISTURBANCE: 0
ANOREXIA: 0
HEMATURIA: 0
DIZZINESS: 0
WEIGHT LOSS: 0
BELCHING: 0
HEADACHES: 0
FATIGUE: 1
DIFFICULTY URINATING: 0
HYPERTENSION: 1
FLATUS: 0
PALPITATIONS: 0
NAUSEA: 0
POLYDIPSIA: 0
BLOOD IN STOOL: 0
SHORTNESS OF BREATH: 0
ABDOMINAL PAIN: 1
DIARRHEA: 0
DYSURIA: 0

## 2025-03-04 NOTE — PROGRESS NOTES
[facility]  Department of Neurological Surgery  Post Operative Patient Visit      History of Present Illness:  Luis Condon Jr. is a 44 y.o. year old male who presents for a post operative visit following a biopsy of the LEFT sural nerve and medial gastrocnemius muscle on 2/14/25 at [facility].     Luis Condon Jr. is a 44 y.o. year old male who presents with diffuse pain and paresthesia symptoms of unknown etiology with recent sensory loss in the left leg. He was referred by neurology for a LEFT Sural Nerve and Gastrocnemius muscle biopsy. He states the symptoms have progressed every year and has become very debilitating. He states that Dr. Yan discussed extensively with him the benefit if a biopsy.  The request seems appropriate in light of his progressive and debilitating symptoms.     His pre-op sural nerve sensation was roughly 70% of normal.     PATHOLOGY FINAL DIAGNOSIS   A.  Peripheral nerve, left sural, biopsy:  - Peripheral nerve with mild axonal loss, thinly myelinated axons, and rare structures suggestive of axonal sprouts (see comment).  B.  Skeletal muscle, gastrocnemius, biopsy:  - Skeletal muscle, no significant histopathological abnormalities.    Today, Luis is having some neuropathic pain in the sural distribution.  It has already improved significantly for the first week.  This can be provoked by palpating the area proximal to the incision.  The area is numb as expected.  He had a neuromuscular ultrasound performed this week due to the symptoms and nothing was found, as I would expect. Neuropathic pain is a known early complication and most do improve over several weeks.  Unfortunately, the nerve biopsy was not helpful in diagnosis.  His incisions are healing well.    Luis will follow-up with me by phone in 4 weeks to let me know how the pain is doing.  If still significant, I may have him get a local steroid injection.    The above clinical summary has been  dictated with voice recognition software. It has not been proofread for grammatical errors, typographical mistakes, or other semantic inconsistencies.    Thank you for visiting our office today. It was our pleasure to take part in your healthcare.     Do not hesitate to call with any questions regarding your plan of care after leaving at [phone] M-F 8am-4pm.     To clinicians, thank you very much for this kind referral. It is a privilege to partner with you in the care of your patients. My office would be delighted to assist you with any further consultations or with questions regarding the plan of care outlined. Do not hesitate to call the office or contact me directly.       Sincerely,      Parker Francis MD, PhD  Attending Neurosurgeon, [facility]   of Neurological Surgery  [facility] [employer] [facility] [facility] [employer]  Office: [phone]  Fax: [phone]    [facility]  [address] [place] [address]  [address] [address]

## 2025-03-04 NOTE — PROGRESS NOTES
Subjective   Patient ID: uLis Condon Jr. is a 44 y.o. male who presents for Hyperlipidemia, Hypertension, Prediabetes (Review BW), and Abdominal Pain (Stomach pain, bloating x 1 week   NKI) and multiple issues.  Did not get blood work    Abdominal Pain  This is a new problem. The current episode started in the past 7 days. The onset quality is gradual. The problem occurs constantly. The most recent episode lasted 7 days. The problem has been gradually worsening. The pain is located in the suprapubic region. The pain is at a severity of 7/10. The quality of the pain is burning. The abdominal pain radiates to the pelvis and scrotum. Pertinent negatives include no anorexia, belching, constipation, diarrhea, dysuria, fever, flatus, frequency, headaches, hematochezia, hematuria, melena, myalgias, nausea, vomiting or weight loss. Nothing aggravates the pain. The pain is relieved by Nothing. Prior diagnostic workup includes lower endoscopy.      Hypertension: High today.  Has been running high at appointments  Prediabetes: Due for recheck  Asthma: Stable.  Using albuterol as needed  Bladder pain/cellulitis: Reports suprapubic tenderness palpation since nicking himself after shaving  With clippers.  Has nonhealing lesions on the shaft of his penis.  Positive enlarged lymph nodes.  Patient has some concern for possible STI.  Has not seen a wife but he is unsure on her end.  No penile discharge.  Denies fevers or chills.  He also feels bloating lower abdomen near pain.  No recent antibiotics    Review of Systems   Constitutional:  Positive for fatigue. Negative for fever and weight loss.   HENT: Negative.     Eyes:  Negative for visual disturbance.   Respiratory:  Negative for shortness of breath.    Cardiovascular:  Negative for chest pain and palpitations.   Gastrointestinal:  Positive for abdominal pain. Negative for anorexia, blood in stool, constipation, diarrhea, flatus, hematochezia, melena, nausea and vomiting.    Endocrine: Negative for polydipsia, polyphagia and polyuria.   Genitourinary:  Negative for difficulty urinating, dysuria, frequency and hematuria.   Musculoskeletal:  Negative for myalgias.   Skin:  Negative for rash.   Neurological:  Negative for dizziness and headaches.   Psychiatric/Behavioral:  Negative for sleep disturbance.        Objective   BP (!) 144/98   Pulse 78   Temp 36.8 °C (98.2 °F)   Wt 110 kg (243 lb)   SpO2 94%   BMI 33.89 kg/m²     Physical Exam  Vitals and nursing note reviewed.   Constitutional:       General: He is not in acute distress.     Appearance: Normal appearance. He is not toxic-appearing.   HENT:      Head: Normocephalic.   Eyes:      Pupils: Pupils are equal, round, and reactive to light.   Cardiovascular:      Rate and Rhythm: Normal rate and regular rhythm.      Heart sounds: No murmur heard.  Pulmonary:      Effort: Pulmonary effort is normal. No respiratory distress.      Breath sounds: Normal breath sounds.   Abdominal:      General: Bowel sounds are normal.      Palpations: Abdomen is soft.      Tenderness: There is abdominal tenderness (Suprapubic area). There is no guarding.   Genitourinary:     Comments: Because hyperpigmented scabs and small nonhealing ulcers right side of shaft.  Mild tender to palpation.  Musculoskeletal:         General: No tenderness.      Cervical back: Neck supple.      Right lower leg: No edema.      Left lower leg: No edema.   Lymphadenopathy:      Cervical: No cervical adenopathy.      Lower Body: Right inguinal adenopathy present. Left inguinal adenopathy present.   Skin:     General: Skin is warm.   Neurological:      General: No focal deficit present.      Mental Status: He is alert.      Cranial Nerves: No cranial nerve deficit.   Psychiatric:         Mood and Affect: Mood normal.         Assessment/Plan   Problem List Items Addressed This Visit             ICD-10-CM    Benign essential hypertension I10    Intrinsic asthma without  status asthmaticus ([facility]) J45.20    Relevant Medications    albuterol (ProAir HFA) 90 mcg/actuation inhaler    Essential hypertension I10    Relevant Medications    lisinopril 30 mg tablet    Prediabetes R73.03    Relevant Medications    lisinopril 30 mg tablet     Other Visit Diagnoses         Codes    Bladder pain    -  Primary R39.89    Relevant Orders    POCT UA Automated manually resulted (Completed)    C. trachomatis / N. gonorrhoeae, Amplified, Urogenital    Cellulitis of other specified site     L03.818    Relevant Medications    amoxicillin-pot clavulanate (Augmentin) 875-125 mg tablet    Other Relevant Orders    [facility] HSV, TYPE 1 AND 2 DNA, QN REAL TIME PCR    Rash     R21    Relevant Orders    [facility] HSV, TYPE 1 AND 2 DNA, QN REAL TIME PCR        Discussed side effects of meds.  Recommendations given

## 2025-03-04 NOTE — PATIENT INSTRUCTIONS
Start antibiotic    Increase lisinopril to 30mg daily    Keep track of your home blood pressures.  Log your blood pressure for 10 days. Goal is <130/80.  Notify the office if your blood pressure is consistently high for possible medication adjustment.     Obtain fasting blood work    Return in 2 weeks for recheck, sooner if needed

## 2025-03-05 LAB
C TRACH RRNA SPEC QL NAA+PROBE: NOT DETECTED
N GONORRHOEA RRNA SPEC QL NAA+PROBE: NOT DETECTED
QUEST GC CT AMPLIFIED (ALWAYS MESSAGE): NORMAL

## 2025-03-06 ENCOUNTER — APPOINTMENT (OUTPATIENT)
Dept: ALLERGY | Facility: CLINIC | Age: 45
End: 2025-03-06
Payer: MEDICARE

## 2025-03-06 DIAGNOSIS — J30.89 ALLERGIC RHINITIS DUE TO OTHER ALLERGIC TRIGGER, UNSPECIFIED SEASONALITY: ICD-10-CM

## 2025-03-06 PROCEDURE — 95117 IMMUNOTHERAPY INJECTIONS: CPT | Performed by: ALLERGY & IMMUNOLOGY

## 2025-03-06 RX ORDER — ICOSAPENT ETHYL 1000 MG/1
CAPSULE ORAL
Qty: 360 CAPSULE | Refills: 1 | OUTPATIENT
Start: 2025-03-06

## 2025-03-09 LAB
HSV1 DNA SPEC QL NAA+PROBE: NOT DETECTED
HSV2 DNA SPEC QL NAA+PROBE: DETECTED

## 2025-03-11 RX ORDER — VALACYCLOVIR HYDROCHLORIDE 1 G/1
1000 TABLET, FILM COATED ORAL 2 TIMES DAILY
Qty: 20 TABLET | Refills: 0 | Status: SHIPPED | OUTPATIENT
Start: 2025-03-11 | End: 2025-03-21

## 2025-03-11 NOTE — TELEPHONE ENCOUNTER
----- Message from Sherrie Rosas sent at 3/10/2025 11:32 PM EDT -----  Pls tell pt that he did test positive for genital herpes. If he still has open sores, options are to treat with medication. Let me know

## 2025-03-11 NOTE — TELEPHONE ENCOUNTER
Spoke to pt and informed of I[facility] msg, pt would like rx to go to [facility]

## 2025-03-12 DIAGNOSIS — G62.9 POLYNEUROPATHY, UNSPECIFIED: ICD-10-CM

## 2025-03-14 LAB
ALBUMIN SERPL-MCNC: 4.4 G/DL (ref 3.6–5.1)
ALP SERPL-CCNC: 60 U/L (ref 36–130)
ALT SERPL-CCNC: 32 U/L (ref 9–46)
ANION GAP SERPL CALCULATED.4IONS-SCNC: 8 MMOL/L (CALC) (ref 7–17)
AST SERPL-CCNC: 33 U/L (ref 10–40)
BILIRUB SERPL-MCNC: 0.4 MG/DL (ref 0.2–1.2)
BUN SERPL-MCNC: 10 MG/DL (ref 7–25)
CALCIUM SERPL-MCNC: 9.3 MG/DL (ref 8.6–10.3)
CHLORIDE SERPL-SCNC: 104 MMOL/L (ref 98–110)
CHOLEST SERPL-MCNC: 226 MG/DL
CHOLEST/HDLC SERPL: 7.8 (CALC)
CO2 SERPL-SCNC: 26 MMOL/L (ref 20–32)
CREAT SERPL-MCNC: 1.09 MG/DL (ref 0.6–1.29)
EGFRCR SERPLBLD CKD-EPI 2021: 87 ML/MIN/1.73M2
EST. AVERAGE GLUCOSE BLD GHB EST-MCNC: 120 MG/DL
EST. AVERAGE GLUCOSE BLD GHB EST-SCNC: 6.6 MMOL/L
GLUCOSE SERPL-MCNC: 114 MG/DL (ref 65–99)
HBA1C MFR BLD: 5.8 % OF TOTAL HGB
HDLC SERPL-MCNC: 29 MG/DL
LDLC SERPL CALC-MCNC: ABNORMAL MG/DL
NONHDLC SERPL-MCNC: 197 MG/DL (CALC)
POTASSIUM SERPL-SCNC: 4.4 MMOL/L (ref 3.5–5.3)
PROT SERPL-MCNC: 7.4 G/DL (ref 6.1–8.1)
SODIUM SERPL-SCNC: 138 MMOL/L (ref 135–146)
TRIGL SERPL-MCNC: 567 MG/DL

## 2025-03-18 ENCOUNTER — APPOINTMENT (OUTPATIENT)
Dept: PRIMARY CARE | Facility: CLINIC | Age: 45
End: 2025-03-18
Payer: MEDICARE

## 2025-03-18 VITALS
BODY MASS INDEX: 33.22 KG/M2 | OXYGEN SATURATION: 97 % | DIASTOLIC BLOOD PRESSURE: 84 MMHG | SYSTOLIC BLOOD PRESSURE: 120 MMHG | WEIGHT: 238.2 LBS | TEMPERATURE: 98.3 F | HEART RATE: 75 BPM

## 2025-03-18 DIAGNOSIS — E78.5 HYPERLIPIDEMIA, UNSPECIFIED HYPERLIPIDEMIA TYPE: Primary | ICD-10-CM

## 2025-03-18 DIAGNOSIS — K21.9 GASTROESOPHAGEAL REFLUX DISEASE WITHOUT ESOPHAGITIS: ICD-10-CM

## 2025-03-18 DIAGNOSIS — I10 ESSENTIAL HYPERTENSION: ICD-10-CM

## 2025-03-18 DIAGNOSIS — R73.03 PREDIABETES: ICD-10-CM

## 2025-03-18 DIAGNOSIS — A60.02 HERPES GENITALIS IN MEN: ICD-10-CM

## 2025-03-18 DIAGNOSIS — K59.09 CHRONIC CONSTIPATION: ICD-10-CM

## 2025-03-18 PROCEDURE — 3074F SYST BP LT 130 MM HG: CPT | Performed by: FAMILY MEDICINE

## 2025-03-18 PROCEDURE — G2211 COMPLEX E/M VISIT ADD ON: HCPCS | Performed by: FAMILY MEDICINE

## 2025-03-18 PROCEDURE — 99214 OFFICE O/P EST MOD 30 MIN: CPT | Performed by: FAMILY MEDICINE

## 2025-03-18 PROCEDURE — 3079F DIAST BP 80-89 MM HG: CPT | Performed by: FAMILY MEDICINE

## 2025-03-18 PROCEDURE — 1036F TOBACCO NON-USER: CPT | Performed by: FAMILY MEDICINE

## 2025-03-18 RX ORDER — OMEPRAZOLE 40 MG/1
40 CAPSULE, DELAYED RELEASE ORAL
Qty: 90 CAPSULE | Refills: 1 | Status: SHIPPED | OUTPATIENT
Start: 2025-03-18 | End: 2025-09-14

## 2025-03-18 RX ORDER — LUBIPROSTONE 24 UG/1
24 CAPSULE ORAL 2 TIMES DAILY
Qty: 180 CAPSULE | Refills: 1 | Status: SHIPPED | OUTPATIENT
Start: 2025-03-18 | End: 2025-09-14

## 2025-03-18 RX ORDER — FENOFIBRATE 145 MG/1
145 TABLET, FILM COATED ORAL DAILY
Qty: 90 TABLET | Refills: 0 | Status: SHIPPED | OUTPATIENT
Start: 2025-03-18 | End: 2025-06-16

## 2025-03-18 RX ORDER — LISINOPRIL 30 MG/1
30 TABLET ORAL DAILY
Qty: 90 TABLET | Refills: 0 | Status: SHIPPED | OUTPATIENT
Start: 2025-03-18 | End: 2025-06-16

## 2025-03-18 ASSESSMENT — ENCOUNTER SYMPTOMS
ORTHOPNEA: 1
DYSURIA: 0
BLOOD IN STOOL: 0
SHORTNESS OF BREATH: 1
FATIGUE: 0
POLYPHAGIA: 0
SLEEP DISTURBANCE: 0
BLURRED VISION: 0
DIFFICULTY URINATING: 0
PND: 0
HYPERTENSION: 1
DIARRHEA: 0
VOMITING: 0
HEADACHES: 0
NAUSEA: 0
NECK PAIN: 0
DIZZINESS: 0
SWEATS: 1
SHORTNESS OF BREATH: 0
PALPITATIONS: 0
CONSTIPATION: 1
POLYDIPSIA: 0
ABDOMINAL PAIN: 0
DYSPHORIC MOOD: 0

## 2025-03-18 NOTE — PATIENT INSTRUCTIONS
Recommend a predominant low fat whole foods plant based diet.  Cut back on meat, dairy, processed carbs, salt and oils(especially palm and coconut). Increase fiber in your diet.  Decrease alcohol as much as possible if you drink. Recommend regular exercise most days of the week(goal up to 150min per week). Also recommend good sleep habits aiming for 7-8 hours per night.     Add fenofibrate    Continue your other meds    Return in 3 months for recheck, sooner if needed

## 2025-03-18 NOTE — PROGRESS NOTES
Subjective   Patient ID: Luis Condon Jr. is a 44 y.o. male who presents for Hypertension (Recheck, review bw ), Hyperlipidemia, and Prediabetes and multiple issues    Hypertension  This is a chronic problem. The current episode started more than 1 year ago. The problem is unchanged. The problem is controlled. Associated symptoms include anxiety, malaise/fatigue, orthopnea and sweats. Pertinent negatives include no blurred vision, chest pain, headaches, neck pain, palpitations, peripheral edema, PND or shortness of breath. There are no associated agents to hypertension. Risk factors for coronary artery disease include dyslipidemia, obesity, sedentary lifestyle and stress. Compliance problems include exercise.       HTN: improved on higher dose ACE  Lipids: high. HDL 29, LDL could not be calc, . Trying to work on diet.   Predm: stable. A1c 5.8%  HSV: new onset on skin cx. Pt noticed improvement after abx so never started valtrex. Does not want to be on suppressive tx. Did notify wife who does not have symptoms. Lower abd skin discomfort resolved  Constipation: stable.   GERD: stable.     Review of Systems   Constitutional:  Positive for malaise/fatigue. Negative for fatigue.   Eyes:  Negative for blurred vision and visual disturbance.   Respiratory:  Negative for shortness of breath.    Cardiovascular:  Positive for orthopnea. Negative for chest pain, palpitations and PND.   Gastrointestinal:  Positive for constipation. Negative for abdominal pain, blood in stool, diarrhea, nausea and vomiting.   Endocrine: Negative for polydipsia, polyphagia and polyuria.   Genitourinary:  Negative for difficulty urinating and dysuria.   Musculoskeletal:  Negative for neck pain.   Skin:  Negative for rash.   Neurological:  Negative for dizziness and headaches.   Psychiatric/Behavioral:  Negative for dysphoric mood and sleep disturbance.        Objective   /84   Pulse 75   Temp 36.8 °C (98.3 °F)   Wt 108 kg (238 lb  3.2 oz)   SpO2 97%   BMI 33.22 kg/m²     Physical Exam  Vitals and nursing note reviewed.   Constitutional:       General: He is not in acute distress.     Appearance: Normal appearance. He is not toxic-appearing.   HENT:      Head: Normocephalic.      Mouth/Throat:      Pharynx: Oropharynx is clear.   Eyes:      General: No scleral icterus.     Pupils: Pupils are equal, round, and reactive to light.   Neck:      Vascular: No carotid bruit.   Cardiovascular:      Rate and Rhythm: Normal rate and regular rhythm.      Heart sounds: No murmur heard.  Pulmonary:      Effort: Pulmonary effort is normal. No respiratory distress.      Breath sounds: Normal breath sounds.   Abdominal:      Palpations: Abdomen is soft.      Tenderness: There is no abdominal tenderness. There is no guarding.   Musculoskeletal:         General: No tenderness.      Cervical back: Neck supple.      Right lower leg: No edema.      Left lower leg: No edema.   Lymphadenopathy:      Cervical: No cervical adenopathy.   Skin:     General: Skin is warm.   Neurological:      General: No focal deficit present.      Mental Status: He is alert.      Cranial Nerves: No cranial nerve deficit.   Psychiatric:         Mood and Affect: Mood normal.         Assessment/Plan   Problem List Items Addressed This Visit             ICD-10-CM    Chronic constipation K59.09    Relevant Medications    lubiprostone (Amitiza) 24 mcg capsule    Other Relevant Orders    Follow Up In Primary Care    Hyperlipidemia - Primary E78.5    Relevant Medications    fenofibrate (Tricor) 145 mg tablet    Other Relevant Orders    Comprehensive Metabolic Panel    Lipid Panel    Follow Up In Primary Care    Essential hypertension I10    Relevant Medications    lisinopril 30 mg tablet    Other Relevant Orders    Comprehensive Metabolic Panel    Follow Up In Primary Care    Prediabetes R73.03    Relevant Medications    lisinopril 30 mg tablet    Other Relevant Orders    Hemoglobin A1C     Comprehensive Metabolic Panel    Follow Up In Primary Care    GERD (gastroesophageal reflux disease) K21.9    Relevant Medications    omeprazole (PriLOSEC) 40 mg DR capsule    Other Relevant Orders    Follow Up In Primary Care    Herpes genitalis in men A60.02     Discussed bw and tx option for HSV. Declines daily tx. Also discussed starting tx asap of another oubtreak. Pt verbalized understanding. Discussed side effect of new med. Recommendations given

## 2025-03-21 RX ORDER — GABAPENTIN 100 MG/1
100 CAPSULE ORAL 3 TIMES DAILY
Qty: 270 CAPSULE | Refills: 3 | Status: SHIPPED | OUTPATIENT
Start: 2025-03-21

## 2025-03-25 DIAGNOSIS — R52 PAIN, UNSPECIFIED: ICD-10-CM

## 2025-03-26 RX ORDER — MELOXICAM 15 MG/1
15 TABLET ORAL DAILY PRN
Qty: 30 TABLET | Refills: 5 | Status: SHIPPED | OUTPATIENT
Start: 2025-03-26

## 2025-03-28 DIAGNOSIS — M35.06 SJOGREN'S SYNDROME WITH PERIPHERAL NERVOUS SYSTEM INVOLVEMENT: ICD-10-CM

## 2025-03-28 NOTE — TELEPHONE ENCOUNTER
Prescription Request for Controlled Substance         Has The Patient Been Identified By Name And Date Of Birth: Yes    RX Requestor: Patient    Date of Last Refill: 02/28/2025    Date of Last Controlled Substance Agreement Signed: 02/21/2025    Date of Last Drug Screen: 02/21/2025    Date Of Last Office Visit: 02/21/2025    Date Of Future Office Visit: 05/22/2025

## 2025-03-29 RX ORDER — OXYCODONE HYDROCHLORIDE 10 MG/1
10 TABLET ORAL EVERY 6 HOURS PRN
Qty: 115 TABLET | Refills: 0 | Status: SHIPPED | OUTPATIENT
Start: 2025-03-29

## 2025-04-03 ENCOUNTER — APPOINTMENT (OUTPATIENT)
Dept: ALLERGY | Facility: CLINIC | Age: 45
End: 2025-04-03
Payer: MEDICARE

## 2025-04-03 DIAGNOSIS — J30.89 ALLERGIC RHINITIS DUE TO OTHER ALLERGIC TRIGGER, UNSPECIFIED SEASONALITY: ICD-10-CM

## 2025-04-03 PROCEDURE — 95117 IMMUNOTHERAPY INJECTIONS: CPT | Performed by: ALLERGY & IMMUNOLOGY

## 2025-04-04 DIAGNOSIS — G62.9 SMALL FIBER NEUROPATHY: ICD-10-CM

## 2025-04-04 DIAGNOSIS — M35.01 SJOGREN'S SYNDROME WITH KERATOCONJUNCTIVITIS SICCA: Primary | ICD-10-CM

## 2025-04-04 RX ORDER — DIPHENHYDRAMINE HCL 25 MG
25 TABLET ORAL ONCE
OUTPATIENT
Start: 2025-04-28

## 2025-04-04 RX ORDER — ALBUTEROL SULFATE 0.83 MG/ML
3 SOLUTION RESPIRATORY (INHALATION) AS NEEDED
OUTPATIENT
Start: 2025-04-28

## 2025-04-04 RX ORDER — DIPHENHYDRAMINE HCL 25 MG
25 TABLET ORAL ONCE
OUTPATIENT
Start: 2025-04-04

## 2025-04-04 RX ORDER — ACETAMINOPHEN 325 MG/1
650 TABLET ORAL ONCE
OUTPATIENT
Start: 2025-04-04

## 2025-04-04 RX ORDER — DIPHENHYDRAMINE HCL 25 MG
25 TABLET ORAL ONCE
Status: CANCELLED | OUTPATIENT
Start: 2025-04-28

## 2025-04-04 RX ORDER — DIPHENHYDRAMINE HYDROCHLORIDE 50 MG/ML
50 INJECTION, SOLUTION INTRAMUSCULAR; INTRAVENOUS AS NEEDED
OUTPATIENT
Start: 2025-04-04

## 2025-04-04 RX ORDER — DIPHENHYDRAMINE HYDROCHLORIDE 50 MG/ML
50 INJECTION, SOLUTION INTRAMUSCULAR; INTRAVENOUS AS NEEDED
OUTPATIENT
Start: 2025-04-28

## 2025-04-04 RX ORDER — FAMOTIDINE 10 MG/ML
20 INJECTION, SOLUTION INTRAVENOUS ONCE AS NEEDED
OUTPATIENT
Start: 2025-04-28

## 2025-04-04 RX ORDER — ACETAMINOPHEN 325 MG/1
650 TABLET ORAL ONCE
Status: CANCELLED | OUTPATIENT
Start: 2025-04-28

## 2025-04-04 RX ORDER — ACETAMINOPHEN 325 MG/1
650 TABLET ORAL ONCE
OUTPATIENT
Start: 2025-04-28

## 2025-04-04 RX ORDER — FAMOTIDINE 10 MG/ML
20 INJECTION, SOLUTION INTRAVENOUS ONCE AS NEEDED
OUTPATIENT
Start: 2025-04-04

## 2025-04-04 RX ORDER — EPINEPHRINE 0.3 MG/.3ML
0.3 INJECTION SUBCUTANEOUS EVERY 5 MIN PRN
OUTPATIENT
Start: 2025-04-28

## 2025-04-04 RX ORDER — ALBUTEROL SULFATE 0.83 MG/ML
3 SOLUTION RESPIRATORY (INHALATION) AS NEEDED
OUTPATIENT
Start: 2025-04-04

## 2025-04-04 RX ORDER — EPINEPHRINE 0.3 MG/.3ML
0.3 INJECTION SUBCUTANEOUS EVERY 5 MIN PRN
OUTPATIENT
Start: 2025-04-04

## 2025-04-04 NOTE — PROGRESS NOTES
IVIG at [facility] center:   Dx: Sjogrens and small fiber neuropathy. His adjusted body weight is 90 kg, but we are going to use ideal of 75 kg so that his maintenance dose of 75 gm can be given in 1 day as opposed to 2, every 3 weeks. Loading dose of 150 gm to be done over 4 days.   Eliceo Worthy, PharmD

## 2025-04-25 DIAGNOSIS — M35.06 SJOGREN'S SYNDROME WITH PERIPHERAL NERVOUS SYSTEM INVOLVEMENT: ICD-10-CM

## 2025-04-25 RX ORDER — OXYCODONE HYDROCHLORIDE 10 MG/1
10 TABLET ORAL EVERY 6 HOURS PRN
Qty: 115 TABLET | Refills: 0 | Status: SHIPPED | OUTPATIENT
Start: 2025-04-26

## 2025-04-30 ENCOUNTER — DOCUMENTATION (OUTPATIENT)
Dept: NEUROLOGY | Facility: HOSPITAL | Age: 45
End: 2025-04-30
Payer: MEDICARE

## 2025-04-30 NOTE — PROGRESS NOTES
[facility]  Fax # [phone]  EXPEDITED APPEAL for IVIG for patient    Re: Mr. Luis Condon   1980  Member ID: [id number]  Service Reference # [id number]    To Whom It May Concern:    Documentation of Events and Interim history since 10/29/24     Mr. Condon is a 43 yo RH AAM last seen for clinical visit 10/29/24. Generalized pain, paresthesias in setting of Sjogren's (reportedly positive STEPHANY and SSB; negative anti-DS DNA, citrulline antibody, RF - with sicca symptoms, fibromyalgia, irritable bowel syndrome). Patient last seen in follow up of his generalized pain and paresthesias, and neuropathy, on 10/29/2024.  Since that last visit, there have been several new developments regarding his peripheral neuropathy.        Neuropathy - Currently on Gabapentin 1900 mg tid after increasing from 1800 mg tid at last visit (had previously taken as much as 2100 mg tid, although thinks he may have been calculating the amount incorrectly at that time). Also takes alprazolam, Meloxicam, Oxycodone, Turmeric 500 mg bid for cramps, and at various times short courses of prednisone for symptomatic relief of pain and cramps. Advil prn. Narcan at home. Per rheumatology, had restarted Flexeril for pain and cramps, and stopped baclofen and tizanidine. Does not think the increased Gabapentin dose, nor the recent addition of Flexeril, has helped the pain and extreme skin hypersensitivity. Describes excrutiating surface level pain in his calves and shins, especially if he's on his feet a lot. Feet feel tight and swollen, and red.       Treatment History: Pulse Solu-Medrol 1000 mg daily 3 days , helped pain. Cymbalta, Lyrica, Topamax, Methotrexate - without pain relief. Imuran and CellCept ineffective. Unable to tolerate Cytoxan - nausea, fatigue. IVIG in past - 1 dose every 3 weeks - back in 2018 was helpful, but rheumatology discontinued and patient was then given 2 infusions Rituxan , which did  "not help symptoms. Pain flared after stopping IVIG.  Rheumatology did not choose to restart the IVIG, which had been helpful in the past. Restarted Methotrexate - injectable form, which he had not had in past, but not effective. Thus, has failed Imuran, CellCept, methotrexate, Cytoxan.  Documentation reveals IVIG helped in 2017, 2018), but was discontinued, and given, Rituxan, Cymbalta, topamax. High dose steroids caused side effects. Followed by rheumatology.       - Dry eyes lately     - Peripheral Neuropathy    Patient underwent several blood tests to look for possible causes of his pain and sensory symptoms. HMGCoA antibodies normal, which were checked to see if some of his muscle problems/pain could be due to having been on lipitor in the past. Gene testing for the TTR gene (hereditary amyloid) normal. Kappa/lambda ratio, SPEP/CR, and 24 hour urine protein/CR - very mild abnormalities, but to my reading there was nothing worrisome there, the mild changes could be due to his presumed autoimmune disease.     In addition to patient's longstanding neuropathic symptoms, with generalized pain and paresthesias- he developed pain and tingling on lateral side left foot, few months ago, especially if he everts foot sideways or dorsiflexes foot - feels a spike like pins and needles sensation down the lateral side of his foot. When he touches his foot, it feels normal. Saw his PCP for this. Had Tinel's tapping on posterior lateral malleolus - shooting pain down the lateral side of the foot. She suggested possible tarsal tunnel syndrome, and suggested orthopedic referral.      Patient underwent Neuromuscular Ultrasound 7/25/24.  Dr. Allen Montiel:  \"IMPRESSION: Severely abnormal and complex Neuromuscular ultrasound examination of the nerves in the right and left upper extremities as well as a limited study of the nerves in the left lower extremity and a bilateral limited study of the neck (brachial plexus).    \"There was " "ultrasound evidence of a multifocal hypertrophic neuropathy with preferential involvement of entrapment sites. All the underlying musculature appeared normal as did all the bony, vascular and ligamentous structures. However there was marked nerve hypertrophy of both ulnar nerves at the retrocondylar groove. There was mild nerve hypertrophy of both median nerves at the carpal tunnel. However on the left the median nerve was also enlarged in the forearm segment. On the left there was definite enlargement and fascicular abnormalities of the median nerve in the upper arm and near the axilla.     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  Other causes of multifocal hypertrophic neuropathy cannot be excluded by this study.  However the lack of uniform nerve hypertrophy strongly argue against an inherited demyelinating neuropathy such as Charcot-Anabel-Tooth.\"     Patient then underwent extensive genetic testing for inherited forms of hypertrophic/demyelinating peripheral neuropathy, which were all negative/uninformative.      A left sural nerve and gastroc muscle biopsy consistent with neuropathy.      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     My request to treat patient with IVIG therapy has been denied by [facility]. He has responded well to IVIG therapy in the past, with reduction in symptoms. His most " recent neuromuscular ultrasound, as noted above, is consistent with a generalized multifocal hypertrophic neuropathy, with no genetic cause found, and which is consistent with a diagnosis of acquired chronic inflammatory demyelinating polyneuropathy, especially in the setting of other autoimmune disorders (patient has Sjogren's Syndrome).    I am therefore submitting this expedited appeal to request IVIG therapy for Mr. Luis Condon, for his chronic inflammatory demyelinating polyneuropathy. Please let me know if you need any further information.     Sincerely,    Polina Yan M.D.,Ph.D.

## 2025-04-30 NOTE — PROGRESS NOTES
Documentation of Events and Interim history since 10/29/24     Patient last seen in follow up of his generalized pain and paresthesias, and neuropathy, on 10/29/2024.  Since that last visit, there have been several new developments.     45 yo RH AAM last seen for clinical visit 10/29/24. Generalized pian, paresthesias in setting of Sjogren's (reportedly positive STEPHANY and SSB; negative anti-DS DNA, citrulline antibody, RF - with sicca symptoms, fibromyalgia, irritable bowel syndrome).     MUSCLE BIOPSY - R quadriceps - 1/20/12 - SKELETAL MUSCLE DEMONSTRATING PROMINENT ENDOMYSIAL AND PERIMYSIAL FIBROUS TISSUE. NO INFLAMMATION, VASCULITIS, MUSCLE FIBER DEGENERATION. MAY INDICATE NON-INFLAMMATORY MUSCLE INVOLVEMENT IN COLLAGEN VASCULAR DISEASES. Punch skin biopsy 2014 essentially normal, with some mintor findings that may suggest small fiber neuropathy,    -Clinical presentation consistent with neuropathy, with superimposed bilateral CTS.    Neuropathy - Currently on Gabapentin 1900 mg tid after increasing from 1800 mg tid at last visit (had previously taken as much as 2100 mg tid, although thinks he may have been calculating the amount incorrectly at that time). Also takes alprazolam, Meloxicam, Oxycodone, Turmeric 500 mg bid for cramps, and at various times short courses of prednisone for symptomatic relief of pain and cramps. Advil prn. Narcan at home. Per rheumatology, had restarted Flexeril for pain and cramps, and stopped baclofen and tizanidine. Does not think the increased Gabapentin dose, nor the recent addition of Flexeril, has helped the pain and extreme skin hypersensitivity. Describes excrutiating surface level pain in his calves and shins, especially if he's on his feet a lot. Feet feel tight and swollen, and red.      Treatment History: Pulse Solu-Medrol 1000 mg daily 3 days March, 2017, helped pain. Cymbalta, Lyrica, Topamax, Methotrexate - without pain relief. Imuran and CellCept ineffective. Unable to  tolerate Cytoxan - nausea, fatigue. IVIG in past - 1 dose every 3 weeks - back in 2017, 2018 was helpful, but rheumatology discontinued and patient was then given 2 infusions Rituxan August, 2018, which did not help symptoms. Pain flared after stopping IVIG. Restarted Methotrexate - injectable form, which he had not had in past, but not effective. Thus, has failed Imuran, CellCept, methotrexate, Cytoxan.  Documentation reveals IVIG helped in 2017, 2018), but was discontinues, and given, Rituxan, Cymbalta, topamax. High dose steroids caused side effects. Followed by rheumatology.      Pain management in past - Dr. Fan and then Dr. Santana.  Sees psychiatrist, Dr. Eliazar Doty - outside of . Was planning to start him on Buspar, but was on back order.    ROS:  - 15 - 20 pound weight gain in the past months. Sent to dietician - advised to change from plant based diet to more protein, but continues to gain weight. Legs are swollen.   - Very tired all the time, wakes up feeling tired and short of breath. Also short of breath walking upstairs. Has been ascribed to his weight gain. Had mild MARY JO 5 years ago, repeat sleep study ordered. Has been referred to Cardiology.    - Pelvic floor muscles very tight - abnormal pelvic floor movement by testing. Has been doing pelvic floor rehabilitation.   - Constipation and bloating alternating with diarrhea - IBS - has tried various remedies. Followed by GI. Recent unremarkable colonoscopy and EGD. Brother has Chrohn's Disease.   - Dry eyes lately    - Peripheral Neuropathy  In addition to patient's longstanding neuropathic symptoms, with generalized pain and paresthesias- he developed pain and tingling on lateral side left foot, a few months ago, especially if he everts foot sideways or dorsiflexes foot - feels a spike like pins and needles sensation down the lateral side of his foot. When he touches his foot, it feels normal. Saw his PCP for this. Had Tinel's tapping on  "posterior lateral malleolus - shooting pain down the lateral side of the foot. She suggested possible tarsal tunnel syndrome, and suggested orthopedic referral.     Patient has undergone several blood tests to look for other possible causes of his pain and sensory symptoms. HMGCoA antibodies normal, which were checked to see if some of his muscle problems/pain could be due to having been on lipitor in the past. Gene testing for the TTR gene (hereditary amyloid) normal. Kappa/lambda ratio, SPEP/CR, and 24 hour urine protein/CR - very mild abnormalities, but to my reading there was nothing worrisome there, the mild changes could be due to his presumed autoimmune disease.     Patient underwent Neuromuscular Ultrasound 7/25/24.  Dr. Allen Montiel:  \"IMPRESSION: Severely abnormal and complex Neuromuscular ultrasound examination of the nerves in the right and left upper extremities as well as a limited study of the nerves in the left lower extremity and a bilateral limited study of the neck (brachial plexus).    \"There was ultrasound evidence of a multifocal hypertrophic neuropathy with preferential involvement of entrapment sites. All the underlying musculature appeared normal as did all the bony, vascular and ligamentous structures. However there was marked nerve hypertrophy of both ulnar nerves at the retrocondylar groove. There was mild nerve hypertrophy of both median nerves at the carpal tunnel. However on the left the median nerve was also enlarged in the forearm segment. On the left there was definite enlargement and fascicular abnormalities of the median nerve in the upper arm and near the axilla.    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     Putting all of these findings together along with the patient's history of having neuropathic symptoms more than 10 years should prompt the possible diagnosis of hereditary neuropathy with liability to " "pressure palsy (HNPP).  Other causes of multifocal hypertrophic neuropathy cannot be excluded by this study.  However the lack of uniform nerve hypertrophy which strongly argue against an inherited demyelinating neuropathy such as Charcot-Anabel-Tooth.\"    Patient then underwent extensive genetic testing for inherited forms of demyelinating peripheral neuropathy, which were all negative/uninformative.     A left sural nerve and gastroc muscle biopsy consistent with neuropathy.     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     - bilateral CTS - bilateral wrist splints. Steroid injection R wrist March, 2017 helped for a while, but both hands felt worse at last visit. Also, had steroid injection R hand by rheumatology in the past - was having sharp pains at base of R index finger, for which steroid injection did help.     Saw Dr. Carl Marx, hand surgery, 9/19/23 Per Dr. Marx, \"Review of EMG study right upper extremity dated May 18, 2015 reveals evidence of mild right carpal tunnel syndrome. Impression: Bilateral carpal tunnel syndrome. Plan: He has never had any formal treatment or injections for the left side and he did well with injection on the right side in the past. He has not been offered repeat injections. He understands that surgery is likely his best option for definitive improvement of his symptoms but he is not interested in consideration of surgery at this point. He has requested injections into his carpal tunnels.\" Bilateral steroid injections done by Dr. Marx. He tolerated the injections well. Post injection instructions provided. Advised to return as needed for recurrence or progression of " problems.    Initially relief of symptoms in hands for about a month, then recurred end of November, 2023.     - pain L big toe - foot doctor - Dr. Mansfield - took x-rays - recommended surgery L big toe for bunion and arthritis/inflamation in [facility], but patient declined surgery - not clear it would help. Wears splint on L big toe at night.     - R shoulder pain - worsened by neck movements in past, now stable. Did PT. Also had shot in shoulder in the past. Also has R ankle pain. Not much help. Wears brace on R ankle.       NEUROLOGICAL EXAM in past:  On neurologic examination today, mental status unremarkable to informal testing. He looks comfortable sitting in the chair. History related in quite good detail with no obvious deficit of attention, memory or language. Fund of knowledge adequate. Orientation intact to person, place and time. Spontaneous speech fluent with no paraphasic or aphasic errors. On cranial nerve exam, pupils are equal, round and reactive to light. Extraocular movements full, without nystagmus. There is no bulbofacial weakness or ptosis. Tongue midline with no wasting or fasciculations. Palate elevates symmetrically. Facial sensation intact to light touch and cold bilaterally. Neck flexion and extension full strength. Motor examination reveals normal tone and bulk in upper and lower extremities bilaterally. No fasciculations. Full strength proximal and distal muscles upper and lower extremities bilaterally. Deep tendon reflexes difficult to elicit throughout upper and lower extremities bilaterally, including ankle jerks.Sensory examination reveals decreased pin prick and cold in all digits bilaterally up to the forearms and decreased pinprick and cold in distal lower legs to shins. Vibration slightly decreased at ankles and fingertips. Gait slow, due to pain. Wearing a walking boot R leg. On functional testing, patient can arise from chair with some difficulty, which he says is due to pain and  the fact that he has the boot on R leg.    Provider Impression/Plan:    IMPRESSION/PLAN:    -  Peripheral neuropathy - CIDP - continues to be very disabled by pain and neuropathy symptoms. Multiple regimens including membrane stabilizers, immunosuppressants, ketamine infusion,and Rituxan have not been successful. 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 He is also having systemic issues without a clear etiology at the moment, including unexplained weight gain, shortness of breath, increased swelling in his legs, tightness of his pelvic floor muscles, and more GI symptoms. He has responded to IVIG in the past, and after discussing situation with patient, plan is to restart IVIG infusions, for his presumed autoimmune, demyelinating neuropathy.  I have put in orders to start IVIG infusions, but [facility] has denied the IVIG, and I am appealing.     I was given [facility]: appeal phone number: [phone] for peer to peer, but was then told by Diamante at [facility] [phone] that the patient is not eligible for a peer to peer because he is on medicare. I was told I would have to fax an Expedited Appeal to [facility], Fax # [phone], service reference # [id number], to ask for an expedited appeal, which I will do.     - Carpal tunnel syndrome - bilaterally - Bilateral steroid injections by Dr. Marx were temporarily helpful.     - Left lateral foot pain -does not sound like Tarsal Tunnel Syndrome - symptoms sound more like irritation of the left sural nerve, likely secondary to acquired demyelinating peripheral neuropathy.     - Continue rheumatological follow  shannon Yan M.D.,Ph.D.    -----------------------------------------------------------------------------------------    History of Present Illness    Patient seen in follow up of his generalized pain and paresthesias, and presumed small fiber neuropathy.     Telehealth visit performed - originating site at PATIENT LOCATION of Home and distant site--PROVIDER LOCATION--in CLINIC.   - Verbal consent to participate in video visit obtained. This visit occurred during 2020 COVID19 pandemic. I discussed with patient nature of telehealth visits, that:   - I will evaluate patient and recommend diagnostics and treatments based on my assessment   - Our sessions are not being recorded and personal health information is protected   - Our team will provide follow up care in person if/when needed.   - Benefits of avoiding travel during the time of this coronavirus reviewed with patient.    INTERIM HISTORY SINCE 8/3/23  42 yo RH AAM last seen 8/3/23. Generalized pian, paresthesias in setting of Sjogren's (reportedly positive STEPHANY and SSB; negative anti-DS DNA, citrulline antibody, RF - I have not seen these lab results) with sicca symptoms, fibromyalgia, irritable bowel syndrome. Previous EMG: mild R CTS, no large fiber peripheral neuropathy, cervical radiculopathy, or denervatimg myopathy. MUSCLE BIOPSY - R quadriceps - 1/20/12 - SKELETAL MUSCLE DEMONSTRATING PROMINENT ENDOMYSIAL AND PERIMYSIAL FIBROUS TISSUE. NO INFLAMMATION, VASCULITIS, MUSCLE FIBER DEGENERATION. MAY INDICATE NON-INFLAMMATORY MUSCLE INVOLVEMENT IN COLLAGEN VASCULAR DISEASES. Punch skin biopsy 2014 essentially normal, with some mintor findings that may suggest small fiber neuropathy,    -Clinical presentation consistent with small fiber neuropathy, with superimposed bilateral CTS.    Small fiber neuropathy - Currently on combination of Gabapentin 1900 mg tid after increasing from 1800 mg tid at last visit (had previously taken as much as 2100 mg  tid, although thinks he may have been calculating the amount incorrectly at that time). Also takes alprazolam, Meloxicam, Oxycodone, Turmeric 500 mg bid for cramps, and at various times short courses of prednisone for symptomatic relief of pain and cramps. Advil prn. Narcan at home. At last visit with Rheuatology, started Flexeril for pain and cramps, and stopped baclofen and tizanidine. He does not think the increased Gabapentin dose at last visit, nor the recent addition of Flexeril, has helped the pain and extreme skin hypersensitivity. Describes excrutiating surface level pain in his calves and shins, especially if he's on his feet a lot. Feet feel tight and swollen, and red.      Treatment History: Pulse Solu-Medrol 1000 mg daily 3 days March, 2017, helped pain. Cymbalta, Lyrica, Topamax, Methotrexate - without pain relief. Imuran and CellCept ineffective. Unable to tolerate Cytoxan - nausea, fatigue. IVIG in past - 1 dose every 3 weeks - back in 2017, 2018 was somewhat helpful, but discontinued and received 2 infusions of Rituxan August, 2018, which did not help symptoms. Pain flared after stopping IVIG. Restarted Methotrexate - injectable form, which he had not had in past, but not effective. Thus, has failed Imuran, CellCept, methotrexate, Cytoxan, ? IVIG (there is some documentation that it helped for a while in 2017, 2018), Rituxan, Cymbalta, topamax. High dose steroids caused side effects. Followed by rheumatology - does not feel would be helpful to retry these meds, some of which have already been retried, or to try any new meds.     Pain management in past - Dr. Fan and then Dr. Santana.  Sees psychiatrist, Dr. Eliazar Doty - outside of . Was planning to start him on Buspar, but was on back order.    ROS:  - 15 - 20 pound weight gain in the past few weeks. Saw PCP. Sent to dietician - advised to change from plant based diet to more protein, but continues to gain weight. Has noted that legs are  "swollen lately.   - Very tired all the time, wakes up feeling tired and short of breath. Also short of breath walking upstairs. Has been ascribed to his weight gain. Had mild MARY JO 5 years ago, repeat sleep study ordered. Has been referred to Cardiology.    - Pelvic floor muscles very tight - abnormal pelvic floor movement by testing. Has been doing pelvic floor rehabilitation.   - Constipation and bloating alternating with diarrhea - IBS - has tried various remedies. Followed by GI. Recent unremarkable colonoscopy and EGD. Brother has Chrohn's Disease.   - Dry eyes lately    - bilateral CTS - bilateral wrist splints. Steroid injection R wrist March, 2017 helped for a while, but both hands felt worse at last visit. Also, had steroid injection R hand by rheumatology in the past - was having sharp pains at base of R index finger, for which steroid injection did help.     Saw Dr. Carl Marx, hand surgery, 9/19/23 Per Dr. Marx, \"Review of EMG study right upper extremity dated May 18, 2015 reveals evidence of mild right carpal tunnel syndrome. Impression: Bilateral carpal tunnel syndrome. Plan: He has never had any formal treatment or injections for the left side and he did well with injection on the right side in the past. He has not been offered repeat injections. He understands that surgery is likely his best option for definitive improvement of his symptoms but he is not interested in consideration of surgery at this point. He has requested injections into his carpal tunnels.\" Bilateral steroid injections done by Dr. Marx. He tolerated the injections well. Post injection instructions provided. Advised to return as needed for recurrence or progression of problems.    Initially relief of symptoms in hands for about a month, then recurred end of November, 2023.     - pain L big toe - foot doctor - Dr. Mansfield - took x-rays - recommended surgery L big toe for bunion and arthritis/inflamation in [facility], but patient " declined surgery - not clear it would help. Wears splint on L big toe at night.     - Now complains of pain and tingling on the lateral side of the left foot, especially if he everts his foot sideways or dorsiflexes his foot - feels a spike like pins and needles sensation down the lateral side of his foot. He says that when he touches his foot, it feels normal. Saw his PCP for this. Had Tinel's tapping on posterior lateral malleolus - shooting pain down the lateral side of the foot. She suggested possible tarsal tunnel syndrome, and suggested orthopedic referral.     - R shoulder pain - worsened by neck movements in past, now stable. Did PT. Also had shot in shoulder in the past. Also has R ankle pain. Not much help. Wears brace on R ankle.       Neuro Exam:  Mental status unremarkable to informal testing. History related in good detail with no obvious deficit of attention, memory, or language. Fund of knowledge adequate. Orientation intact to person, place, and time. Spontaneous speech fluent with no paraphasic or aphasic errors. Moves arm, hands, legs well - no muscle wasting appreciated over video feed. No pitting edema appreciated when I asked patient to press in on his lower leg.     NEUROLOGICAL EXAM in past:  On neurologic examination today, mental status unremarkable to informal testing. He looks comfortable sitting in the chair. History related in quite good detail with no obvious deficit of attention, memory or language. Fund of knowledge adequate. Orientation intact to person, place and time. Spontaneous speech fluent with no paraphasic or aphasic errors. On cranial nerve exam, pupils are equal, round and reactive to light. Extraocular movements full, without nystagmus. There is no bulbofacial weakness or ptosis. Tongue midline with no wasting or fasciculations. Palate elevates symmetrically. Facial sensation intact to light touch and cold bilaterally. Neck flexion and extension full strength. Motor  examination reveals normal tone and bulk in upper and lower extremities bilaterally. No fasciculations. Full strength proximal and distal muscles upper and lower extremities bilaterally. Deep tendon reflexes difficult to elicit throughout upper and lower extremities bilaterally, including ankle jerks.Sensory examination reveals decreased pin prick and cold in all digits bilaterally up to the forearms and decreased pinprick and cold in distal lower legs to shins. Vibration slightly decreased at ankles and fingertips. Gait slow, due to pain. Wearing a walking boot R leg. On functional testing, patient can arise from chair with some difficulty, which he says is due to pain and the fact that he has the boot on R leg.    Provider Impression/Plan:    IMPRESSION:  Diffuse pain and distal sensory loss to pinprick and cold on previous exams with numbness and tingling, and probable bilateral carpal tunnel syndrome,in setting of Sjogrens and fibromyalgia - examination consistent with primarily SMALL FIBER SENSORY neuropathy, likely secondary to underlying autoimmune disorder, with superimposed bilateral carpal tunnel syndrome. Muscle biopsy abnormal - consistent with NON-INFLAMMATORY MUSCLE DISORDER, as can be seen in collagen vascular disease (increased epimysial and perimysial fibrosis).     PLAN:   - Carpal tunnel syndrome - bilaterally - Bilateral steroid injections by Dr. Marx were temporarily helpful. I think he will likely need bilateral carpal tunnel release surgery.      -  Small fiber neuropathy - He continues to be very disabled by pain and small fiber neuropathy symptoms. Multiple regimens including membrane stabilizers, immunosuppressants, ketamine infusion,and Rituxan have not been successful. IVIG may have been helpful, after a careful review of his records. He seems to be having many systemic issues without a clear etiology at the moment, including unexplained weight gain, shortness of breath, increased  swelling in his legs, tightness of his pelvic floor muscles, and more GI symptoms. Gabapentin dose very high at the moment, and I do not want to go any higher on this until he has had further investigation of his other symptoms, which is in process. I explained to him that Gabapentin can cause swelling, although he has been on Gabapentin for a long time, without those side effects. I will follow his progress with his further evaluation, and be in touch with him regarding other lab studies I may want to obtain, including screening tests for amyloid., given his painful neuropathy, bilateral carpal tunnel syndrome, GI symptoms, and other systemic issues.      - Left lateral foot pain -does not sound like Tarsal Tunnel Syndrome - symptoms sound more like irritation of the left sural nerve. I will order a Neuromuscular ultrasound of the left sural nerve, and evaluation for possible tarsal tunnel syndrome.     - Continue rheumatological follow up.     - I will be in touch with him after the Neuromuscular Ultrasound is completed, and after he has seen cardiology, and discuss further options at that time. After reviewing his records, I am wondering whether another course of IVIG may be helpful.     - Continue social distancing. He has increased risk factors, including having an autoimmune disorder, chronic asthma, and being .     He knows that he can call me as needed and let me know how he is doing. [phone].    Polina Yan M.D.,Ph.D.

## 2025-05-01 ENCOUNTER — APPOINTMENT (OUTPATIENT)
Dept: ALLERGY | Facility: CLINIC | Age: 45
End: 2025-05-01
Payer: MEDICARE

## 2025-05-01 DIAGNOSIS — J30.89 ALLERGIC RHINITIS DUE TO OTHER ALLERGIC TRIGGER, UNSPECIFIED SEASONALITY: ICD-10-CM

## 2025-05-01 PROCEDURE — 95117 IMMUNOTHERAPY INJECTIONS: CPT | Performed by: ALLERGY & IMMUNOLOGY

## 2025-05-01 NOTE — PROGRESS NOTES
Progress Notes  Polina Yan MD PhD (Physician)  Neurology  [facility]  Fax # [phone]  EXPEDITED APPEAL for IVIG for patient     Re: Mr. Luis Condon   1980  Member ID: [id number]  Service Reference # [id number]     To Whom It May Concern:     Documentation of Events and Interim history since 10/29/24      Mr. Condon is a 45 yo RH AAM last seen for clinical visit 10/29/24. Generalized pain, paresthesias in setting of Sjogren's (reportedly positive STEPHANY and SSB; negative anti-DS DNA, citrulline antibody, RF - with sicca symptoms, fibromyalgia, irritable bowel syndrome). Patient last seen in follow up of his generalized pain and paresthesias, and neuropathy, on 10/29/2024.  Since that last visit, there have been several new developments regarding his peripheral neuropathy.        Neuropathy - Currently on Gabapentin 1900 mg tid after increasing from 1800 mg tid at last visit (had previously taken as much as 2100 mg tid, although thinks he may have been calculating the amount incorrectly at that time). Also takes alprazolam, Meloxicam, Oxycodone, Turmeric 500 mg bid for cramps, and at various times short courses of prednisone for symptomatic relief of pain and cramps. Advil prn. Narcan at home. Per rheumatology, had restarted Flexeril for pain and cramps, and stopped baclofen and tizanidine. Does not think the increased Gabapentin dose, nor the recent addition of Flexeril, has helped the pain and extreme skin hypersensitivity. Describes excrutiating surface level pain in his calves and shins, especially if he's on his feet a lot. Feet feel tight and swollen, and red.       Treatment History: Pulse Solu-Medrol 1000 mg daily 3 days , helped pain. Cymbalta, Lyrica, Topamax, Methotrexate - without pain relief. Imuran and CellCept ineffective. Unable to tolerate Cytoxan - nausea, fatigue. IVIG in past - 1 dose every 3 weeks - back in ,  was helpful, but rheumatology discontinued  "and patient was then given 2 infusions Rituxan August, 2018, which did not help symptoms. Pain flared after stopping IVIG.  Rheumatology did not choose to restart the IVIG, which had been helpful in the past. Restarted Methotrexate - injectable form, which he had not had in past, but not effective. Thus, has failed Imuran, CellCept, methotrexate, Cytoxan.  Documentation reveals IVIG helped in 2017, 2018), but was discontinued, and given, Rituxan, Cymbalta, topamax. High dose steroids caused side effects. Followed by rheumatology.       - Dry eyes lately     - Peripheral Neuropathy     Patient underwent several blood tests to look for possible causes of his pain and sensory symptoms. HMGCoA antibodies normal, which were checked to see if some of his muscle problems/pain could be due to having been on lipitor in the past. Gene testing for the TTR gene (hereditary amyloid) normal. Kappa/lambda ratio, SPEP/CR, and 24 hour urine protein/CR - very mild abnormalities, but to my reading there was nothing worrisome there, the mild changes could be due to his presumed autoimmune disease.      In addition to patient's longstanding neuropathic symptoms, with generalized pain and paresthesias- he developed pain and tingling on lateral side left foot, few months ago, especially if he everts foot sideways or dorsiflexes foot - feels a spike like pins and needles sensation down the lateral side of his foot. When he touches his foot, it feels normal. Saw his PCP for this. Had Tinel's tapping on posterior lateral malleolus - shooting pain down the lateral side of the foot. She suggested possible tarsal tunnel syndrome, and suggested orthopedic referral.      Patient underwent Neuromuscular Ultrasound 7/25/24.  Dr. Allen Montiel:  \"IMPRESSION: Severely abnormal and complex Neuromuscular ultrasound examination of the nerves in the right and left upper extremities as well as a limited study of the nerves in the left lower extremity " "and a bilateral limited study of the neck (brachial plexus).    \"There was ultrasound evidence of a multifocal hypertrophic neuropathy with preferential involvement of entrapment sites. All the underlying musculature appeared normal as did all the bony, vascular and ligamentous structures. However there was marked nerve hypertrophy of both ulnar nerves at the retrocondylar groove. There was mild nerve hypertrophy of both median nerves at the carpal tunnel. However on the left the median nerve was also enlarged in the forearm segment. On the left there was definite enlargement and fascicular abnormalities of the median nerve in the upper arm and near the axilla.     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  Other causes of multifocal hypertrophic neuropathy cannot be excluded by this study.  However the lack of uniform nerve hypertrophy strongly argue against an inherited demyelinating neuropathy such as Charcot-Anabel-Tooth.\"     Patient then underwent extensive genetic testing for inherited forms of hypertrophic/demyelinating peripheral neuropathy, which were all negative/uninformative.      A left sural nerve and gastroc muscle biopsy consistent with neuropathy.      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      My request to treat patient with IVIG therapy has been denied by [facility]. He has responded " well to IVIG therapy in the past, with reduction in symptoms. His most recent neuromuscular ultrasound, as noted above, is consistent with a generalized multifocal hypertrophic neuropathy, with no genetic cause found, and which is consistent with a diagnosis of acquired chronic inflammatory demyelinating polyneuropathy, especially in the setting of other autoimmune disorders (patient has Sjogren's Syndrome).     I am therefore submitting this expedited appeal to request IVIG therapy for Mr. Luis Condon, for his chronic inflammatory demyelinating polyneuropathy. Please let me know if you need any further information.      Sincerely,     Polina Yan M.D.,Ph.D.       Electronically signed by Polina Yan MD PhD at 4/30/2025  4:19 PM

## 2025-05-05 ENCOUNTER — APPOINTMENT (OUTPATIENT)
Dept: INFUSION THERAPY | Facility: HOSPITAL | Age: 45
End: 2025-05-05
Payer: MEDICARE

## 2025-05-06 ENCOUNTER — INFUSION (OUTPATIENT)
Dept: INFUSION THERAPY | Facility: HOSPITAL | Age: 45
End: 2025-05-06
Payer: MEDICARE

## 2025-05-06 VITALS
WEIGHT: 242.95 LBS | RESPIRATION RATE: 16 BRPM | OXYGEN SATURATION: 97 % | DIASTOLIC BLOOD PRESSURE: 79 MMHG | SYSTOLIC BLOOD PRESSURE: 131 MMHG | HEART RATE: 60 BPM | BODY MASS INDEX: 33.88 KG/M2 | TEMPERATURE: 99.1 F

## 2025-05-06 DIAGNOSIS — M35.01 SJOGREN'S SYNDROME WITH KERATOCONJUNCTIVITIS SICCA: ICD-10-CM

## 2025-05-06 DIAGNOSIS — G62.9 SMALL FIBER NEUROPATHY: ICD-10-CM

## 2025-05-06 PROCEDURE — 2500000004 HC RX 250 GENERAL PHARMACY W/ HCPCS (ALT 636 FOR OP/ED): Mod: JZ | Performed by: PSYCHIATRY & NEUROLOGY

## 2025-05-06 PROCEDURE — 96366 THER/PROPH/DIAG IV INF ADDON: CPT | Mod: INF

## 2025-05-06 PROCEDURE — 2500000001 HC RX 250 WO HCPCS SELF ADMINISTERED DRUGS (ALT 637 FOR MEDICARE OP): Performed by: PSYCHIATRY & NEUROLOGY

## 2025-05-06 PROCEDURE — 96375 TX/PRO/DX INJ NEW DRUG ADDON: CPT | Mod: INF

## 2025-05-06 PROCEDURE — 96365 THER/PROPH/DIAG IV INF INIT: CPT | Mod: INF

## 2025-05-06 RX ORDER — FAMOTIDINE 10 MG/ML
20 INJECTION, SOLUTION INTRAVENOUS ONCE AS NEEDED
Status: CANCELLED | OUTPATIENT
Start: 2025-05-07

## 2025-05-06 RX ORDER — DIPHENHYDRAMINE HYDROCHLORIDE 50 MG/ML
50 INJECTION, SOLUTION INTRAMUSCULAR; INTRAVENOUS AS NEEDED
Status: CANCELLED | OUTPATIENT
Start: 2025-05-07

## 2025-05-06 RX ORDER — EPINEPHRINE 0.3 MG/.3ML
0.3 INJECTION SUBCUTANEOUS EVERY 5 MIN PRN
Status: CANCELLED | OUTPATIENT
Start: 2025-05-07

## 2025-05-06 RX ORDER — ALBUTEROL SULFATE 0.83 MG/ML
3 SOLUTION RESPIRATORY (INHALATION) AS NEEDED
Status: CANCELLED | OUTPATIENT
Start: 2025-05-07

## 2025-05-06 RX ORDER — ACETAMINOPHEN 325 MG/1
650 TABLET ORAL ONCE
Status: CANCELLED | OUTPATIENT
Start: 2025-05-07

## 2025-05-06 RX ORDER — ACETAMINOPHEN 325 MG/1
650 TABLET ORAL ONCE
Status: COMPLETED | OUTPATIENT
Start: 2025-05-06 | End: 2025-05-06

## 2025-05-06 RX ORDER — DIPHENHYDRAMINE HCL 25 MG
25 CAPSULE ORAL ONCE
Status: CANCELLED | OUTPATIENT
Start: 2025-05-07

## 2025-05-06 RX ORDER — DIPHENHYDRAMINE HCL 25 MG
25 CAPSULE ORAL ONCE
Status: COMPLETED | OUTPATIENT
Start: 2025-05-06 | End: 2025-05-06

## 2025-05-06 RX ADMIN — DIPHENHYDRAMINE HYDROCHLORIDE 25 MG: 25 CAPSULE ORAL at 12:03

## 2025-05-06 RX ADMIN — HYDROCORTISONE SODIUM SUCCINATE 100 MG: 100 INJECTION, POWDER, FOR SOLUTION INTRAMUSCULAR; INTRAVENOUS at 12:10

## 2025-05-06 RX ADMIN — IMMUNE GLOBULIN INFUSION (HUMAN) 35 G: 100 INJECTION, SOLUTION INTRAVENOUS; SUBCUTANEOUS at 12:34

## 2025-05-06 RX ADMIN — ACETAMINOPHEN 650 MG: 325 TABLET ORAL at 12:02

## 2025-05-06 SDOH — ECONOMIC STABILITY: FOOD INSECURITY: WITHIN THE PAST 12 MONTHS, THE FOOD YOU BOUGHT JUST DIDN'T LAST AND YOU DIDN'T HAVE MONEY TO GET MORE.: NEVER TRUE

## 2025-05-06 SDOH — ECONOMIC STABILITY: FOOD INSECURITY: WITHIN THE PAST 12 MONTHS, YOU WORRIED THAT YOUR FOOD WOULD RUN OUT BEFORE YOU GOT MONEY TO BUY MORE.: NEVER TRUE

## 2025-05-06 ASSESSMENT — PATIENT HEALTH QUESTIONNAIRE - PHQ9
1. LITTLE INTEREST OR PLEASURE IN DOING THINGS: SEVERAL DAYS
2. FEELING DOWN, DEPRESSED OR HOPELESS: SEVERAL DAYS
10. IF YOU CHECKED OFF ANY PROBLEMS, HOW DIFFICULT HAVE THESE PROBLEMS MADE IT FOR YOU TO DO YOUR WORK, TAKE CARE OF THINGS AT HOME, OR GET ALONG WITH OTHER PEOPLE: VERY DIFFICULT
SUM OF ALL RESPONSES TO PHQ9 QUESTIONS 1 AND 2: 2

## 2025-05-06 ASSESSMENT — COLUMBIA-SUICIDE SEVERITY RATING SCALE - C-SSRS
1. IN THE PAST MONTH, HAVE YOU WISHED YOU WERE DEAD OR WISHED YOU COULD GO TO SLEEP AND NOT WAKE UP?: NO
6. HAVE YOU EVER DONE ANYTHING, STARTED TO DO ANYTHING, OR PREPARED TO DO ANYTHING TO END YOUR LIFE?: NO
2. HAVE YOU ACTUALLY HAD ANY THOUGHTS OF KILLING YOURSELF?: NO

## 2025-05-06 ASSESSMENT — ENCOUNTER SYMPTOMS
LOSS OF SENSATION IN FEET: 1
OCCASIONAL FEELINGS OF UNSTEADINESS: 0
DEPRESSION: 1

## 2025-05-06 ASSESSMENT — PAIN SCALES - GENERAL: PAINLEVEL_OUTOF10: 7

## 2025-05-07 ENCOUNTER — INFUSION (OUTPATIENT)
Dept: INFUSION THERAPY | Facility: HOSPITAL | Age: 45
End: 2025-05-07
Payer: MEDICARE

## 2025-05-07 VITALS
HEART RATE: 63 BPM | BODY MASS INDEX: 33.82 KG/M2 | WEIGHT: 242.51 LBS | RESPIRATION RATE: 16 BRPM | SYSTOLIC BLOOD PRESSURE: 134 MMHG | OXYGEN SATURATION: 97 % | DIASTOLIC BLOOD PRESSURE: 82 MMHG | TEMPERATURE: 98.8 F

## 2025-05-07 DIAGNOSIS — M35.01 SJOGREN'S SYNDROME WITH KERATOCONJUNCTIVITIS SICCA: ICD-10-CM

## 2025-05-07 DIAGNOSIS — G62.9 SMALL FIBER NEUROPATHY: ICD-10-CM

## 2025-05-07 PROCEDURE — 96366 THER/PROPH/DIAG IV INF ADDON: CPT | Mod: INF

## 2025-05-07 PROCEDURE — 2500000001 HC RX 250 WO HCPCS SELF ADMINISTERED DRUGS (ALT 637 FOR MEDICARE OP): Performed by: PSYCHIATRY & NEUROLOGY

## 2025-05-07 PROCEDURE — 96365 THER/PROPH/DIAG IV INF INIT: CPT | Mod: INF

## 2025-05-07 PROCEDURE — 2500000004 HC RX 250 GENERAL PHARMACY W/ HCPCS (ALT 636 FOR OP/ED): Mod: JZ | Performed by: PSYCHIATRY & NEUROLOGY

## 2025-05-07 PROCEDURE — 96375 TX/PRO/DX INJ NEW DRUG ADDON: CPT | Mod: INF

## 2025-05-07 RX ORDER — DIPHENHYDRAMINE HCL 25 MG
25 CAPSULE ORAL ONCE
Status: COMPLETED | OUTPATIENT
Start: 2025-05-07 | End: 2025-05-07

## 2025-05-07 RX ORDER — ACETAMINOPHEN 325 MG/1
650 TABLET ORAL ONCE
Status: CANCELLED | OUTPATIENT
Start: 2025-05-08

## 2025-05-07 RX ORDER — EPINEPHRINE 0.3 MG/.3ML
0.3 INJECTION SUBCUTANEOUS EVERY 5 MIN PRN
Status: CANCELLED | OUTPATIENT
Start: 2025-05-08

## 2025-05-07 RX ORDER — DIPHENHYDRAMINE HYDROCHLORIDE 50 MG/ML
50 INJECTION, SOLUTION INTRAMUSCULAR; INTRAVENOUS AS NEEDED
Status: CANCELLED | OUTPATIENT
Start: 2025-05-08

## 2025-05-07 RX ORDER — ACETAMINOPHEN 325 MG/1
650 TABLET ORAL ONCE
Status: COMPLETED | OUTPATIENT
Start: 2025-05-07 | End: 2025-05-07

## 2025-05-07 RX ORDER — FAMOTIDINE 10 MG/ML
20 INJECTION, SOLUTION INTRAVENOUS ONCE AS NEEDED
Status: CANCELLED | OUTPATIENT
Start: 2025-05-08

## 2025-05-07 RX ORDER — ALBUTEROL SULFATE 0.83 MG/ML
3 SOLUTION RESPIRATORY (INHALATION) AS NEEDED
Status: CANCELLED | OUTPATIENT
Start: 2025-05-08

## 2025-05-07 RX ORDER — DIPHENHYDRAMINE HCL 25 MG
25 CAPSULE ORAL ONCE
Status: CANCELLED | OUTPATIENT
Start: 2025-05-08

## 2025-05-07 RX ADMIN — DIPHENHYDRAMINE HYDROCHLORIDE 25 MG: 25 CAPSULE ORAL at 11:49

## 2025-05-07 RX ADMIN — IMMUNE GLOBULIN INFUSION (HUMAN) 35 G: 100 INJECTION, SOLUTION INTRAVENOUS; SUBCUTANEOUS at 12:35

## 2025-05-07 RX ADMIN — ACETAMINOPHEN 650 MG: 325 TABLET ORAL at 11:49

## 2025-05-07 RX ADMIN — HYDROCORTISONE SODIUM SUCCINATE 100 MG: 100 INJECTION, POWDER, FOR SOLUTION INTRAMUSCULAR; INTRAVENOUS at 11:49

## 2025-05-07 ASSESSMENT — PAIN SCALES - GENERAL: PAINLEVEL_OUTOF10: 6

## 2025-05-08 ENCOUNTER — INFUSION (OUTPATIENT)
Dept: INFUSION THERAPY | Facility: HOSPITAL | Age: 45
End: 2025-05-08
Payer: MEDICARE

## 2025-05-08 VITALS
SYSTOLIC BLOOD PRESSURE: 119 MMHG | RESPIRATION RATE: 16 BRPM | DIASTOLIC BLOOD PRESSURE: 82 MMHG | HEART RATE: 60 BPM | OXYGEN SATURATION: 99 %

## 2025-05-08 DIAGNOSIS — G62.9 SMALL FIBER NEUROPATHY: ICD-10-CM

## 2025-05-08 DIAGNOSIS — M35.01 SJOGREN'S SYNDROME WITH KERATOCONJUNCTIVITIS SICCA: ICD-10-CM

## 2025-05-08 PROCEDURE — 96366 THER/PROPH/DIAG IV INF ADDON: CPT | Mod: INF

## 2025-05-08 PROCEDURE — 2500000001 HC RX 250 WO HCPCS SELF ADMINISTERED DRUGS (ALT 637 FOR MEDICARE OP): Performed by: PSYCHIATRY & NEUROLOGY

## 2025-05-08 PROCEDURE — 96365 THER/PROPH/DIAG IV INF INIT: CPT | Mod: INF

## 2025-05-08 PROCEDURE — 2500000004 HC RX 250 GENERAL PHARMACY W/ HCPCS (ALT 636 FOR OP/ED): Mod: JZ | Performed by: PSYCHIATRY & NEUROLOGY

## 2025-05-08 PROCEDURE — 96375 TX/PRO/DX INJ NEW DRUG ADDON: CPT | Mod: INF

## 2025-05-08 RX ORDER — DIPHENHYDRAMINE HYDROCHLORIDE 50 MG/ML
50 INJECTION, SOLUTION INTRAMUSCULAR; INTRAVENOUS AS NEEDED
OUTPATIENT
Start: 2025-05-09

## 2025-05-08 RX ORDER — DIPHENHYDRAMINE HCL 25 MG
25 CAPSULE ORAL ONCE
Status: COMPLETED | OUTPATIENT
Start: 2025-05-08 | End: 2025-05-08

## 2025-05-08 RX ORDER — ALBUTEROL SULFATE 0.83 MG/ML
3 SOLUTION RESPIRATORY (INHALATION) AS NEEDED
OUTPATIENT
Start: 2025-05-09

## 2025-05-08 RX ORDER — FAMOTIDINE 10 MG/ML
20 INJECTION, SOLUTION INTRAVENOUS ONCE AS NEEDED
OUTPATIENT
Start: 2025-05-09

## 2025-05-08 RX ORDER — EPINEPHRINE 0.3 MG/.3ML
0.3 INJECTION SUBCUTANEOUS EVERY 5 MIN PRN
OUTPATIENT
Start: 2025-05-09

## 2025-05-08 RX ORDER — ACETAMINOPHEN 325 MG/1
650 TABLET ORAL ONCE
Status: COMPLETED | OUTPATIENT
Start: 2025-05-08 | End: 2025-05-08

## 2025-05-08 RX ORDER — ACETAMINOPHEN 325 MG/1
650 TABLET ORAL ONCE
Status: CANCELLED | OUTPATIENT
Start: 2025-05-09

## 2025-05-08 RX ORDER — DIPHENHYDRAMINE HCL 25 MG
25 CAPSULE ORAL ONCE
Status: CANCELLED | OUTPATIENT
Start: 2025-05-09

## 2025-05-08 RX ADMIN — HYDROCORTISONE SODIUM SUCCINATE 100 MG: 100 INJECTION, POWDER, FOR SOLUTION INTRAMUSCULAR; INTRAVENOUS at 12:09

## 2025-05-08 RX ADMIN — DIPHENHYDRAMINE HYDROCHLORIDE 25 MG: 25 CAPSULE ORAL at 12:09

## 2025-05-08 RX ADMIN — ACETAMINOPHEN 650 MG: 325 TABLET ORAL at 12:09

## 2025-05-08 RX ADMIN — IMMUNE GLOBULIN INFUSION (HUMAN) 35 G: 100 INJECTION, SOLUTION INTRAVENOUS; SUBCUTANEOUS at 12:29

## 2025-05-08 ASSESSMENT — ENCOUNTER SYMPTOMS
OCCASIONAL FEELINGS OF UNSTEADINESS: 0
DEPRESSION: 0
LOSS OF SENSATION IN FEET: 0

## 2025-05-09 ENCOUNTER — INFUSION (OUTPATIENT)
Dept: INFUSION THERAPY | Facility: HOSPITAL | Age: 45
End: 2025-05-09
Payer: MEDICARE

## 2025-05-09 VITALS
BODY MASS INDEX: 34.25 KG/M2 | WEIGHT: 245.59 LBS | OXYGEN SATURATION: 97 % | DIASTOLIC BLOOD PRESSURE: 99 MMHG | SYSTOLIC BLOOD PRESSURE: 147 MMHG | RESPIRATION RATE: 16 BRPM | TEMPERATURE: 97.6 F | HEART RATE: 54 BPM

## 2025-05-09 DIAGNOSIS — M35.01 SJOGREN'S SYNDROME WITH KERATOCONJUNCTIVITIS SICCA: ICD-10-CM

## 2025-05-09 DIAGNOSIS — G62.9 SMALL FIBER NEUROPATHY: ICD-10-CM

## 2025-05-09 PROCEDURE — 2500000001 HC RX 250 WO HCPCS SELF ADMINISTERED DRUGS (ALT 637 FOR MEDICARE OP): Performed by: PSYCHIATRY & NEUROLOGY

## 2025-05-09 PROCEDURE — 96365 THER/PROPH/DIAG IV INF INIT: CPT | Mod: INF

## 2025-05-09 PROCEDURE — 2500000004 HC RX 250 GENERAL PHARMACY W/ HCPCS (ALT 636 FOR OP/ED): Mod: JZ | Performed by: PSYCHIATRY & NEUROLOGY

## 2025-05-09 PROCEDURE — 96375 TX/PRO/DX INJ NEW DRUG ADDON: CPT | Mod: INF

## 2025-05-09 PROCEDURE — 96366 THER/PROPH/DIAG IV INF ADDON: CPT | Mod: INF

## 2025-05-09 RX ORDER — FAMOTIDINE 10 MG/ML
20 INJECTION, SOLUTION INTRAVENOUS ONCE AS NEEDED
OUTPATIENT
Start: 2025-05-09

## 2025-05-09 RX ORDER — DIPHENHYDRAMINE HCL 25 MG
25 CAPSULE ORAL ONCE
OUTPATIENT
Start: 2025-05-09

## 2025-05-09 RX ORDER — ALBUTEROL SULFATE 0.83 MG/ML
3 SOLUTION RESPIRATORY (INHALATION) AS NEEDED
OUTPATIENT
Start: 2025-05-09

## 2025-05-09 RX ORDER — DIPHENHYDRAMINE HYDROCHLORIDE 50 MG/ML
50 INJECTION, SOLUTION INTRAMUSCULAR; INTRAVENOUS AS NEEDED
OUTPATIENT
Start: 2025-05-09

## 2025-05-09 RX ORDER — DIPHENHYDRAMINE HCL 25 MG
25 CAPSULE ORAL ONCE
Status: COMPLETED | OUTPATIENT
Start: 2025-05-09 | End: 2025-05-09

## 2025-05-09 RX ORDER — ACETAMINOPHEN 325 MG/1
650 TABLET ORAL ONCE
Status: COMPLETED | OUTPATIENT
Start: 2025-05-09 | End: 2025-05-09

## 2025-05-09 RX ORDER — EPINEPHRINE 0.3 MG/.3ML
0.3 INJECTION SUBCUTANEOUS EVERY 5 MIN PRN
OUTPATIENT
Start: 2025-05-09

## 2025-05-09 RX ORDER — ACETAMINOPHEN 325 MG/1
650 TABLET ORAL ONCE
OUTPATIENT
Start: 2025-05-09

## 2025-05-09 RX ADMIN — DIPHENHYDRAMINE HYDROCHLORIDE 25 MG: 25 CAPSULE ORAL at 08:36

## 2025-05-09 RX ADMIN — HYDROCORTISONE SODIUM SUCCINATE 100 MG: 100 INJECTION, POWDER, FOR SOLUTION INTRAMUSCULAR; INTRAVENOUS at 08:36

## 2025-05-09 RX ADMIN — ACETAMINOPHEN 650 MG: 325 TABLET ORAL at 08:36

## 2025-05-09 RX ADMIN — IMMUNE GLOBULIN INFUSION (HUMAN) 45 G: 100 INJECTION, SOLUTION INTRAVENOUS; SUBCUTANEOUS at 09:11

## 2025-05-09 ASSESSMENT — PATIENT HEALTH QUESTIONNAIRE - PHQ9
10. IF YOU CHECKED OFF ANY PROBLEMS, HOW DIFFICULT HAVE THESE PROBLEMS MADE IT FOR YOU TO DO YOUR WORK, TAKE CARE OF THINGS AT HOME, OR GET ALONG WITH OTHER PEOPLE: SOMEWHAT DIFFICULT
2. FEELING DOWN, DEPRESSED OR HOPELESS: SEVERAL DAYS
SUM OF ALL RESPONSES TO PHQ9 QUESTIONS 1 AND 2: 2
1. LITTLE INTEREST OR PLEASURE IN DOING THINGS: SEVERAL DAYS

## 2025-05-09 ASSESSMENT — ENCOUNTER SYMPTOMS
LOSS OF SENSATION IN FEET: 0
OCCASIONAL FEELINGS OF UNSTEADINESS: 0
DEPRESSION: 0

## 2025-05-09 ASSESSMENT — PAIN SCALES - GENERAL: PAINLEVEL_OUTOF10: 6

## 2025-05-20 ENCOUNTER — APPOINTMENT (OUTPATIENT)
Dept: NEUROSURGERY | Facility: CLINIC | Age: 45
End: 2025-05-20
Payer: MEDICARE

## 2025-05-20 VITALS
HEART RATE: 62 BPM | TEMPERATURE: 99.4 F | SYSTOLIC BLOOD PRESSURE: 142 MMHG | HEIGHT: 71 IN | DIASTOLIC BLOOD PRESSURE: 98 MMHG | WEIGHT: 235 LBS | BODY MASS INDEX: 32.9 KG/M2

## 2025-05-20 DIAGNOSIS — G62.89 OTHER POLYNEUROPATHY: Primary | ICD-10-CM

## 2025-05-20 PROCEDURE — 99024 POSTOP FOLLOW-UP VISIT: CPT | Performed by: STUDENT IN AN ORGANIZED HEALTH CARE EDUCATION/TRAINING PROGRAM

## 2025-05-20 PROCEDURE — 3008F BODY MASS INDEX DOCD: CPT | Performed by: STUDENT IN AN ORGANIZED HEALTH CARE EDUCATION/TRAINING PROGRAM

## 2025-05-20 PROCEDURE — 1036F TOBACCO NON-USER: CPT | Performed by: STUDENT IN AN ORGANIZED HEALTH CARE EDUCATION/TRAINING PROGRAM

## 2025-05-20 PROCEDURE — 3080F DIAST BP >= 90 MM HG: CPT | Performed by: STUDENT IN AN ORGANIZED HEALTH CARE EDUCATION/TRAINING PROGRAM

## 2025-05-20 PROCEDURE — 3077F SYST BP >= 140 MM HG: CPT | Performed by: STUDENT IN AN ORGANIZED HEALTH CARE EDUCATION/TRAINING PROGRAM

## 2025-05-20 ASSESSMENT — PATIENT HEALTH QUESTIONNAIRE - PHQ9
2. FEELING DOWN, DEPRESSED OR HOPELESS: SEVERAL DAYS
SUM OF ALL RESPONSES TO PHQ9 QUESTIONS 1 & 2: 2
10. IF YOU CHECKED OFF ANY PROBLEMS, HOW DIFFICULT HAVE THESE PROBLEMS MADE IT FOR YOU TO DO YOUR WORK, TAKE CARE OF THINGS AT HOME, OR GET ALONG WITH OTHER PEOPLE: NOT DIFFICULT AT ALL
1. LITTLE INTEREST OR PLEASURE IN DOING THINGS: SEVERAL DAYS

## 2025-05-20 ASSESSMENT — PAIN SCALES - GENERAL: PAINLEVEL_OUTOF10: 7

## 2025-05-20 NOTE — PROGRESS NOTES
[facility]  Department of Neurological Surgery  Post Operative Patient Visit      History of Present Illness:  Luis Condon Jr. is a 44 y.o. year old male who presents for a post operative visit following a biopsy of the LEFT sural nerve and medial gastrocnemius muscle on 2/14/25 at [facility].      Luis Condon Jr. is a 44 y.o. year old male who presents with diffuse pain and paresthesia symptoms of unknown etiology with recent sensory loss in the left leg. He was referred by neurology for a LEFT Sural Nerve and Gastrocnemius muscle biopsy. He states the symptoms have progressed every year and has become very debilitating. He states that Dr. Yan discussed extensively with him the benefit if a biopsy.  The request seems appropriate in light of his progressive and debilitating symptoms.      His pre-op sural nerve sensation was roughly 70% of normal.      PATHOLOGY FINAL DIAGNOSIS   A.  Peripheral nerve, left sural, biopsy:  - Peripheral nerve with mild axonal loss, thinly myelinated axons, and rare structures suggestive of axonal sprouts (see comment).  B.  Skeletal muscle, gastrocnemius, biopsy:  - Skeletal muscle, no significant histopathological abnormalities.     POV 3/4/25 Luis is having some neuropathic pain in the sural distribution.  It has already improved significantly for the first week.  This can be provoked by palpating the area proximal to the incision.  The area is numb as expected.  He had a neuromuscular ultrasound performed this week due to the symptoms and nothing was found, as I would expect. Neuropathic pain is a known early complication and most do improve over several weeks.  Unfortunately, the nerve biopsy was not helpful in diagnosis.  His incisions are healing well.     Luis will follow-up with me by phone in 4 weeks to let me know how the pain is doing.  If still significant, I may have him get a local steroid injection.    Today, Luis continues to  have some issues at his biopsy site.  The severe pain is largely subsided.  He does have numbness and tingling in the expected distribution but also that extends into the heel which is most bothersome.  Walking for periods of time can elicit some shooting pain in the sural distribution and also when he is showering.    Overall the neuropathic pain from the biopsy site has improved so I would like to see where he has had another 3 months as most neuropathic pain should be resolved by this time.  His incision is well-healed      The above clinical summary has been dictated with voice recognition software. It has not been proofread for grammatical errors, typographical mistakes, or other semantic inconsistencies.    Thank you for visiting our office today. It was our pleasure to take part in your healthcare.     Do not hesitate to call with any questions regarding your plan of care after leaving at [phone] M-F 8am-4pm.     To clinicians, thank you very much for this kind referral. It is a privilege to partner with you in the care of your patients. My office would be delighted to assist you with any further consultations or with questions regarding the plan of care outlined. Do not hesitate to call the office or contact me directly.       Sincerely,      Parker Francis MD, PhD  Attending Neurosurgeon, [facility]   of Neurological Surgery  [employer]  Office: [phone]  Fax: [phone]    [facility]  [address]  [address] [address]

## 2025-05-22 ENCOUNTER — OFFICE VISIT (OUTPATIENT)
Dept: RHEUMATOLOGY | Facility: CLINIC | Age: 45
End: 2025-05-22
Payer: MEDICARE

## 2025-05-22 VITALS
OXYGEN SATURATION: 97 % | HEART RATE: 72 BPM | BODY MASS INDEX: 33.64 KG/M2 | DIASTOLIC BLOOD PRESSURE: 81 MMHG | WEIGHT: 241.2 LBS | SYSTOLIC BLOOD PRESSURE: 126 MMHG

## 2025-05-22 DIAGNOSIS — M79.2 NEUROPATHIC PAIN: Primary | ICD-10-CM

## 2025-05-22 DIAGNOSIS — Z79.891 ENCOUNTER FOR LONG-TERM OPIATE ANALGESIC USE: ICD-10-CM

## 2025-05-22 DIAGNOSIS — M35.06 SJOGREN'S SYNDROME WITH PERIPHERAL NERVOUS SYSTEM INVOLVEMENT: ICD-10-CM

## 2025-05-22 DIAGNOSIS — M79.10 MYALGIA: ICD-10-CM

## 2025-05-22 PROCEDURE — 3079F DIAST BP 80-89 MM HG: CPT | Performed by: INTERNAL MEDICINE

## 2025-05-22 PROCEDURE — 99213 OFFICE O/P EST LOW 20 MIN: CPT | Performed by: INTERNAL MEDICINE

## 2025-05-22 PROCEDURE — 3074F SYST BP LT 130 MM HG: CPT | Performed by: INTERNAL MEDICINE

## 2025-05-22 PROCEDURE — 1036F TOBACCO NON-USER: CPT | Performed by: INTERNAL MEDICINE

## 2025-05-22 PROCEDURE — G2211 COMPLEX E/M VISIT ADD ON: HCPCS | Performed by: INTERNAL MEDICINE

## 2025-05-22 RX ORDER — OXYCODONE HYDROCHLORIDE 10 MG/1
10 TABLET ORAL EVERY 6 HOURS PRN
Qty: 115 TABLET | Refills: 0 | Status: SHIPPED | OUTPATIENT
Start: 2025-05-22

## 2025-05-22 ASSESSMENT — ENCOUNTER SYMPTOMS
CONSTIPATION: 1
MYALGIAS: 1
JOINT SWELLING: 1
OCCASIONAL FEELINGS OF UNSTEADINESS: 0
BACK PAIN: 1
LOSS OF SENSATION IN FEET: 0
DEPRESSION: 0
DIARRHEA: 1
FATIGUE: 1
NECK STIFFNESS: 1
ARTHRALGIAS: 1
NECK PAIN: 1

## 2025-05-22 ASSESSMENT — PATIENT HEALTH QUESTIONNAIRE - PHQ9
2. FEELING DOWN, DEPRESSED OR HOPELESS: NOT AT ALL
SUM OF ALL RESPONSES TO PHQ9 QUESTIONS 1 AND 2: 0
1. LITTLE INTEREST OR PLEASURE IN DOING THINGS: NOT AT ALL

## 2025-05-22 NOTE — PROGRESS NOTES
"Subjective   Patient ID: Luis Condon Jr. is a 44 y.o. male who presents for Follow-up.  HPI  Patient with Sjogren's with positive STEPHANY and SSB with sicca symptoms, elevated CPK with normal muscle biopsy, episcleritis, possible small fiber neuropathy on skin biopsy.  Previous medications have included azathioprine, CellCept, methotrexate, Cytoxan, IVIG, Rituxan, high-dose steroids.    Patient was last seen in February and at that last visit he was still recovering from his recent sural nerve biopsy of the left lower extremity.  He still has a lot of chronic pain but not the severe pain he had initially.  With conversations with his neurologist he has been restarted on IVIG And has done the 4 days of loading dose.    He has been having a lot of headaches    He has chronic numbness in his right hand as well.  He was told not to move forward with carpal tunnel surgery but it significantly affects his life that he has difficulty doing things such as shaving.    Is having a lot of dry eye and feels like there is \"rocío in his eyes\".  Previously has been on Restasis but has not seen his ophthalmologist in some time    Nerve biopsy and muscle biopsies were negative.  He did have neuromuscular ultrasound since he was still having pain in the sural nerve site and it was thought that it was consistent with postbiopsy.    He has been thought he he might have multifocal hypertrophic neuropathy.  He has done extensive genetic testing which was negative or unhelpful.  Review of Systems   Constitutional:  Positive for fatigue.   Eyes:         Dry eye   Gastrointestinal:  Positive for constipation and diarrhea.   Musculoskeletal:  Positive for arthralgias, back pain, joint swelling, myalgias, neck pain and neck stiffness.   Psychiatric/Behavioral:          Depression       Objective   Physical Exam  GEN: NAD A&O x3 appropriate affect   EYES:  Wearing a brace on his right wrist   No current swelling in the hands elbows "   Assessment/Plan     Patient with +STEPHANY +SSB with sicca symptoms elevated cpks with normal muscle biopsy, episcleritis.   -Continues to work with neurology.  Had negative nerve and muscle biopsy.  Is being retried on IVIG.  Thought to have chronic inflammatory demyelinating polyneuropathy    Sjogren's.- Has been on azathioprine, CellCept, methotrexate, Cytoxan, IVIG, Rituxan without any help in his symptoms. He had high dose steroids and had side effects to this       Has chronic pain.  Previously has seen pain management, Dr. Keagan Fan.  I have taken over his oxycodone prescription.  He is on combination of gabapentin, alprazolam, muscle relaxants and I am hesitant about increasing his medications further.  He also has had GI slowing which increasing narcotics could contribute more to.          In 3 months or as needed.     OARRS:    I have personally reviewed the OARRS report for Luis Condon Jr.. I have considered the risks of abuse, dependence, addiction and diversion    Is the patient prescribed a combination of a benzodiazepine and opioid?  Yes, I feel it is clincially indicated to continue the medication and have discussed with the patient risks/benefits/alternatives.    Last Urine Drug Screen / ordered today: Yes  No results found for this or any previous visit (from the past 8760 hours).    Results are as expected.         Controlled Substance Agreement:  Date of the Last Agreement: 2/21/2025  Reviewed Controlled Substance Agreement including but not limited to the benefits, risks, and alternatives to treatment with a Controlled Substance medication(s).    Opioids:  What is the patient's goal of therapy?help with pain  Is this being achieved with current treatment? some    I have calculated the patient's Morphine Dose Equivalent (MED):     I feel that it is clinically indicated to continue this current medication regimen after consideration of alternative therapies, and other non-opioid  treatment.    Opioid Risk Screening:  No data recorded    Pain Assessment:  No data recorded  Christina Ely MD 05/22/25 9:27 AM

## 2025-05-29 ENCOUNTER — APPOINTMENT (OUTPATIENT)
Dept: ALLERGY | Facility: CLINIC | Age: 45
End: 2025-05-29
Payer: MEDICARE

## 2025-05-29 DIAGNOSIS — J30.89 ALLERGIC RHINITIS DUE TO OTHER ALLERGIC TRIGGER, UNSPECIFIED SEASONALITY: ICD-10-CM

## 2025-05-29 PROCEDURE — 95117 IMMUNOTHERAPY INJECTIONS: CPT | Performed by: ALLERGY & IMMUNOLOGY

## 2025-05-30 ENCOUNTER — INFUSION (OUTPATIENT)
Dept: INFUSION THERAPY | Facility: HOSPITAL | Age: 45
End: 2025-05-30
Payer: MEDICARE

## 2025-05-30 VITALS
OXYGEN SATURATION: 97 % | SYSTOLIC BLOOD PRESSURE: 136 MMHG | TEMPERATURE: 98.2 F | RESPIRATION RATE: 18 BRPM | DIASTOLIC BLOOD PRESSURE: 85 MMHG | HEART RATE: 64 BPM | BODY MASS INDEX: 33.47 KG/M2 | WEIGHT: 240 LBS

## 2025-05-30 DIAGNOSIS — G62.9 SMALL FIBER NEUROPATHY: ICD-10-CM

## 2025-05-30 DIAGNOSIS — M35.01 SJOGREN'S SYNDROME WITH KERATOCONJUNCTIVITIS SICCA: ICD-10-CM

## 2025-05-30 PROCEDURE — 96365 THER/PROPH/DIAG IV INF INIT: CPT | Mod: INF

## 2025-05-30 PROCEDURE — 2500000004 HC RX 250 GENERAL PHARMACY W/ HCPCS (ALT 636 FOR OP/ED): Mod: JZ | Performed by: PSYCHIATRY & NEUROLOGY

## 2025-05-30 PROCEDURE — 96366 THER/PROPH/DIAG IV INF ADDON: CPT | Mod: INF

## 2025-05-30 PROCEDURE — 2500000001 HC RX 250 WO HCPCS SELF ADMINISTERED DRUGS (ALT 637 FOR MEDICARE OP): Performed by: PSYCHIATRY & NEUROLOGY

## 2025-05-30 RX ORDER — DIPHENHYDRAMINE HCL 25 MG
25 CAPSULE ORAL ONCE
OUTPATIENT
Start: 2025-06-20

## 2025-05-30 RX ORDER — ACETAMINOPHEN 325 MG/1
650 TABLET ORAL ONCE
Status: COMPLETED | OUTPATIENT
Start: 2025-05-30 | End: 2025-05-30

## 2025-05-30 RX ORDER — ALBUTEROL SULFATE 0.83 MG/ML
3 SOLUTION RESPIRATORY (INHALATION) AS NEEDED
OUTPATIENT
Start: 2025-06-20

## 2025-05-30 RX ORDER — ACETAMINOPHEN 325 MG/1
650 TABLET ORAL ONCE
OUTPATIENT
Start: 2025-06-20

## 2025-05-30 RX ORDER — EPINEPHRINE 0.3 MG/.3ML
0.3 INJECTION SUBCUTANEOUS EVERY 5 MIN PRN
OUTPATIENT
Start: 2025-06-20

## 2025-05-30 RX ORDER — DIPHENHYDRAMINE HYDROCHLORIDE 50 MG/ML
50 INJECTION, SOLUTION INTRAMUSCULAR; INTRAVENOUS AS NEEDED
OUTPATIENT
Start: 2025-06-20

## 2025-05-30 RX ORDER — DIPHENHYDRAMINE HCL 25 MG
25 CAPSULE ORAL ONCE
Status: COMPLETED | OUTPATIENT
Start: 2025-05-30 | End: 2025-05-30

## 2025-05-30 RX ORDER — FAMOTIDINE 10 MG/ML
20 INJECTION, SOLUTION INTRAVENOUS ONCE AS NEEDED
OUTPATIENT
Start: 2025-06-20

## 2025-05-30 RX ADMIN — DIPHENHYDRAMINE HYDROCHLORIDE 25 MG: 25 CAPSULE ORAL at 08:20

## 2025-05-30 RX ADMIN — IMMUNE GLOBULIN INFUSION (HUMAN) 75 G: 100 INJECTION, SOLUTION INTRAVENOUS; SUBCUTANEOUS at 09:07

## 2025-05-30 RX ADMIN — HYDROCORTISONE SODIUM SUCCINATE 100 MG: 100 INJECTION, POWDER, FOR SOLUTION INTRAMUSCULAR; INTRAVENOUS at 08:20

## 2025-05-30 RX ADMIN — ACETAMINOPHEN 650 MG: 325 TABLET ORAL at 08:20

## 2025-06-02 DIAGNOSIS — I10 ESSENTIAL HYPERTENSION: ICD-10-CM

## 2025-06-02 DIAGNOSIS — R73.03 PREDIABETES: ICD-10-CM

## 2025-06-02 DIAGNOSIS — E78.5 HYPERLIPIDEMIA, UNSPECIFIED HYPERLIPIDEMIA TYPE: ICD-10-CM

## 2025-06-10 ENCOUNTER — APPOINTMENT (OUTPATIENT)
Dept: PRIMARY CARE | Facility: CLINIC | Age: 45
End: 2025-06-10
Payer: MEDICARE

## 2025-06-13 DIAGNOSIS — E78.5 HYPERLIPIDEMIA, UNSPECIFIED HYPERLIPIDEMIA TYPE: ICD-10-CM

## 2025-06-13 DIAGNOSIS — G62.9 POLYNEUROPATHY, UNSPECIFIED: ICD-10-CM

## 2025-06-13 RX ORDER — GABAPENTIN 100 MG/1
200 CAPSULE ORAL 3 TIMES DAILY
Qty: 540 CAPSULE | Refills: 3 | Status: SHIPPED | OUTPATIENT
Start: 2025-06-13

## 2025-06-16 ENCOUNTER — DOCUMENTATION (OUTPATIENT)
Dept: NEUROLOGY | Facility: HOSPITAL | Age: 45
End: 2025-06-16
Payer: MEDICARE

## 2025-06-16 DIAGNOSIS — G62.9 SMALL FIBER NEUROPATHY: ICD-10-CM

## 2025-06-16 DIAGNOSIS — M35.01 SJOGREN'S SYNDROME WITH KERATOCONJUNCTIVITIS SICCA: Primary | ICD-10-CM

## 2025-06-16 RX ORDER — EPINEPHRINE 0.3 MG/.3ML
0.3 INJECTION SUBCUTANEOUS EVERY 5 MIN PRN
OUTPATIENT
Start: 2025-06-20

## 2025-06-16 RX ORDER — ALBUTEROL SULFATE 0.83 MG/ML
3 SOLUTION RESPIRATORY (INHALATION) AS NEEDED
OUTPATIENT
Start: 2025-06-20

## 2025-06-16 RX ORDER — DIPHENHYDRAMINE HCL 25 MG
25 TABLET ORAL ONCE
OUTPATIENT
Start: 2025-06-20

## 2025-06-16 RX ORDER — FAMOTIDINE 10 MG/ML
20 INJECTION, SOLUTION INTRAVENOUS ONCE AS NEEDED
OUTPATIENT
Start: 2025-06-20

## 2025-06-16 RX ORDER — ACETAMINOPHEN 325 MG/1
650 TABLET ORAL ONCE
OUTPATIENT
Start: 2025-06-20

## 2025-06-16 RX ORDER — DIPHENHYDRAMINE HYDROCHLORIDE 50 MG/ML
50 INJECTION, SOLUTION INTRAMUSCULAR; INTRAVENOUS AS NEEDED
OUTPATIENT
Start: 2025-06-20

## 2025-06-16 NOTE — PROGRESS NOTES
I spoke with patient last week. He has had his 4-day induction of IVIG, followed 3 weeks later by 1 day of IVIG. He developed a headache after the 3rd or 4th dose of IVIG, and his blood pressure was slightly elevated. He had a headache with his second course of treatment as well, which tapered off quickly.     He does not feel improvement with the IVIG, in terms of his pain and paresthesias. He also feels more fatigued than usual. Has joint pain in his hands, tingling in his arms and legs, hypersensitivity of the skin, and the nerve biopsy site continues to bother him, though somewhat improved. If he walks just a small amount, he feels calf pain around the biopsy site which radiates to the small toe, and his heel is almost completely numb. After a few minutes of walking, his calf tends to swell up, and feel very uncomfortable.     We discussed the following:    He is not getting the full recommended dose of IVIG at this time, since he did not want to spend more days in the infusion center, and does not want the infusions to be done at home. He discussed with his wife going up to the full dose of maintenance IVIG, which will likely need to be done over 2 days rather than 1, at the infusion center, and agrees to this. I will arrange for this.  May try a lidocaine patch over the biopsy site to see if this helps.  3.    Agreed to increase his Gabapentin to 2000 mg tid (from 1900 tid). I thought at one time he had been on 2100 mg tid but he thinks he had calculated his dosing incorrectly). No side effects to the Gabapentin at this point.  4.     May confer with headache specialist if headache becomes bothersome with IVIG, though he is taking oxycodone for general pain relief prn, and I will question him further if this was tried tor headache relief.

## 2025-06-18 ENCOUNTER — APPOINTMENT (OUTPATIENT)
Dept: PRIMARY CARE | Facility: CLINIC | Age: 45
End: 2025-06-18
Payer: MEDICARE

## 2025-06-18 VITALS
BODY MASS INDEX: 33.45 KG/M2 | OXYGEN SATURATION: 96 % | SYSTOLIC BLOOD PRESSURE: 128 MMHG | HEART RATE: 63 BPM | TEMPERATURE: 97.7 F | DIASTOLIC BLOOD PRESSURE: 82 MMHG | WEIGHT: 239.8 LBS

## 2025-06-18 DIAGNOSIS — R73.03 PREDIABETES: ICD-10-CM

## 2025-06-18 DIAGNOSIS — I10 ESSENTIAL HYPERTENSION: ICD-10-CM

## 2025-06-18 DIAGNOSIS — E78.5 HYPERLIPIDEMIA, UNSPECIFIED HYPERLIPIDEMIA TYPE: ICD-10-CM

## 2025-06-18 DIAGNOSIS — E66.811 OBESITY (BMI 30.0-34.9): Primary | ICD-10-CM

## 2025-06-18 DIAGNOSIS — K59.09 CHRONIC CONSTIPATION: ICD-10-CM

## 2025-06-18 DIAGNOSIS — K21.9 GASTROESOPHAGEAL REFLUX DISEASE WITHOUT ESOPHAGITIS: ICD-10-CM

## 2025-06-18 DIAGNOSIS — I27.20 PULMONARY HYPERTENSION (MULTI): ICD-10-CM

## 2025-06-18 DIAGNOSIS — G47.33 OSA (OBSTRUCTIVE SLEEP APNEA): ICD-10-CM

## 2025-06-18 DIAGNOSIS — F32.2 SEVERE MAJOR DEPRESSION WITHOUT PSYCHOTIC FEATURES (MULTI): ICD-10-CM

## 2025-06-18 PROBLEM — E66.01 MORBID (SEVERE) OBESITY DUE TO EXCESS CALORIES (MULTI): Status: RESOLVED | Noted: 2024-09-04 | Resolved: 2025-06-18

## 2025-06-18 PROCEDURE — 3079F DIAST BP 80-89 MM HG: CPT | Performed by: FAMILY MEDICINE

## 2025-06-18 PROCEDURE — 3074F SYST BP LT 130 MM HG: CPT | Performed by: FAMILY MEDICINE

## 2025-06-18 PROCEDURE — G2211 COMPLEX E/M VISIT ADD ON: HCPCS | Performed by: FAMILY MEDICINE

## 2025-06-18 PROCEDURE — 1036F TOBACCO NON-USER: CPT | Performed by: FAMILY MEDICINE

## 2025-06-18 PROCEDURE — 99214 OFFICE O/P EST MOD 30 MIN: CPT | Performed by: FAMILY MEDICINE

## 2025-06-18 RX ORDER — LISINOPRIL 30 MG/1
30 TABLET ORAL DAILY
Qty: 90 TABLET | Refills: 0 | Status: SHIPPED | OUTPATIENT
Start: 2025-06-18 | End: 2025-09-16

## 2025-06-18 RX ORDER — OMEPRAZOLE 40 MG/1
40 CAPSULE, DELAYED RELEASE ORAL
Qty: 90 CAPSULE | Refills: 1 | Status: SHIPPED | OUTPATIENT
Start: 2025-06-18 | End: 2025-12-15

## 2025-06-18 RX ORDER — FENOFIBRATE 145 MG/1
145 TABLET, FILM COATED ORAL DAILY
Qty: 90 TABLET | Refills: 0 | Status: SHIPPED | OUTPATIENT
Start: 2025-06-18 | End: 2025-09-16

## 2025-06-18 ASSESSMENT — ENCOUNTER SYMPTOMS
DIZZINESS: 0
BLOOD IN STOOL: 0
POLYDIPSIA: 0
VOMITING: 0
NAUSEA: 0
CONSTIPATION: 1
DYSURIA: 0
HEADACHES: 0
SLEEP DISTURBANCE: 0
DIARRHEA: 0
ARTHRALGIAS: 1
MYALGIAS: 0
DYSPHORIC MOOD: 1
FATIGUE: 1
SHORTNESS OF BREATH: 1
POLYPHAGIA: 0
DIFFICULTY URINATING: 0
PALPITATIONS: 0
ABDOMINAL PAIN: 1

## 2025-06-18 ASSESSMENT — PATIENT HEALTH QUESTIONNAIRE - PHQ9
1. LITTLE INTEREST OR PLEASURE IN DOING THINGS: NOT AT ALL
SUM OF ALL RESPONSES TO PHQ9 QUESTIONS 1 AND 2: 1
2. FEELING DOWN, DEPRESSED OR HOPELESS: SEVERAL DAYS

## 2025-06-18 NOTE — PATIENT INSTRUCTIONS
Recommend a predominant low fat whole foods plant based diet.  Cut back on meat, dairy, processed carbs, salt and oils(especially palm and coconut). Increase fiber in your diet.  Decrease alcohol as much as possible if you drink. Recommend regular exercise most days of the week(goal up to 150min per week). Also recommend good sleep habits aiming for 7-8 hours per night.     Obtain your blood work    Start tirzepatide    Start using cpap machine asap    Follow up with your specialists as scheduled    Please bring in copies of your advance directives/living will to your next appointment    Return in 3 months, sooner if needed

## 2025-06-18 NOTE — PROGRESS NOTES
Subjective   Patient ID: Luis Condon Jr. is a 44 y.o. male who presents for Hyperlipidemia (recheck), Prediabetes, and Hypertension and multiple issues. Did not have bw    HPI   HTN: controlled  Lipids/predm: due for recheck  BMI: high. Feeling more uncomfortable w/ wt gain. Has harder time breathing, joints hurt more w/ wt gain. Wants to try GLP meds. Diets have not helped w/ wt loss. Unable to exercise due to chronic pain.   GERD: stable.   MARY JO: still has not rec'd machine. Has mild MARY JO based on last sleep study .    Review of Systems   Constitutional:  Positive for fatigue (chronic).   HENT: Negative.     Eyes:  Negative for visual disturbance.   Respiratory:  Positive for shortness of breath (chronic).    Cardiovascular:  Negative for chest pain and palpitations.   Gastrointestinal:  Positive for abdominal pain (chronic) and constipation. Negative for blood in stool, diarrhea, nausea and vomiting.   Endocrine: Negative for polydipsia, polyphagia and polyuria.   Genitourinary:  Negative for difficulty urinating and dysuria.   Musculoskeletal:  Positive for arthralgias (diffuse and chronic). Negative for myalgias.   Skin:  Negative for rash.   Neurological:  Negative for dizziness and headaches.   Psychiatric/Behavioral:  Positive for dysphoric mood. Negative for sleep disturbance.        Objective   /82   Pulse 63   Temp 36.5 °C (97.7 °F)   Wt 109 kg (239 lb 12.8 oz)   SpO2 96%   BMI 33.45 kg/m²     Physical Exam  Vitals and nursing note reviewed.   Constitutional:       General: He is not in acute distress.     Appearance: Normal appearance. He is not toxic-appearing.   HENT:      Head: Normocephalic.      Mouth/Throat:      Pharynx: Oropharynx is clear.   Eyes:      General: No scleral icterus.     Pupils: Pupils are equal, round, and reactive to light.   Neck:      Vascular: No carotid bruit.   Cardiovascular:      Rate and Rhythm: Normal rate and regular rhythm.      Heart sounds: No murmur  heard.  Pulmonary:      Effort: Pulmonary effort is normal. No respiratory distress.      Breath sounds: Normal breath sounds.   Abdominal:      Palpations: Abdomen is soft.      Tenderness: There is no abdominal tenderness. There is no guarding.   Musculoskeletal:         General: No tenderness.      Right lower leg: No edema.      Left lower leg: No edema.   Skin:     General: Skin is warm.   Neurological:      General: No focal deficit present.      Mental Status: He is alert.      Cranial Nerves: No cranial nerve deficit.   Psychiatric:         Mood and Affect: Mood normal.         Assessment/Plan   Problem List Items Addressed This Visit           ICD-10-CM    Pulmonary hypertension (Multi) I27.20    Chronic constipation K59.09    Hyperlipidemia E78.5    Relevant Medications    fenofibrate (Tricor) 145 mg tablet    tirzepatide, weight loss, (Zepbound) 2.5 mg/0.5 mL injection    Essential hypertension I10    Relevant Medications    lisinopril 30 mg tablet    tirzepatide, weight loss, (Zepbound) 2.5 mg/0.5 mL injection    Severe major depression without psychotic features (Multi) F32.2    Prediabetes R73.03    Relevant Medications    lisinopril 30 mg tablet    tirzepatide, weight loss, (Zepbound) 2.5 mg/0.5 mL injection    GERD (gastroesophageal reflux disease) K21.9    Relevant Medications    omeprazole (PriLOSEC) 40 mg DR capsule     Other Visit Diagnoses         Codes      Obesity (BMI 30.0-34.9)    -  Primary E66.811    Relevant Medications    tirzepatide, weight loss, (Zepbound) 2.5 mg/0.5 mL injection      MARY JO (obstructive sleep apnea)     G47.33    Relevant Medications    tirzepatide, weight loss, (Zepbound) 2.5 mg/0.5 mL injection    Other Relevant Orders    Positive Airway Pressure (PAP) Therapy        Reviewed prior blood work including CMP, lipids, A1c.  Reviewed prior sleep study.  Discussed side effect of new medication.  Refilled meds.  Recommendations given

## 2025-06-19 DIAGNOSIS — G62.9 SMALL FIBER NEUROPATHY: ICD-10-CM

## 2025-06-19 DIAGNOSIS — M35.01 SJOGREN'S SYNDROME WITH KERATOCONJUNCTIVITIS SICCA: Primary | ICD-10-CM

## 2025-06-20 ENCOUNTER — INFUSION (OUTPATIENT)
Dept: INFUSION THERAPY | Facility: HOSPITAL | Age: 45
End: 2025-06-20
Payer: MEDICARE

## 2025-06-20 VITALS
RESPIRATION RATE: 16 BRPM | WEIGHT: 240 LBS | OXYGEN SATURATION: 98 % | SYSTOLIC BLOOD PRESSURE: 118 MMHG | HEART RATE: 57 BPM | BODY MASS INDEX: 33.47 KG/M2 | TEMPERATURE: 97.9 F | DIASTOLIC BLOOD PRESSURE: 71 MMHG

## 2025-06-20 DIAGNOSIS — M35.06 SJOGREN'S SYNDROME WITH PERIPHERAL NERVOUS SYSTEM INVOLVEMENT: ICD-10-CM

## 2025-06-20 DIAGNOSIS — G62.9 SMALL FIBER NEUROPATHY: ICD-10-CM

## 2025-06-20 DIAGNOSIS — M35.01 SJOGREN'S SYNDROME WITH KERATOCONJUNCTIVITIS SICCA: ICD-10-CM

## 2025-06-20 PROCEDURE — 96365 THER/PROPH/DIAG IV INF INIT: CPT | Mod: INF

## 2025-06-20 PROCEDURE — 2500000004 HC RX 250 GENERAL PHARMACY W/ HCPCS (ALT 636 FOR OP/ED): Mod: JW | Performed by: PSYCHIATRY & NEUROLOGY

## 2025-06-20 PROCEDURE — 2500000001 HC RX 250 WO HCPCS SELF ADMINISTERED DRUGS (ALT 637 FOR MEDICARE OP): Performed by: PSYCHIATRY & NEUROLOGY

## 2025-06-20 PROCEDURE — 96375 TX/PRO/DX INJ NEW DRUG ADDON: CPT | Mod: INF

## 2025-06-20 PROCEDURE — 96366 THER/PROPH/DIAG IV INF ADDON: CPT | Mod: INF

## 2025-06-20 RX ORDER — DIPHENHYDRAMINE HCL 25 MG
25 CAPSULE ORAL ONCE
OUTPATIENT
Start: 2025-07-11

## 2025-06-20 RX ORDER — DIPHENHYDRAMINE HYDROCHLORIDE 50 MG/ML
50 INJECTION, SOLUTION INTRAMUSCULAR; INTRAVENOUS AS NEEDED
OUTPATIENT
Start: 2025-07-11

## 2025-06-20 RX ORDER — EPINEPHRINE 0.3 MG/.3ML
0.3 INJECTION SUBCUTANEOUS EVERY 5 MIN PRN
OUTPATIENT
Start: 2025-07-11

## 2025-06-20 RX ORDER — ACETAMINOPHEN 325 MG/1
650 TABLET ORAL ONCE
OUTPATIENT
Start: 2025-07-11

## 2025-06-20 RX ORDER — ACETAMINOPHEN 325 MG/1
650 TABLET ORAL ONCE
Status: COMPLETED | OUTPATIENT
Start: 2025-06-20 | End: 2025-06-20

## 2025-06-20 RX ORDER — FAMOTIDINE 10 MG/ML
20 INJECTION, SOLUTION INTRAVENOUS ONCE AS NEEDED
OUTPATIENT
Start: 2025-07-11

## 2025-06-20 RX ORDER — ALBUTEROL SULFATE 0.83 MG/ML
3 SOLUTION RESPIRATORY (INHALATION) AS NEEDED
OUTPATIENT
Start: 2025-07-11

## 2025-06-20 RX ORDER — DIPHENHYDRAMINE HCL 25 MG
25 CAPSULE ORAL ONCE
Status: COMPLETED | OUTPATIENT
Start: 2025-06-20 | End: 2025-06-20

## 2025-06-20 RX ADMIN — DIPHENHYDRAMINE HYDROCHLORIDE 25 MG: 25 CAPSULE ORAL at 08:43

## 2025-06-20 RX ADMIN — IMMUNE GLOBULIN INFUSION (HUMAN) 75 G: 100 INJECTION, SOLUTION INTRAVENOUS; SUBCUTANEOUS at 09:29

## 2025-06-20 RX ADMIN — ACETAMINOPHEN 650 MG: 325 TABLET ORAL at 08:43

## 2025-06-20 RX ADMIN — HYDROCORTISONE SODIUM SUCCINATE 100 MG: 100 INJECTION, POWDER, FOR SOLUTION INTRAMUSCULAR; INTRAVENOUS at 08:44

## 2025-06-20 ASSESSMENT — PAIN SCALES - GENERAL: PAINLEVEL_OUTOF10: 7

## 2025-06-21 RX ORDER — OXYCODONE HYDROCHLORIDE 10 MG/1
10 TABLET ORAL EVERY 6 HOURS PRN
Qty: 115 TABLET | Refills: 0 | Status: SHIPPED | OUTPATIENT
Start: 2025-06-21

## 2025-06-25 RX ORDER — FENOFIBRATE 145 MG/1
145 TABLET, FILM COATED ORAL DAILY
Qty: 90 TABLET | Refills: 0 | OUTPATIENT
Start: 2025-06-25 | End: 2025-09-23

## 2025-06-26 ENCOUNTER — APPOINTMENT (OUTPATIENT)
Dept: ALLERGY | Facility: CLINIC | Age: 45
End: 2025-06-26
Payer: MEDICARE

## 2025-06-26 VITALS — DIASTOLIC BLOOD PRESSURE: 98 MMHG | SYSTOLIC BLOOD PRESSURE: 157 MMHG | WEIGHT: 238.4 LBS | BODY MASS INDEX: 33.25 KG/M2

## 2025-06-26 DIAGNOSIS — H10.45 CHRONIC ALLERGIC CONJUNCTIVITIS: ICD-10-CM

## 2025-06-26 DIAGNOSIS — J30.89 ALLERGIC RHINITIS DUE TO OTHER ALLERGIC TRIGGER, UNSPECIFIED SEASONALITY: Primary | ICD-10-CM

## 2025-06-26 DIAGNOSIS — J30.81 ALLERGIC RHINITIS DUE TO ANIMAL (CAT) (DOG) HAIR AND DANDER: ICD-10-CM

## 2025-06-26 DIAGNOSIS — J30.1 ALLERGIC REACTION TO INHALED POLLEN: ICD-10-CM

## 2025-06-26 LAB
ALBUMIN SERPL-MCNC: 4.2 G/DL (ref 3.6–5.1)
ALP SERPL-CCNC: 49 U/L (ref 36–130)
ALT SERPL-CCNC: 27 U/L (ref 9–46)
ANION GAP SERPL CALCULATED.4IONS-SCNC: 5 MMOL/L (CALC) (ref 7–17)
AST SERPL-CCNC: 40 U/L (ref 10–40)
BILIRUB SERPL-MCNC: 0.4 MG/DL (ref 0.2–1.2)
BUN SERPL-MCNC: 9 MG/DL (ref 7–25)
CALCIUM SERPL-MCNC: 9.3 MG/DL (ref 8.6–10.3)
CHLORIDE SERPL-SCNC: 103 MMOL/L (ref 98–110)
CHOLEST SERPL-MCNC: 275 MG/DL
CHOLEST/HDLC SERPL: 7.9 (CALC)
CO2 SERPL-SCNC: 29 MMOL/L (ref 20–32)
CREAT SERPL-MCNC: 1.18 MG/DL (ref 0.6–1.29)
EGFRCR SERPLBLD CKD-EPI 2021: 79 ML/MIN/1.73M2
EST. AVERAGE GLUCOSE BLD GHB EST-MCNC: 120 MG/DL
EST. AVERAGE GLUCOSE BLD GHB EST-SCNC: 6.6 MMOL/L
GLUCOSE SERPL-MCNC: 107 MG/DL (ref 65–99)
HBA1C MFR BLD: 5.8 %
HDLC SERPL-MCNC: 35 MG/DL
LDLC SERPL CALC-MCNC: 184 MG/DL (CALC)
NONHDLC SERPL-MCNC: 240 MG/DL (CALC)
POTASSIUM SERPL-SCNC: 4.7 MMOL/L (ref 3.5–5.3)
PROT SERPL-MCNC: 8.4 G/DL (ref 6.1–8.1)
SODIUM SERPL-SCNC: 137 MMOL/L (ref 135–146)
TRIGL SERPL-MCNC: 330 MG/DL

## 2025-06-26 PROCEDURE — 3080F DIAST BP >= 90 MM HG: CPT | Performed by: ALLERGY & IMMUNOLOGY

## 2025-06-26 PROCEDURE — 99215 OFFICE O/P EST HI 40 MIN: CPT | Performed by: ALLERGY & IMMUNOLOGY

## 2025-06-26 PROCEDURE — 95117 IMMUNOTHERAPY INJECTIONS: CPT | Performed by: ALLERGY & IMMUNOLOGY

## 2025-06-26 PROCEDURE — 95004 PERQ TESTS W/ALRGNC XTRCS: CPT | Performed by: ALLERGY & IMMUNOLOGY

## 2025-06-26 PROCEDURE — 3077F SYST BP >= 140 MM HG: CPT | Performed by: ALLERGY & IMMUNOLOGY

## 2025-06-26 NOTE — PROGRESS NOTES
Subjective   Patient ID:   [id number]   Luis Condon Jr. is a 44 y.o. male who presents for Allergy Testing (Pt here for  retest. ).    Chief Complaint   Patient presents with   • Allergy Testing     Pt here for  retest.           HPI  This patient is here to evaluate for:  Allergic rhinitis and conjunctivitis     Immunotherapy started: 7/21/22  This patient is taking allergy shots receiving (ml): 0.5  every: month  Last shot: today 6/26/25  Peak flows: n/a  Location: [place]   Symptoms on the allergy shots have improved.  Medications used are per the chart.  He is better than normally is.   itchy eyes, stuffy.   he is on zyrtec.      This patient is not on a beta blocker.  There have been no systemic or delayed allergic reactions to the allergy immunotherapy. The size of the shot reactions are small. They are small      sh: not around pets currently     Pmhx   Hyperlipidemia (recheck), Prediabetes, and Hypertension,weight gain on GLP med  I reviewed Neurology note 6/16/25  4-day induction of IVIG, followed 3 weeks later by 1 day of IVIG.   But Persistent joint pain in his hands, tingling in his arms and legs, hypersensitivity of the skin   +follows with Rheum for +STEPHANY +SSB with sicca symptoms elevated cpks with normal muscle biopsy, episcleritis.  Has been on azathioprine, CellCept, methotrexate, Cytoxan, IVIG, Rituxan without any help in his symptoms   chronic pain on oxycodone      Review of Systems   All other systems reviewed and are negative.        Objective     BP (!) 157/98 (BP Location: Left arm, Patient Position: Sitting, BP Cuff Size: Adult)   Wt 108 kg (238 lb 6.4 oz)   BMI 33.25 kg/m²      Physical Exam  Constitutional:       General: He is not in acute distress.     Appearance: Normal appearance. He is not ill-appearing.   HENT:      Head: Normocephalic and atraumatic.      Right Ear: Tympanic membrane, ear canal and external ear normal.      Left Ear: Tympanic membrane, ear canal and external ear  normal.      Nose: Nose normal. No congestion or rhinorrhea.      Mouth/Throat:      Mouth: Mucous membranes are moist.      Pharynx: Oropharynx is clear. No oropharyngeal exudate or posterior oropharyngeal erythema.   Eyes:      General:         Right eye: No discharge.         Left eye: No discharge.      Conjunctiva/sclera: Conjunctivae normal.   Cardiovascular:      Rate and Rhythm: Normal rate and regular rhythm.      Heart sounds: Normal heart sounds. No murmur heard.     No friction rub. No gallop.   Pulmonary:      Effort: Pulmonary effort is normal. No respiratory distress.      Breath sounds: Normal breath sounds. No stridor. No wheezing, rhonchi or rales.   Chest:      Chest wall: No tenderness.   Abdominal:      General: Abdomen is flat.      Palpations: Abdomen is soft.   Musculoskeletal:         General: Normal range of motion.      Cervical back: Normal range of motion and neck supple.   Lymphadenopathy:      Cervical: No cervical adenopathy.   Skin:     General: Skin is warm and dry.      Findings: No erythema, lesion or rash.   Neurological:      General: No focal deficit present.      Mental Status: He is alert. Mental status is at baseline.   Psychiatric:         Mood and Affect: Mood normal.         Behavior: Behavior normal.         Thought Content: Thought content normal.         Judgment: Judgment normal.          Current Medications[1]    Summary of the labs over the past 6 months:    Orders Only on 06/02/2025   Component Date Value Ref Range Status   • HEMOGLOBIN A1c 06/25/2025 5.8 (H)  <5.7 % Final   • eAG (mg/dL) 06/25/2025 120  mg/dL Final   • eAG (mmol/L) 06/25/2025 6.6  mmol/L Final   • GLUCOSE 06/25/2025 107 (H)  65 - 99 mg/dL Final   • UREA NITROGEN (BUN) 06/25/2025 9  7 - 25 mg/dL Final   • CREATININE 06/25/2025 1.18  0.60 - 1.29 mg/dL Final   • EGFR 06/25/2025 79  > OR = 60 mL/min/1.73m2 Final   • SODIUM 06/25/2025 137  135 - 146 mmol/L Final   • POTASSIUM 06/25/2025 4.7  3.5 - 5.3  mmol/L Final   • CHLORIDE 06/25/2025 103  98 - 110 mmol/L Final   • CARBON DIOXIDE 06/25/2025 29  20 - 32 mmol/L Final   • ELECTROLYTE BALANCE 06/25/2025 5 (L)  7 - 17 mmol/L (calc) Final   • CALCIUM 06/25/2025 9.3  8.6 - 10.3 mg/dL Final   • PROTEIN, TOTAL 06/25/2025 8.4 (H)  6.1 - 8.1 g/dL Final   • ALBUMIN 06/25/2025 4.2  3.6 - 5.1 g/dL Final   • BILIRUBIN, TOTAL 06/25/2025 0.4  0.2 - 1.2 mg/dL Final   • ALKALINE PHOSPHATASE 06/25/2025 49  36 - 130 U/L Final   • AST 06/25/2025 40  10 - 40 U/L Final   • ALT 06/25/2025 27  9 - 46 U/L Final   • CHOLESTEROL, TOTAL 06/25/2025 275 (H)  <200 mg/dL Final   • HDL CHOLESTEROL 06/25/2025 35 (L)  > OR = 40 mg/dL Final   • TRIGLYCERIDES 06/25/2025 330 (H)  <150 mg/dL Final   • LDL-CHOLESTEROL 06/25/2025 184 (H)  mg/dL (calc) Final   • CHOL/HDLC RATIO 06/25/2025 7.9 (H)  <5.0 (calc) Final   • NON HDL CHOLESTEROL 06/25/2025 240 (H)  <130 mg/dL (calc) Final   Orders Only on 03/13/2025   Component Date Value Ref Range Status   • CHOLESTEROL, TOTAL 03/13/2025 226 (H)  <200 mg/dL Final   • HDL CHOLESTEROL 03/13/2025 29 (L)  > OR = 40 mg/dL Final   • TRIGLYCERIDES 03/13/2025 567 (H)  <150 mg/dL Final   • LDL-CHOLESTEROL 03/13/2025    Final   • CHOL/HDLC RATIO 03/13/2025 7.8 (H)  <5.0 (calc) Final   • NON HDL CHOLESTEROL 03/13/2025 197 (H)  <130 mg/dL (calc) Final   • GLUCOSE 03/13/2025 114 (H)  65 - 99 mg/dL Final   • UREA NITROGEN (BUN) 03/13/2025 10  7 - 25 mg/dL Final   • CREATININE 03/13/2025 1.09  0.60 - 1.29 mg/dL Final   • EGFR 03/13/2025 87  > OR = 60 mL/min/1.73m2 Final   • SODIUM 03/13/2025 138  135 - 146 mmol/L Final   • POTASSIUM 03/13/2025 4.4  3.5 - 5.3 mmol/L Final   • CHLORIDE 03/13/2025 104  98 - 110 mmol/L Final   • CARBON DIOXIDE 03/13/2025 26  20 - 32 mmol/L Final   • ELECTROLYTE BALANCE 03/13/2025 8  7 - 17 mmol/L (calc) Final   • CALCIUM 03/13/2025 9.3  8.6 - 10.3 mg/dL Final   • PROTEIN, TOTAL 03/13/2025 7.4  6.1 - 8.1 g/dL Final   • ALBUMIN 03/13/2025 4.4   3.6 - 5.1 g/dL Final   • BILIRUBIN, TOTAL 03/13/2025 0.4  0.2 - 1.2 mg/dL Final   • ALKALINE PHOSPHATASE 03/13/2025 60  36 - 130 U/L Final   • AST 03/13/2025 33  10 - 40 U/L Final   • ALT 03/13/2025 32  9 - 46 U/L Final   • HEMOGLOBIN A1c 03/13/2025 5.8 (H)  <5.7 % of total Hgb Final   • eAG (mg/dL) 03/13/2025 120  mg/dL Final   • eAG (mmol/L) 03/13/2025 6.6  mmol/L Final   Office Visit on 03/04/2025   Component Date Value Ref Range Status   • POC Color, Urine 03/04/2025 Yellow  Straw, Yellow, Light-Yellow Final   • POC Appearance, Urine 03/04/2025 Clear  Clear Final   • POC Glucose, Urine 03/04/2025 NEGATIVE  NEGATIVE mg/dl Final   • POC Bilirubin, Urine 03/04/2025 NEGATIVE  NEGATIVE Final   • POC Ketones, Urine 03/04/2025 NEGATIVE  NEGATIVE mg/dl Final   • POC Specific Gravity, Urine 03/04/2025 1.025  1.005 - 1.035 Final   • POC Blood, Urine 03/04/2025 NEGATIVE  NEGATIVE Final   • POC PH, Urine 03/04/2025 6.0  No Reference Range Established PH Final   • POC Protein, Urine 03/04/2025 NEGATIVE  NEGATIVE mg/dl Final   • POC Urobilinogen, Urine 03/04/2025 0.2  0.2, 1.0 EU/DL Final   • Poc Nitrite, Urine 03/04/2025 NEGATIVE  NEGATIVE Final   • POC Leukocytes, Urine 03/04/2025 NEGATIVE  NEGATIVE Final   • CHLAMYDIA TRACHOMATIS RNA, TMA, UR* 03/04/2025 NOT DETECTED  NOT DETECTED Final   • NEISSERIA GONORRHOEAE RNA, TMA, UR* 03/04/2025 NOT DETECTED  NOT DETECTED Final   • (Always Message) 03/04/2025    Final   • HSV 1 03/04/2025 Not Detected  Not Detected Final   • HSV 2 03/04/2025 Detected (A)  Not Detected Final   Admission on 02/14/2025, Discharged on 02/14/2025   Component Date Value Ref Range Status   • Case Report 02/14/2025    Final                    Value:Surgical Pathology                                Case: [id number]                                  Authorizing Provider:  Parker Francis MD PhD    Collected:           02/14/2025 2601              Ordering Location:     [facility]       Received:             02/14/2025 2046                                     [facility] OR                                                             Pathologist:           Simon Garay MD                                                             Specimens:   A) - SURAL NERVE BIOPSY, left sural nerve; saline soaked telfa                                      B) - MUSCLE BIOPSY, Gastrocnemius Muscle Biopsy; saline soaked telfa                      • FINAL DIAGNOSIS 02/14/2025    Final                    Value: A.  Peripheral nerve, left sural, biopsy:  - Peripheral nerve with mild axonal loss, thinly myelinated axons, and rare structures suggestive of axonal sprouts (see comment).    B.  Skeletal muscle, gastrocnemius, biopsy:  - Skeletal muscle, no significant histopathological abnormalities.     •   02/14/2025    Final                    Value:By the signature on this report, the individual or group listed as making the Final Interpretation/Diagnosis certifies that they have reviewed this case.      • Comment 02/14/2025    Final                    Value:There is no inflammation or vasculitis within either biopsy specimen.  Plastic embedded sections of peripheral nerve show no onion bulb formations or macrophage infiltrates.  Skeletal muscle shows no fiber degeneration or regeneration with negative p62 and CD56 staining.  Myosin heavy chain immunostains show no fiber type grouping or small group atrophy.  Oxidative enzyme stains are within normal limits.  Trichrome staining shows neither ragged red fibers nor rimmed vacuoles.  Plastic embedded sections show no structure abnormalities or evidence of storage disease.  Congo red stain for amyloid is negative.     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  A portion is submitted in glutaraldehyde for electron microscopy.  The remainder of the specimen is submitted in one cassette.  BMG/SBS  B: Received fresh, labeled with the patient´s name and hospital number and \" gastrocnemius muscle biopsy\", is a segment of muscle measuring 1.5 x 1.1 x 0.9 cm.  A portion is submitted in glutaraldehyde for possible electron microscopy.  A portion is frozen for enzyme histochemistry.  A portion is frozen for additional studies.  The remainder of the specimen is submitted in one cassette.  [facility]     Summary of Cassettes:  SpecimenLabelSite  B1           formalin fixed tissue-light microscopy                        2           frozen tissue for enzyme histochemistry     • Disclaimer 02/14/2025    Final                    Value:One or more of the reagents used to perform assays on this specimen MAY have contained components considered to be analyte specific reagents (ASR's).  ASR's have not been cleared or approved by the U.S. Food and Drug Administration.  These assays were developed and their performance characteristics determined by the Department of Pathology at [facility]. The FDA does not require this test to go through premarket FDA review. This test is used for clinical purposes. It should not be regarded as investigational or for research. This laboratory is certified under the Clinical Laboratory Improvement Amendments (CLIA) as qualified to perform high complexity clinical laboratory testing.  The assays were performed with appropriate positive and negative controls which stained appropriately.     Pre-Admission Testing on 02/12/2025   Component Date Value Ref Range Status   • Glucose 02/12/2025 99  74 - 99 mg/dL Final   • Sodium 02/12/2025 140  136 - 145 mmol/L Final   • Potassium 02/12/2025 4.8  3.5 - 5.3 mmol/L Final   • Chloride 02/12/2025 103  98 - 107 mmol/L Final "   • Bicarbonate 02/12/2025 20 (L)  21 - 32 mmol/L Final   • Anion Gap 02/12/2025 22 (H)  10 - 20 mmol/L Final   • Urea Nitrogen 02/12/2025 7  6 - 23 mg/dL Final   • Creatinine 02/12/2025 0.95  0.50 - 1.30 mg/dL Final   • eGFR 02/12/2025 >90  >60 mL/min/1.73m*2 Final   • Calcium 02/12/2025 9.0  8.6 - 10.6 mg/dL Final   • Prealbumin 02/12/2025 20.9  18.0 - 40.0 mg/dL Final   • WBC 02/12/2025 5.7  4.4 - 11.3 x10*3/uL Final   • nRBC 02/12/2025 0.0  0.0 - 0.0 /100 WBCs Final   • RBC 02/12/2025 4.60  4.50 - 5.90 x10*6/uL Final   • Hemoglobin 02/12/2025 16.0  13.5 - 17.5 g/dL Final   • Hematocrit 02/12/2025 42.1  41.0 - 52.0 % Final   • MCV 02/12/2025 92  80 - 100 fL Final   • MCH 02/12/2025 34.8 (H)  26.0 - 34.0 pg Final   • MCHC 02/12/2025 38.0 (H)  32.0 - 36.0 g/dL Final   • RDW 02/12/2025 12.6  11.5 - 14.5 % Final   • Platelets 02/12/2025 249  150 - 450 x10*3/uL Final         Assessment/Plan   Diagnoses and all orders for this visit:  Allergic rhinitis due to other allergic trigger, unspecified seasonality  Allergic reaction to inhaled pollen  Chronic allergic conjunctivitis  Allergic rhinitis due to animal (cat) (dog) hair and dander    I reviewed the previous allergy skin testing and current vial formulas. We reviewed the benefits of immunotherapy and continuing maintenance immunotherapy dosing at approximately once per month. We assessed for improvement of symptoms and for any occurrence of local, delayed, or systemic reactions. The patient's symptoms have improved since starting immunotherapy and they have not had any systemic or significant allergic reactions.      The patient has an epinephrine autoinjector, brings it to the injection visits, and is aware of how to use it and waits 30 minutes after the shot as directed.      Medications have been refilled as needed.     Allergy shot given today without incident. Continue the current plan.        Deejay Garcia MD     Time was spent: 40 minutes - Preparing  to see the patient, obtaining and/or reviewing separately obtained history, performing a medically necessary appropriate physical examination and/or evaluation, counseling and educating the patient/family, ordering medications, tests or procedures, referring and communicating with other providers, documenting clinical information in the medical record, independently interpreting results and communicating results to the patient/family, and care coordination. Review of pt's chart.         [1]  Current Outpatient Medications   Medication Sig Dispense Refill   • albuterol (ProAir HFA) 90 mcg/actuation inhaler Inhale 2 puffs every 4 hours if needed for wheezing or shortness of breath. 8.5 g 1   • albuterol 2.5 mg /3 mL (0.083 %) nebulizer solution Inhale 3 mL (2.5 mg) every 6 hours if needed.     • ALPRAZolam (Xanax) 2 mg tablet Take 1 tablet (2 mg) by mouth 4 times a day as needed.     • azelastine (Astelin) 137 mcg (0.1 %) nasal spray Administer 2 sprays into each nostril 2 times a day. Use in each nostril as directed 30 mL 3   • buPROPion (Wellbutrin) 75 mg tablet Take 2 tablets (150 mg) by mouth 2 times a day.     • cetirizine (ZyrTEC) 10 mg tablet Take 1 tablet (10 mg) by mouth once daily.     • cyclobenzaprine (Flexeril) 10 mg tablet Take 1 tablet (10 mg) by mouth 2 times a day. 180 tablet 3   • EPINEPHrine 0.3 mg/0.3 mL injection syringe Inject 0.3 mL (0.3 mg) into the muscle 1 time if needed for anaphylaxis for up to 1 dose. As directed 2 each 3   • fenofibrate (Tricor) 145 mg tablet Take 1 tablet (145 mg) by mouth once daily. 90 tablet 0   • fluticasone (Flonase) 50 mcg/actuation nasal spray Administer 2 sprays into each nostril once daily. Shake gently. Before first use, prime pump. After use, clean tip and replace cap. 16 g 1   • gabapentin (Neurontin) 100 mg capsule Take 2 capsules (200 mg) by mouth 3 times a day. 540 capsule 3   • gabapentin (Neurontin) 600 mg tablet TAKE 3 TABLETS BY MOUTH 3 TIMES DAILY AS  DIRECTED 810 tablet 1   • hydrOXYzine pamoate (Vistaril) 25 mg capsule TAKE 3-4 CAPSULES BY MOUTH AT BEDTIME AS NEEDED     • immun glob G,IgG,/gly/IgA ov50 (GAMMAGARD LIQUID INJ) Inject as directed. As directed     • ketoconazole (NIZOral) 2 % shampoo PLEASE SEE ATTACHED FOR DETAILED DIRECTIONS     • lisinopril 30 mg tablet Take 1 tablet (30 mg) by mouth once daily. 90 tablet 0   • lubiprostone (Amitiza) 24 mcg capsule Take 1 capsule (24 mcg) by mouth 2 times a day. With food 180 capsule 1   • meloxicam (Mobic) 15 mg tablet TAKE 1 TABLET BY MOUTH EVERY DAY AS NEEDED 30 tablet 5   • methylPREDNISolone (Medrol Dospak) 4 mg tablets TAKE 6 TABLETS ON DAY 1 AS DIRECTED ON PACKAGE AND DECREASE BY 1 TAB EACH DAY FOR A TOTAL OF 6 DAYS (Patient taking differently: if needed.) 21 each 5   • mirtazapine (Remeron) 30 mg tablet Take 2 tablets (60 mg) by mouth once daily at bedtime.     • multivitamin tablet Take 1 tablet by mouth once daily.     • naloxone (Narcan) 4 mg/0.1 mL nasal spray Administer 1 spray (4 mg) into affected nostril(s). 1 actuation in one nostril x1, may repeat dose q2-3min until pt responsive or EMS arrives     • omeprazole (PriLOSEC) 40 mg DR capsule Take 1 capsule (40 mg) by mouth once daily in the morning. Take before meals. Do not crush or chew. 90 capsule 1   • oxyCODONE (Roxicodone) 10 mg immediate release tablet Take 1 tablet (10 mg) by mouth every 6 hours if needed for severe pain (7 - 10). 115 tablet 0   • PARoxetine (Paxil) 20 mg tablet Take 1 tablet (20 mg) by mouth once daily in the morning.     • propranolol (Inderal) 10 mg tablet Take 1 tablet (10 mg) by mouth 2 times a day as needed (anxiety).     • tazarotene (Tazorac) 0.1 % cream APPLY PEA SIZED AMOUNT TOPICALLY TO FACE 2-3 NIGHTS A WEEK     • tirzepatide, weight loss, (Zepbound) 2.5 mg/0.5 mL injection Inject 2.5 mg under the skin every 7 days. (Patient not taking: Reported on 6/20/2025) 4 each 0   • TURMERIC ORAL Turmeric 500 MG Oral  Capsule     • ZINC ORAL Take by mouth once daily. Zinc 100 MG Oral Tablet       No current facility-administered medications for this visit.

## 2025-07-02 DIAGNOSIS — J30.1 ALLERGIC REACTION TO INHALED POLLEN: ICD-10-CM

## 2025-07-02 DIAGNOSIS — J30.89 ALLERGIC RHINITIS DUE TO OTHER ALLERGIC TRIGGER, UNSPECIFIED SEASONALITY: Primary | ICD-10-CM

## 2025-07-02 DIAGNOSIS — J30.81 ALLERGIC RHINITIS DUE TO ANIMAL HAIR AND DANDER: ICD-10-CM

## 2025-07-02 PROCEDURE — 95165 ANTIGEN THERAPY SERVICES: CPT | Performed by: ALLERGY & IMMUNOLOGY

## 2025-07-09 DIAGNOSIS — R73.03 PREDIABETES: Primary | ICD-10-CM

## 2025-07-09 DIAGNOSIS — E78.5 HYPERLIPIDEMIA, UNSPECIFIED HYPERLIPIDEMIA TYPE: ICD-10-CM

## 2025-07-10 ENCOUNTER — INFUSION (OUTPATIENT)
Dept: INFUSION THERAPY | Facility: HOSPITAL | Age: 45
End: 2025-07-10
Payer: MEDICARE

## 2025-07-10 VITALS
TEMPERATURE: 98.4 F | HEART RATE: 60 BPM | OXYGEN SATURATION: 96 % | BODY MASS INDEX: 33.05 KG/M2 | RESPIRATION RATE: 18 BRPM | WEIGHT: 237 LBS | DIASTOLIC BLOOD PRESSURE: 76 MMHG | SYSTOLIC BLOOD PRESSURE: 132 MMHG

## 2025-07-10 DIAGNOSIS — G62.9 SMALL FIBER NEUROPATHY: ICD-10-CM

## 2025-07-10 DIAGNOSIS — M35.01 SJOGREN'S SYNDROME WITH KERATOCONJUNCTIVITIS SICCA: ICD-10-CM

## 2025-07-10 PROCEDURE — 96375 TX/PRO/DX INJ NEW DRUG ADDON: CPT | Mod: INF

## 2025-07-10 PROCEDURE — 2500000004 HC RX 250 GENERAL PHARMACY W/ HCPCS (ALT 636 FOR OP/ED): Mod: JZ | Performed by: PSYCHIATRY & NEUROLOGY

## 2025-07-10 PROCEDURE — 96366 THER/PROPH/DIAG IV INF ADDON: CPT | Mod: INF

## 2025-07-10 PROCEDURE — 2500000001 HC RX 250 WO HCPCS SELF ADMINISTERED DRUGS (ALT 637 FOR MEDICARE OP): Performed by: PSYCHIATRY & NEUROLOGY

## 2025-07-10 PROCEDURE — 96365 THER/PROPH/DIAG IV INF INIT: CPT | Mod: INF

## 2025-07-10 RX ORDER — FAMOTIDINE 10 MG/ML
20 INJECTION, SOLUTION INTRAVENOUS ONCE AS NEEDED
Status: CANCELLED | OUTPATIENT
Start: 2025-07-30

## 2025-07-10 RX ORDER — DIPHENHYDRAMINE HCL 25 MG
25 CAPSULE ORAL ONCE
Status: CANCELLED | OUTPATIENT
Start: 2025-07-30

## 2025-07-10 RX ORDER — ALBUTEROL SULFATE 0.83 MG/ML
3 SOLUTION RESPIRATORY (INHALATION) AS NEEDED
Status: CANCELLED | OUTPATIENT
Start: 2025-07-30

## 2025-07-10 RX ORDER — ACETAMINOPHEN 325 MG/1
650 TABLET ORAL ONCE
Status: COMPLETED | OUTPATIENT
Start: 2025-07-10 | End: 2025-07-10

## 2025-07-10 RX ORDER — ACETAMINOPHEN 325 MG/1
650 TABLET ORAL ONCE
Status: CANCELLED | OUTPATIENT
Start: 2025-07-30

## 2025-07-10 RX ORDER — DIPHENHYDRAMINE HCL 25 MG
25 CAPSULE ORAL ONCE
Status: COMPLETED | OUTPATIENT
Start: 2025-07-10 | End: 2025-07-10

## 2025-07-10 RX ORDER — EPINEPHRINE 0.3 MG/.3ML
0.3 INJECTION SUBCUTANEOUS EVERY 5 MIN PRN
Status: CANCELLED | OUTPATIENT
Start: 2025-07-30

## 2025-07-10 RX ORDER — DIPHENHYDRAMINE HYDROCHLORIDE 50 MG/ML
50 INJECTION, SOLUTION INTRAMUSCULAR; INTRAVENOUS AS NEEDED
Status: CANCELLED | OUTPATIENT
Start: 2025-07-30

## 2025-07-10 RX ADMIN — DIPHENHYDRAMINE HYDROCHLORIDE 25 MG: 25 CAPSULE ORAL at 11:52

## 2025-07-10 RX ADMIN — IMMUNE GLOBULIN INFUSION (HUMAN) 45 G: 100 INJECTION, SOLUTION INTRAVENOUS; SUBCUTANEOUS at 12:32

## 2025-07-10 RX ADMIN — HYDROCORTISONE SODIUM SUCCINATE 100 MG: 100 INJECTION, POWDER, FOR SOLUTION INTRAMUSCULAR; INTRAVENOUS at 11:52

## 2025-07-10 RX ADMIN — ACETAMINOPHEN 650 MG: 325 TABLET ORAL at 11:52

## 2025-07-11 ENCOUNTER — INFUSION (OUTPATIENT)
Dept: INFUSION THERAPY | Facility: HOSPITAL | Age: 45
End: 2025-07-11
Payer: MEDICARE

## 2025-07-11 VITALS
HEART RATE: 60 BPM | DIASTOLIC BLOOD PRESSURE: 76 MMHG | OXYGEN SATURATION: 98 % | SYSTOLIC BLOOD PRESSURE: 123 MMHG | RESPIRATION RATE: 18 BRPM | TEMPERATURE: 97.4 F

## 2025-07-11 DIAGNOSIS — G62.9 SMALL FIBER NEUROPATHY: ICD-10-CM

## 2025-07-11 DIAGNOSIS — M35.01 SJOGREN'S SYNDROME WITH KERATOCONJUNCTIVITIS SICCA: ICD-10-CM

## 2025-07-11 PROCEDURE — 96375 TX/PRO/DX INJ NEW DRUG ADDON: CPT | Mod: INF

## 2025-07-11 PROCEDURE — 96365 THER/PROPH/DIAG IV INF INIT: CPT | Mod: INF

## 2025-07-11 PROCEDURE — 2500000004 HC RX 250 GENERAL PHARMACY W/ HCPCS (ALT 636 FOR OP/ED): Performed by: PSYCHIATRY & NEUROLOGY

## 2025-07-11 PROCEDURE — 96366 THER/PROPH/DIAG IV INF ADDON: CPT | Mod: INF

## 2025-07-11 PROCEDURE — 2500000001 HC RX 250 WO HCPCS SELF ADMINISTERED DRUGS (ALT 637 FOR MEDICARE OP): Performed by: PSYCHIATRY & NEUROLOGY

## 2025-07-11 RX ORDER — DIPHENHYDRAMINE HCL 25 MG
25 CAPSULE ORAL ONCE
Status: COMPLETED | OUTPATIENT
Start: 2025-07-11 | End: 2025-07-11

## 2025-07-11 RX ORDER — EPINEPHRINE 0.3 MG/.3ML
0.3 INJECTION SUBCUTANEOUS EVERY 5 MIN PRN
OUTPATIENT
Start: 2025-07-30

## 2025-07-11 RX ORDER — DIPHENHYDRAMINE HCL 25 MG
25 CAPSULE ORAL ONCE
OUTPATIENT
Start: 2025-07-30

## 2025-07-11 RX ORDER — ACETAMINOPHEN 325 MG/1
650 TABLET ORAL ONCE
Status: COMPLETED | OUTPATIENT
Start: 2025-07-11 | End: 2025-07-11

## 2025-07-11 RX ORDER — FAMOTIDINE 10 MG/ML
20 INJECTION, SOLUTION INTRAVENOUS ONCE AS NEEDED
OUTPATIENT
Start: 2025-07-30

## 2025-07-11 RX ORDER — ACETAMINOPHEN 325 MG/1
650 TABLET ORAL ONCE
OUTPATIENT
Start: 2025-07-30

## 2025-07-11 RX ORDER — ALBUTEROL SULFATE 0.83 MG/ML
3 SOLUTION RESPIRATORY (INHALATION) AS NEEDED
OUTPATIENT
Start: 2025-07-30

## 2025-07-11 RX ORDER — DIPHENHYDRAMINE HYDROCHLORIDE 50 MG/ML
50 INJECTION, SOLUTION INTRAMUSCULAR; INTRAVENOUS AS NEEDED
OUTPATIENT
Start: 2025-07-30

## 2025-07-11 RX ADMIN — ACETAMINOPHEN 650 MG: 325 TABLET ORAL at 08:50

## 2025-07-11 RX ADMIN — IMMUNE GLOBULIN INFUSION (HUMAN) 45 G: 100 INJECTION, SOLUTION INTRAVENOUS; SUBCUTANEOUS at 09:43

## 2025-07-11 RX ADMIN — DIPHENHYDRAMINE HYDROCHLORIDE 25 MG: 25 CAPSULE ORAL at 08:50

## 2025-07-11 RX ADMIN — HYDROCORTISONE SODIUM SUCCINATE 100 MG: 100 INJECTION, POWDER, FOR SOLUTION INTRAMUSCULAR; INTRAVENOUS at 08:50

## 2025-07-14 DIAGNOSIS — G62.9 POLYNEUROPATHY, UNSPECIFIED: ICD-10-CM

## 2025-07-14 RX ORDER — GABAPENTIN 100 MG/1
200 CAPSULE ORAL 3 TIMES DAILY
Qty: 540 CAPSULE | Refills: 3 | Status: SHIPPED | OUTPATIENT
Start: 2025-07-14

## 2025-07-21 DIAGNOSIS — M35.06 SJOGREN'S SYNDROME WITH PERIPHERAL NERVOUS SYSTEM INVOLVEMENT: ICD-10-CM

## 2025-07-21 DIAGNOSIS — M35.01 SJOGREN SYNDROME WITH KERATOCONJUNCTIVITIS: ICD-10-CM

## 2025-07-21 RX ORDER — OXYCODONE HYDROCHLORIDE 10 MG/1
10 TABLET ORAL EVERY 6 HOURS PRN
Qty: 115 TABLET | Refills: 0 | Status: SHIPPED | OUTPATIENT
Start: 2025-07-21

## 2025-07-21 RX ORDER — METHYLPREDNISOLONE 4 MG/1
TABLET ORAL
Qty: 21 EACH | Refills: 5 | Status: SHIPPED | OUTPATIENT
Start: 2025-07-21

## 2025-07-23 ENCOUNTER — APPOINTMENT (OUTPATIENT)
Dept: PRIMARY CARE | Facility: CLINIC | Age: 45
End: 2025-07-23
Payer: MEDICARE

## 2025-07-23 VITALS
SYSTOLIC BLOOD PRESSURE: 114 MMHG | OXYGEN SATURATION: 98 % | DIASTOLIC BLOOD PRESSURE: 74 MMHG | BODY MASS INDEX: 33.61 KG/M2 | WEIGHT: 241 LBS | TEMPERATURE: 96.8 F | HEART RATE: 74 BPM

## 2025-07-23 DIAGNOSIS — A60.02 HERPES GENITALIS IN MEN: Primary | ICD-10-CM

## 2025-07-23 DIAGNOSIS — E66.811 OBESITY (BMI 30.0-34.9): ICD-10-CM

## 2025-07-23 PROCEDURE — 1036F TOBACCO NON-USER: CPT | Performed by: FAMILY MEDICINE

## 2025-07-23 PROCEDURE — 99214 OFFICE O/P EST MOD 30 MIN: CPT | Performed by: FAMILY MEDICINE

## 2025-07-23 PROCEDURE — G2211 COMPLEX E/M VISIT ADD ON: HCPCS | Performed by: FAMILY MEDICINE

## 2025-07-23 PROCEDURE — 3074F SYST BP LT 130 MM HG: CPT | Performed by: FAMILY MEDICINE

## 2025-07-23 PROCEDURE — 3078F DIAST BP <80 MM HG: CPT | Performed by: FAMILY MEDICINE

## 2025-07-23 RX ORDER — VALACYCLOVIR HYDROCHLORIDE 1 G/1
TABLET, FILM COATED ORAL
Qty: 10 TABLET | Refills: 4 | Status: SHIPPED | OUTPATIENT
Start: 2025-07-23

## 2025-07-23 RX ORDER — SEMAGLUTIDE 0.25 MG/.5ML
0.25 INJECTION, SOLUTION SUBCUTANEOUS WEEKLY
Qty: 2 ML | Refills: 0 | Status: SHIPPED | OUTPATIENT
Start: 2025-07-23 | End: 2025-08-14

## 2025-07-23 ASSESSMENT — ENCOUNTER SYMPTOMS
ABDOMINAL PAIN: 0
PALPITATIONS: 0
DYSURIA: 0
HEADACHES: 0
SHORTNESS OF BREATH: 0
DIFFICULTY URINATING: 0
SLEEP DISTURBANCE: 0
FATIGUE: 1
NAUSEA: 0
VOMITING: 0
MYALGIAS: 0
DIZZINESS: 0
BLOOD IN STOOL: 0

## 2025-07-23 NOTE — PATIENT INSTRUCTIONS
Continue as needed valtrex.    Keep track of outbreaks.     Start wegovy. If tolerating, call for higher dose in 1 month    Return as scheduled, sooner if needed

## 2025-07-23 NOTE — PROGRESS NOTES
Subjective   Patient ID: Luis Condon Jr. is a 44 y.o. male who presents for Mouth Lesions (Recheck ) and multiple issues.     HPI   HSV: had another genital outbreak about 1 wk ago. Healing now. Started w/ enlarged LN rt inguinal area. Had small ulcerations rt side of penis. Wife w/o symptoms. No dysuria. Enlarged LN resolved.     BMI: high. Zepbound not covered. Would like to try wegovy. Working on diet. Has not been able to lose wt.    Review of Systems   Constitutional:  Positive for fatigue (chronic).   Eyes:  Negative for visual disturbance.   Respiratory:  Negative for shortness of breath.    Cardiovascular:  Negative for chest pain and palpitations.   Gastrointestinal:  Negative for abdominal pain, blood in stool, nausea and vomiting.   Genitourinary:  Negative for difficulty urinating and dysuria.   Musculoskeletal:  Negative for myalgias.   Skin:  Negative for rash.   Neurological:  Negative for dizziness and headaches.   Psychiatric/Behavioral:  Negative for sleep disturbance.        Objective   /74   Pulse 74   Temp 36 °C (96.8 °F)   Wt 109 kg (241 lb)   SpO2 98%   BMI 33.61 kg/m²     Physical Exam  Vitals and nursing note reviewed.   Constitutional:       General: He is not in acute distress.     Appearance: Normal appearance. He is not toxic-appearing.   HENT:      Head: Normocephalic.      Mouth/Throat:      Pharynx: Oropharynx is clear.     Eyes:      General: No scleral icterus.     Pupils: Pupils are equal, round, and reactive to light.       Cardiovascular:      Rate and Rhythm: Normal rate and regular rhythm.      Heart sounds: No murmur heard.  Pulmonary:      Effort: Pulmonary effort is normal. No respiratory distress.      Breath sounds: Normal breath sounds.   Abdominal:      General: Bowel sounds are normal.   Genitourinary:     Comments: Hypopigmented area rt side of shaft. No active ulceration    Musculoskeletal:         General: No tenderness.      Cervical back: Neck  supple.     Skin:     General: Skin is warm.     Neurological:      General: No focal deficit present.      Mental Status: He is alert.      Cranial Nerves: No cranial nerve deficit.     Psychiatric:         Mood and Affect: Mood normal.         Assessment/Plan   Problem List Items Addressed This Visit           ICD-10-CM    Herpes genitalis in men - Primary A60.02    Relevant Medications    valACYclovir (Valtrex) 1 gram tablet     Other Visit Diagnoses         Codes      Obesity (BMI 30.0-34.9)     E66.811    Relevant Medications    semaglutide, weight loss, (Wegovy) 0.25 mg/0.5 mL pen injector        Reviewed prior HSV culture.  Declines suppressive therapy.  Patient wants to use Valtrex as needed for now.  If has multiple outbreaks, he will consider daily suppressive therapy.  Discussed side effects of meds.  Recommendations given

## 2025-07-29 DIAGNOSIS — G62.89 SMALL FIBER POLYNEUROPATHY: ICD-10-CM

## 2025-07-29 DIAGNOSIS — M35.01 SJOGREN'S SYNDROME WITH KERATOCONJUNCTIVITIS SICCA: ICD-10-CM

## 2025-07-30 RX ORDER — GABAPENTIN 600 MG/1
1800 TABLET ORAL
Qty: 810 TABLET | Refills: 3 | Status: SHIPPED | OUTPATIENT
Start: 2025-07-30

## 2025-07-30 RX ORDER — CYCLOBENZAPRINE HCL 10 MG
10 TABLET ORAL 2 TIMES DAILY
Qty: 180 TABLET | Refills: 3 | Status: SHIPPED | OUTPATIENT
Start: 2025-07-30

## 2025-07-31 ENCOUNTER — INFUSION (OUTPATIENT)
Dept: INFUSION THERAPY | Facility: HOSPITAL | Age: 45
End: 2025-07-31
Payer: MEDICARE

## 2025-07-31 VITALS
BODY MASS INDEX: 33.75 KG/M2 | SYSTOLIC BLOOD PRESSURE: 131 MMHG | HEART RATE: 62 BPM | WEIGHT: 242 LBS | RESPIRATION RATE: 16 BRPM | DIASTOLIC BLOOD PRESSURE: 71 MMHG | OXYGEN SATURATION: 97 % | TEMPERATURE: 97.2 F

## 2025-07-31 DIAGNOSIS — G62.9 SMALL FIBER NEUROPATHY: ICD-10-CM

## 2025-07-31 DIAGNOSIS — M35.01 SJOGREN'S SYNDROME WITH KERATOCONJUNCTIVITIS SICCA: ICD-10-CM

## 2025-07-31 PROCEDURE — 96365 THER/PROPH/DIAG IV INF INIT: CPT | Mod: INF

## 2025-07-31 PROCEDURE — 2500000004 HC RX 250 GENERAL PHARMACY W/ HCPCS (ALT 636 FOR OP/ED): Performed by: PSYCHIATRY & NEUROLOGY

## 2025-07-31 PROCEDURE — 2500000004 HC RX 250 GENERAL PHARMACY W/ HCPCS (ALT 636 FOR OP/ED): Mod: JZ | Performed by: PSYCHIATRY & NEUROLOGY

## 2025-07-31 PROCEDURE — 2500000001 HC RX 250 WO HCPCS SELF ADMINISTERED DRUGS (ALT 637 FOR MEDICARE OP): Performed by: PSYCHIATRY & NEUROLOGY

## 2025-07-31 PROCEDURE — 96375 TX/PRO/DX INJ NEW DRUG ADDON: CPT | Mod: INF

## 2025-07-31 PROCEDURE — 96366 THER/PROPH/DIAG IV INF ADDON: CPT | Mod: INF

## 2025-07-31 RX ORDER — DIPHENHYDRAMINE HYDROCHLORIDE 50 MG/ML
50 INJECTION, SOLUTION INTRAMUSCULAR; INTRAVENOUS AS NEEDED
OUTPATIENT
Start: 2025-08-01

## 2025-07-31 RX ORDER — FAMOTIDINE 10 MG/ML
20 INJECTION, SOLUTION INTRAVENOUS ONCE AS NEEDED
OUTPATIENT
Start: 2025-08-01

## 2025-07-31 RX ORDER — EPINEPHRINE 0.3 MG/.3ML
0.3 INJECTION SUBCUTANEOUS EVERY 5 MIN PRN
OUTPATIENT
Start: 2025-08-01

## 2025-07-31 RX ORDER — ACETAMINOPHEN 325 MG/1
650 TABLET ORAL ONCE
Status: CANCELLED | OUTPATIENT
Start: 2025-08-01

## 2025-07-31 RX ORDER — ALBUTEROL SULFATE 0.83 MG/ML
3 SOLUTION RESPIRATORY (INHALATION) AS NEEDED
OUTPATIENT
Start: 2025-08-01

## 2025-07-31 RX ORDER — DIPHENHYDRAMINE HCL 25 MG
25 CAPSULE ORAL ONCE
Status: COMPLETED | OUTPATIENT
Start: 2025-07-31 | End: 2025-07-31

## 2025-07-31 RX ORDER — ACETAMINOPHEN 325 MG/1
650 TABLET ORAL ONCE
Status: COMPLETED | OUTPATIENT
Start: 2025-07-31 | End: 2025-07-31

## 2025-07-31 RX ORDER — DIPHENHYDRAMINE HCL 25 MG
25 CAPSULE ORAL ONCE
Status: CANCELLED | OUTPATIENT
Start: 2025-08-01

## 2025-07-31 RX ADMIN — HYDROCORTISONE SODIUM SUCCINATE 100 MG: 100 INJECTION, POWDER, FOR SOLUTION INTRAMUSCULAR; INTRAVENOUS at 09:07

## 2025-07-31 RX ADMIN — DIPHENHYDRAMINE HYDROCHLORIDE 25 MG: 25 CAPSULE ORAL at 09:06

## 2025-07-31 RX ADMIN — ACETAMINOPHEN 650 MG: 325 TABLET ORAL at 09:06

## 2025-07-31 RX ADMIN — IMMUNE GLOBULIN INFUSION (HUMAN) 45 G: 100 INJECTION, SOLUTION INTRAVENOUS; SUBCUTANEOUS at 10:07

## 2025-07-31 ASSESSMENT — PAIN SCALES - GENERAL: PAINLEVEL_OUTOF10: 6

## 2025-08-01 ENCOUNTER — INFUSION (OUTPATIENT)
Dept: INFUSION THERAPY | Facility: HOSPITAL | Age: 45
End: 2025-08-01
Payer: MEDICARE

## 2025-08-01 VITALS
RESPIRATION RATE: 18 BRPM | TEMPERATURE: 97 F | OXYGEN SATURATION: 98 % | SYSTOLIC BLOOD PRESSURE: 132 MMHG | HEART RATE: 60 BPM | DIASTOLIC BLOOD PRESSURE: 87 MMHG

## 2025-08-01 DIAGNOSIS — G62.9 SMALL FIBER NEUROPATHY: ICD-10-CM

## 2025-08-01 DIAGNOSIS — M35.01 SJOGREN'S SYNDROME WITH KERATOCONJUNCTIVITIS SICCA: ICD-10-CM

## 2025-08-01 PROCEDURE — 96365 THER/PROPH/DIAG IV INF INIT: CPT | Mod: INF

## 2025-08-01 PROCEDURE — 2500000004 HC RX 250 GENERAL PHARMACY W/ HCPCS (ALT 636 FOR OP/ED): Performed by: PSYCHIATRY & NEUROLOGY

## 2025-08-01 PROCEDURE — 96375 TX/PRO/DX INJ NEW DRUG ADDON: CPT | Mod: INF

## 2025-08-01 PROCEDURE — 2500000001 HC RX 250 WO HCPCS SELF ADMINISTERED DRUGS (ALT 637 FOR MEDICARE OP): Performed by: PSYCHIATRY & NEUROLOGY

## 2025-08-01 PROCEDURE — 96366 THER/PROPH/DIAG IV INF ADDON: CPT | Mod: INF

## 2025-08-01 RX ORDER — ACETAMINOPHEN 325 MG/1
650 TABLET ORAL ONCE
OUTPATIENT
Start: 2025-08-21

## 2025-08-01 RX ORDER — ALBUTEROL SULFATE 0.83 MG/ML
3 SOLUTION RESPIRATORY (INHALATION) AS NEEDED
OUTPATIENT
Start: 2025-08-21

## 2025-08-01 RX ORDER — ACETAMINOPHEN 325 MG/1
650 TABLET ORAL ONCE
Status: COMPLETED | OUTPATIENT
Start: 2025-08-01 | End: 2025-08-01

## 2025-08-01 RX ORDER — FAMOTIDINE 10 MG/ML
20 INJECTION, SOLUTION INTRAVENOUS ONCE AS NEEDED
OUTPATIENT
Start: 2025-08-21

## 2025-08-01 RX ORDER — DIPHENHYDRAMINE HCL 25 MG
25 CAPSULE ORAL ONCE
Status: COMPLETED | OUTPATIENT
Start: 2025-08-01 | End: 2025-08-01

## 2025-08-01 RX ORDER — EPINEPHRINE 0.3 MG/.3ML
0.3 INJECTION SUBCUTANEOUS EVERY 5 MIN PRN
OUTPATIENT
Start: 2025-08-21

## 2025-08-01 RX ORDER — DIPHENHYDRAMINE HYDROCHLORIDE 50 MG/ML
50 INJECTION, SOLUTION INTRAMUSCULAR; INTRAVENOUS AS NEEDED
OUTPATIENT
Start: 2025-08-21

## 2025-08-01 RX ORDER — DIPHENHYDRAMINE HCL 25 MG
25 CAPSULE ORAL ONCE
OUTPATIENT
Start: 2025-08-21

## 2025-08-01 RX ADMIN — HYDROCORTISONE SODIUM SUCCINATE 100 MG: 100 INJECTION, POWDER, FOR SOLUTION INTRAMUSCULAR; INTRAVENOUS at 08:49

## 2025-08-01 RX ADMIN — IMMUNE GLOBULIN INFUSION (HUMAN) 45 G: 100 INJECTION, SOLUTION INTRAVENOUS; SUBCUTANEOUS at 09:27

## 2025-08-01 RX ADMIN — ACETAMINOPHEN 650 MG: 325 TABLET ORAL at 08:49

## 2025-08-01 RX ADMIN — DIPHENHYDRAMINE HYDROCHLORIDE 25 MG: 25 CAPSULE ORAL at 08:49

## 2025-08-01 ASSESSMENT — ENCOUNTER SYMPTOMS
DEPRESSION: 0
OCCASIONAL FEELINGS OF UNSTEADINESS: 0
LOSS OF SENSATION IN FEET: 0

## 2025-08-07 ENCOUNTER — APPOINTMENT (OUTPATIENT)
Dept: ALLERGY | Facility: CLINIC | Age: 45
End: 2025-08-07
Payer: MEDICARE

## 2025-08-07 DIAGNOSIS — J30.89 ALLERGIC RHINITIS DUE TO OTHER ALLERGIC TRIGGER, UNSPECIFIED SEASONALITY: ICD-10-CM

## 2025-08-07 PROCEDURE — 95117 IMMUNOTHERAPY INJECTIONS: CPT | Performed by: ALLERGY & IMMUNOLOGY

## 2025-08-14 ENCOUNTER — APPOINTMENT (OUTPATIENT)
Dept: ALLERGY | Facility: CLINIC | Age: 45
End: 2025-08-14
Payer: MEDICARE

## 2025-08-14 DIAGNOSIS — J30.89 ALLERGIC RHINITIS DUE TO OTHER ALLERGIC TRIGGER, UNSPECIFIED SEASONALITY: ICD-10-CM

## 2025-08-14 PROCEDURE — 95117 IMMUNOTHERAPY INJECTIONS: CPT | Performed by: ALLERGY & IMMUNOLOGY

## 2025-08-19 ENCOUNTER — APPOINTMENT (OUTPATIENT)
Dept: NEUROSURGERY | Facility: CLINIC | Age: 45
End: 2025-08-19
Payer: MEDICARE

## 2025-08-19 ENCOUNTER — DOCUMENTATION (OUTPATIENT)
Dept: NEUROLOGY | Facility: HOSPITAL | Age: 45
End: 2025-08-19

## 2025-08-20 DIAGNOSIS — M35.06 SJOGREN'S SYNDROME WITH PERIPHERAL NERVOUS SYSTEM INVOLVEMENT: ICD-10-CM

## 2025-08-20 RX ORDER — OXYCODONE HYDROCHLORIDE 10 MG/1
10 TABLET ORAL EVERY 6 HOURS PRN
Qty: 115 TABLET | Refills: 0 | Status: SHIPPED | OUTPATIENT
Start: 2025-08-20

## 2025-08-21 ENCOUNTER — APPOINTMENT (OUTPATIENT)
Dept: ALLERGY | Facility: CLINIC | Age: 45
End: 2025-08-21
Payer: MEDICARE

## 2025-08-21 ENCOUNTER — INFUSION (OUTPATIENT)
Dept: INFUSION THERAPY | Facility: HOSPITAL | Age: 45
End: 2025-08-21
Payer: MEDICARE

## 2025-08-21 VITALS
RESPIRATION RATE: 18 BRPM | BODY MASS INDEX: 33.4 KG/M2 | DIASTOLIC BLOOD PRESSURE: 72 MMHG | TEMPERATURE: 98.2 F | SYSTOLIC BLOOD PRESSURE: 131 MMHG | HEART RATE: 64 BPM | WEIGHT: 239.5 LBS | OXYGEN SATURATION: 98 %

## 2025-08-21 DIAGNOSIS — G62.9 SMALL FIBER NEUROPATHY: ICD-10-CM

## 2025-08-21 DIAGNOSIS — M35.01 SJOGREN'S SYNDROME WITH KERATOCONJUNCTIVITIS SICCA: ICD-10-CM

## 2025-08-21 DIAGNOSIS — J30.89 ALLERGIC RHINITIS DUE TO OTHER ALLERGIC TRIGGER, UNSPECIFIED SEASONALITY: ICD-10-CM

## 2025-08-21 PROCEDURE — 96366 THER/PROPH/DIAG IV INF ADDON: CPT | Mod: INF

## 2025-08-21 PROCEDURE — 2500000001 HC RX 250 WO HCPCS SELF ADMINISTERED DRUGS (ALT 637 FOR MEDICARE OP): Performed by: PSYCHIATRY & NEUROLOGY

## 2025-08-21 PROCEDURE — 2500000004 HC RX 250 GENERAL PHARMACY W/ HCPCS (ALT 636 FOR OP/ED): Performed by: PSYCHIATRY & NEUROLOGY

## 2025-08-21 PROCEDURE — 96375 TX/PRO/DX INJ NEW DRUG ADDON: CPT | Mod: INF

## 2025-08-21 PROCEDURE — 96365 THER/PROPH/DIAG IV INF INIT: CPT | Mod: INF

## 2025-08-21 PROCEDURE — 95117 IMMUNOTHERAPY INJECTIONS: CPT | Performed by: ALLERGY & IMMUNOLOGY

## 2025-08-21 RX ORDER — DIPHENHYDRAMINE HCL 25 MG
25 CAPSULE ORAL ONCE
Status: CANCELLED | OUTPATIENT
Start: 2025-08-22

## 2025-08-21 RX ORDER — DIPHENHYDRAMINE HYDROCHLORIDE 50 MG/ML
50 INJECTION, SOLUTION INTRAMUSCULAR; INTRAVENOUS AS NEEDED
OUTPATIENT
Start: 2025-08-22

## 2025-08-21 RX ORDER — EPINEPHRINE 0.3 MG/.3ML
0.3 INJECTION SUBCUTANEOUS EVERY 5 MIN PRN
OUTPATIENT
Start: 2025-08-22

## 2025-08-21 RX ORDER — ALBUTEROL SULFATE 0.83 MG/ML
3 SOLUTION RESPIRATORY (INHALATION) AS NEEDED
OUTPATIENT
Start: 2025-08-22

## 2025-08-21 RX ORDER — DIPHENHYDRAMINE HCL 25 MG
25 CAPSULE ORAL ONCE
Status: COMPLETED | OUTPATIENT
Start: 2025-08-21 | End: 2025-08-21

## 2025-08-21 RX ORDER — ACETAMINOPHEN 325 MG/1
650 TABLET ORAL ONCE
Status: COMPLETED | OUTPATIENT
Start: 2025-08-21 | End: 2025-08-21

## 2025-08-21 RX ORDER — ACETAMINOPHEN 325 MG/1
650 TABLET ORAL ONCE
Status: CANCELLED | OUTPATIENT
Start: 2025-08-22

## 2025-08-21 RX ORDER — FAMOTIDINE 10 MG/ML
20 INJECTION, SOLUTION INTRAVENOUS ONCE AS NEEDED
OUTPATIENT
Start: 2025-08-22

## 2025-08-21 RX ADMIN — DIPHENHYDRAMINE HYDROCHLORIDE 25 MG: 25 CAPSULE ORAL at 08:57

## 2025-08-21 RX ADMIN — HYDROCORTISONE SODIUM SUCCINATE 100 MG: 100 INJECTION, POWDER, FOR SOLUTION INTRAMUSCULAR; INTRAVENOUS at 08:57

## 2025-08-21 RX ADMIN — IMMUNE GLOBULIN INFUSION (HUMAN) 45 G: 100 INJECTION, SOLUTION INTRAVENOUS; SUBCUTANEOUS at 09:18

## 2025-08-21 RX ADMIN — ACETAMINOPHEN 650 MG: 325 TABLET ORAL at 08:57

## 2025-08-21 ASSESSMENT — COLUMBIA-SUICIDE SEVERITY RATING SCALE - C-SSRS
2. HAVE YOU ACTUALLY HAD ANY THOUGHTS OF KILLING YOURSELF?: NO
1. IN THE PAST MONTH, HAVE YOU WISHED YOU WERE DEAD OR WISHED YOU COULD GO TO SLEEP AND NOT WAKE UP?: NO
6. HAVE YOU EVER DONE ANYTHING, STARTED TO DO ANYTHING, OR PREPARED TO DO ANYTHING TO END YOUR LIFE?: NO

## 2025-08-21 ASSESSMENT — ENCOUNTER SYMPTOMS
OCCASIONAL FEELINGS OF UNSTEADINESS: 0
LOSS OF SENSATION IN FEET: 0
DEPRESSION: 0

## 2025-08-21 ASSESSMENT — PATIENT HEALTH QUESTIONNAIRE - PHQ9
2. FEELING DOWN, DEPRESSED OR HOPELESS: SEVERAL DAYS
1. LITTLE INTEREST OR PLEASURE IN DOING THINGS: SEVERAL DAYS
SUM OF ALL RESPONSES TO PHQ9 QUESTIONS 1 AND 2: 2
10. IF YOU CHECKED OFF ANY PROBLEMS, HOW DIFFICULT HAVE THESE PROBLEMS MADE IT FOR YOU TO DO YOUR WORK, TAKE CARE OF THINGS AT HOME, OR GET ALONG WITH OTHER PEOPLE: SOMEWHAT DIFFICULT

## 2025-08-21 ASSESSMENT — PAIN SCALES - GENERAL: PAINLEVEL_OUTOF10: 6

## 2025-08-22 ENCOUNTER — INFUSION (OUTPATIENT)
Dept: INFUSION THERAPY | Facility: HOSPITAL | Age: 45
End: 2025-08-22
Payer: MEDICARE

## 2025-08-22 VITALS
RESPIRATION RATE: 20 BRPM | WEIGHT: 238.4 LBS | BODY MASS INDEX: 33.25 KG/M2 | TEMPERATURE: 98 F | OXYGEN SATURATION: 98 % | DIASTOLIC BLOOD PRESSURE: 73 MMHG | HEART RATE: 73 BPM | SYSTOLIC BLOOD PRESSURE: 119 MMHG

## 2025-08-22 DIAGNOSIS — M35.01 SJOGREN'S SYNDROME WITH KERATOCONJUNCTIVITIS SICCA: ICD-10-CM

## 2025-08-22 DIAGNOSIS — G62.9 SMALL FIBER NEUROPATHY: ICD-10-CM

## 2025-08-22 PROCEDURE — 2500000004 HC RX 250 GENERAL PHARMACY W/ HCPCS (ALT 636 FOR OP/ED): Performed by: PSYCHIATRY & NEUROLOGY

## 2025-08-22 PROCEDURE — 2500000001 HC RX 250 WO HCPCS SELF ADMINISTERED DRUGS (ALT 637 FOR MEDICARE OP): Performed by: PSYCHIATRY & NEUROLOGY

## 2025-08-22 PROCEDURE — 96366 THER/PROPH/DIAG IV INF ADDON: CPT | Mod: INF

## 2025-08-22 PROCEDURE — 96375 TX/PRO/DX INJ NEW DRUG ADDON: CPT | Mod: INF

## 2025-08-22 PROCEDURE — 96365 THER/PROPH/DIAG IV INF INIT: CPT | Mod: INF

## 2025-08-22 RX ORDER — ACETAMINOPHEN 325 MG/1
650 TABLET ORAL ONCE
Status: COMPLETED | OUTPATIENT
Start: 2025-08-22 | End: 2025-08-22

## 2025-08-22 RX ORDER — DIPHENHYDRAMINE HCL 25 MG
25 CAPSULE ORAL ONCE
Status: COMPLETED | OUTPATIENT
Start: 2025-08-22 | End: 2025-08-22

## 2025-08-22 RX ORDER — ALBUTEROL SULFATE 0.83 MG/ML
3 SOLUTION RESPIRATORY (INHALATION) AS NEEDED
OUTPATIENT
Start: 2025-09-11

## 2025-08-22 RX ORDER — EPINEPHRINE 0.3 MG/.3ML
0.3 INJECTION SUBCUTANEOUS EVERY 5 MIN PRN
OUTPATIENT
Start: 2025-09-11

## 2025-08-22 RX ORDER — DIPHENHYDRAMINE HCL 25 MG
25 CAPSULE ORAL ONCE
OUTPATIENT
Start: 2025-09-11

## 2025-08-22 RX ORDER — DIPHENHYDRAMINE HYDROCHLORIDE 50 MG/ML
50 INJECTION, SOLUTION INTRAMUSCULAR; INTRAVENOUS AS NEEDED
OUTPATIENT
Start: 2025-09-11

## 2025-08-22 RX ORDER — FAMOTIDINE 10 MG/ML
20 INJECTION, SOLUTION INTRAVENOUS ONCE AS NEEDED
OUTPATIENT
Start: 2025-09-11

## 2025-08-22 RX ORDER — ACETAMINOPHEN 325 MG/1
650 TABLET ORAL ONCE
OUTPATIENT
Start: 2025-09-11

## 2025-08-22 RX ADMIN — ACETAMINOPHEN 650 MG: 325 TABLET ORAL at 08:48

## 2025-08-22 RX ADMIN — DIPHENHYDRAMINE HYDROCHLORIDE 25 MG: 25 CAPSULE ORAL at 08:48

## 2025-08-22 RX ADMIN — HYDROCORTISONE SODIUM SUCCINATE 100 MG: 100 INJECTION, POWDER, FOR SOLUTION INTRAMUSCULAR; INTRAVENOUS at 08:48

## 2025-08-22 RX ADMIN — IMMUNE GLOBULIN INFUSION (HUMAN) 45 G: 100 INJECTION, SOLUTION INTRAVENOUS; SUBCUTANEOUS at 09:55

## 2025-08-22 ASSESSMENT — PATIENT HEALTH QUESTIONNAIRE - PHQ9
2. FEELING DOWN, DEPRESSED OR HOPELESS: NOT AT ALL
1. LITTLE INTEREST OR PLEASURE IN DOING THINGS: NOT AT ALL
10. IF YOU CHECKED OFF ANY PROBLEMS, HOW DIFFICULT HAVE THESE PROBLEMS MADE IT FOR YOU TO DO YOUR WORK, TAKE CARE OF THINGS AT HOME, OR GET ALONG WITH OTHER PEOPLE: NOT DIFFICULT AT ALL
SUM OF ALL RESPONSES TO PHQ9 QUESTIONS 1 AND 2: 0

## 2025-08-22 ASSESSMENT — ENCOUNTER SYMPTOMS
LOSS OF SENSATION IN FEET: 0
OCCASIONAL FEELINGS OF UNSTEADINESS: 0
DEPRESSION: 0

## 2025-08-22 ASSESSMENT — PAIN SCALES - GENERAL: PAINLEVEL_OUTOF10: 6

## 2025-08-28 ENCOUNTER — OFFICE VISIT (OUTPATIENT)
Dept: RHEUMATOLOGY | Facility: CLINIC | Age: 45
End: 2025-08-28
Payer: MEDICARE

## 2025-08-28 ENCOUNTER — APPOINTMENT (OUTPATIENT)
Dept: ALLERGY | Facility: CLINIC | Age: 45
End: 2025-08-28
Payer: MEDICARE

## 2025-08-28 VITALS
SYSTOLIC BLOOD PRESSURE: 158 MMHG | BODY MASS INDEX: 33.6 KG/M2 | WEIGHT: 240 LBS | DIASTOLIC BLOOD PRESSURE: 92 MMHG | HEART RATE: 72 BPM | OXYGEN SATURATION: 100 % | HEIGHT: 71 IN

## 2025-08-28 DIAGNOSIS — Z79.891 ENCOUNTER FOR LONG-TERM OPIATE ANALGESIC USE: ICD-10-CM

## 2025-08-28 DIAGNOSIS — M79.10 MYALGIA: ICD-10-CM

## 2025-08-28 DIAGNOSIS — M79.2 NEUROPATHIC PAIN: ICD-10-CM

## 2025-08-28 DIAGNOSIS — M35.06 SJOGREN'S SYNDROME WITH PERIPHERAL NERVOUS SYSTEM INVOLVEMENT: ICD-10-CM

## 2025-08-28 DIAGNOSIS — J30.89 ALLERGIC RHINITIS DUE TO OTHER ALLERGIC TRIGGER, UNSPECIFIED SEASONALITY: ICD-10-CM

## 2025-08-28 DIAGNOSIS — M25.511 RIGHT SHOULDER PAIN, UNSPECIFIED CHRONICITY: Primary | ICD-10-CM

## 2025-08-28 PROCEDURE — 99214 OFFICE O/P EST MOD 30 MIN: CPT | Performed by: INTERNAL MEDICINE

## 2025-08-28 PROCEDURE — 2500000004 HC RX 250 GENERAL PHARMACY W/ HCPCS (ALT 636 FOR OP/ED): Performed by: INTERNAL MEDICINE

## 2025-08-28 PROCEDURE — 95117 IMMUNOTHERAPY INJECTIONS: CPT | Performed by: ALLERGY & IMMUNOLOGY

## 2025-08-28 PROCEDURE — 3080F DIAST BP >= 90 MM HG: CPT | Performed by: INTERNAL MEDICINE

## 2025-08-28 PROCEDURE — 3008F BODY MASS INDEX DOCD: CPT | Performed by: INTERNAL MEDICINE

## 2025-08-28 PROCEDURE — 3077F SYST BP >= 140 MM HG: CPT | Performed by: INTERNAL MEDICINE

## 2025-08-28 RX ORDER — TRIAMCINOLONE ACETONIDE 40 MG/ML
40 INJECTION, SUSPENSION INTRA-ARTICULAR; INTRAMUSCULAR
Status: COMPLETED | OUTPATIENT
Start: 2025-08-28 | End: 2025-08-28

## 2025-08-28 RX ADMIN — TRIAMCINOLONE ACETONIDE 40 MG: 40 INJECTION, SUSPENSION INTRA-ARTICULAR; INTRAMUSCULAR at 09:43

## 2025-08-28 ASSESSMENT — ENCOUNTER SYMPTOMS
DIARRHEA: 1
CONSTIPATION: 1
FATIGUE: 1
NECK STIFFNESS: 1
ARTHRALGIAS: 1
MYALGIAS: 1
BACK PAIN: 1
NECK PAIN: 1
JOINT SWELLING: 1

## 2025-08-28 ASSESSMENT — PAIN SCALES - GENERAL: PAINLEVEL_OUTOF10: 7

## 2025-09-22 ENCOUNTER — APPOINTMENT (OUTPATIENT)
Dept: PRIMARY CARE | Facility: CLINIC | Age: 45
End: 2025-09-22
Payer: MEDICARE

## 2025-09-25 ENCOUNTER — APPOINTMENT (OUTPATIENT)
Dept: ALLERGY | Facility: CLINIC | Age: 45
End: 2025-09-25
Payer: MEDICARE

## 2025-10-23 ENCOUNTER — APPOINTMENT (OUTPATIENT)
Dept: ALLERGY | Facility: CLINIC | Age: 45
End: 2025-10-23
Payer: MEDICARE

## 2025-11-20 ENCOUNTER — APPOINTMENT (OUTPATIENT)
Dept: ALLERGY | Facility: CLINIC | Age: 45
End: 2025-11-20
Payer: MEDICARE

## (undated) DEVICE — SUTURE, SILK, 2-0, 18 IN, BLACK

## (undated) DEVICE — MANIFOLD, 4 PORT NEPTUNE STANDARD

## (undated) DEVICE — DRAPE, SHEET, FAN FOLDED, HALF, 44 X 58 IN, DISPOSABLE, LF, STERILE

## (undated) DEVICE — DRAPE, SHEET, THYROID, W/ARMBOARD COVER, 100 X 121 IN, DISPOSABLE, LF, STERILE

## (undated) DEVICE — TUBING, SMOKE EVAC, 3/8 X 10 FT

## (undated) DEVICE — ADHESIVE, SKIN, DERMABOND ADVANCED, 15CM, PEN-STYLE

## (undated) DEVICE — Device

## (undated) DEVICE — PAD, GROUNDING, ELECTROSURGICAL, W/9 FT CABLE, POLYHESIVE II, ADULT, LF

## (undated) DEVICE — BAG, PLASTIC, 10 X 17 IN

## (undated) DEVICE — BANDAGE, GAUZE, CONFORMING, KERLIX, 6 PLY, 4.5 IN X 4.1 YD

## (undated) DEVICE — DRESSING, NON-ADHERENT, TELFA, OUCHLESS, 3 X 8 IN, STERILE

## (undated) DEVICE — SUTURE, MONOCRYL, 4-0, 18 IN, PS2, UNDYED

## (undated) DEVICE — CONTAINER, SPECIMEN, 120 ML, STERILE

## (undated) DEVICE — APPLICATOR, CHLORAPREP, W/ORANGE TINT, 26ML

## (undated) DEVICE — SUTURE, VICRYL, 3-0,18 IN, SH, UNDYED

## (undated) DEVICE — COVER, CART, 45 X 27 X 48 IN, CLEAR

## (undated) DEVICE — DRAPE, SHEET, EXTREMITY, W/ARM BOARD COVERS, 87 X 106 X 128 IN, DISPOSABLE, LF, STERILE